# Patient Record
Sex: MALE | Race: WHITE | Employment: OTHER | URBAN - METROPOLITAN AREA
[De-identification: names, ages, dates, MRNs, and addresses within clinical notes are randomized per-mention and may not be internally consistent; named-entity substitution may affect disease eponyms.]

---

## 2017-01-23 ENCOUNTER — TELEPHONE (OUTPATIENT)
Dept: SURGERY | Facility: CLINIC | Age: 67
End: 2017-01-23

## 2017-01-23 NOTE — TELEPHONE ENCOUNTER
After appointment was scheduled family asked for PT order to be updated. Will have provider review to see if this is appropriate. Patient has appointment scheduled with ANTON Napoles on 1/27/17. Patient was last seen in the office on 11/22/16.

## 2017-01-23 NOTE — TELEPHONE ENCOUNTER
Spoke with patient's wife Lalitha Regalado who stated patient had cervical surgery back in 6/2016. Wife states patient is having difficulty walking. States he walks about 2 blocks and is totally exhausted.  Patient also has a hx of prostate CA and is having his PSA l

## 2017-01-27 ENCOUNTER — TELEPHONE (OUTPATIENT)
Dept: SURGERY | Facility: CLINIC | Age: 67
End: 2017-01-27

## 2017-01-27 NOTE — TELEPHONE ENCOUNTER
No physical therapy until patient is reevaluated. He was scheduled to be seen today January 27 but was unable to make it.

## 2017-01-30 NOTE — TELEPHONE ENCOUNTER
LMTCB. Need to relay message below. (Patient does not have any follow up appointments scheduled at this time.  Patient was a no show for appointment on 1/27/17.)

## 2017-01-31 NOTE — TELEPHONE ENCOUNTER
Attempted to reach patient on both cell and home numbers listed. LMTCB. See Shelly Hernandez's message below.

## 2017-02-06 NOTE — TELEPHONE ENCOUNTER
Attempted to contact patient several times without success  [employer] message sent to patient.

## 2017-02-09 ENCOUNTER — TELEPHONE (OUTPATIENT)
Dept: SURGERY | Facility: CLINIC | Age: 67
End: 2017-02-09

## 2017-02-09 ENCOUNTER — OFFICE VISIT (OUTPATIENT)
Dept: SURGERY | Facility: CLINIC | Age: 67
End: 2017-02-09

## 2017-02-09 VITALS
HEART RATE: 92 BPM | WEIGHT: 240 LBS | HEIGHT: 72 IN | RESPIRATION RATE: 18 BRPM | DIASTOLIC BLOOD PRESSURE: 84 MMHG | BODY MASS INDEX: 32.51 KG/M2 | SYSTOLIC BLOOD PRESSURE: 150 MMHG

## 2017-02-09 DIAGNOSIS — R90.89 ABNORMAL FINDING ON MRI OF BRAIN: Primary | ICD-10-CM

## 2017-02-09 DIAGNOSIS — G91.9 HYDROCEPHALUS (HCC): ICD-10-CM

## 2017-02-09 PROCEDURE — 99213 OFFICE O/P EST LOW 20 MIN: CPT | Performed by: NEUROLOGICAL SURGERY

## 2017-02-09 NOTE — PROGRESS NOTES
Here for routine follow up, neck pain from previous fall has resolved. Balance has improved but per wife he is still having walking issues.

## 2017-02-09 NOTE — PATIENT INSTRUCTIONS
Refill policies:    • Allow 2 business days for refills; controlled substances may take longer.   • Contact your pharmacy at least 5 days prior to running out of medication and have them send an electronic request or submit request through the “request re your physician has recommended that you have a procedure or additional testing performed. [facility]) will contact your insurance carrier to obtain pre-certification or prior authorization.     Unfortunately, CRISTA has seen an increas

## 2017-02-09 NOTE — TELEPHONE ENCOUNTER
Spoke with Claudia Alejandre who stated when they decide on the lumbar drain, PT would be after the lumbar drain. Patient's wife had a few other questions that were answered to wife's satisfaction. Wife was thankful.

## 2017-02-10 ENCOUNTER — TELEPHONE (OUTPATIENT)
Dept: SURGERY | Facility: CLINIC | Age: 67
End: 2017-02-10

## 2017-02-14 ENCOUNTER — OFFICE VISIT (OUTPATIENT)
Dept: NEUROLOGY | Facility: CLINIC | Age: 67
End: 2017-02-14

## 2017-02-14 VITALS
RESPIRATION RATE: 16 BRPM | SYSTOLIC BLOOD PRESSURE: 140 MMHG | HEART RATE: 90 BPM | BODY MASS INDEX: 32.91 KG/M2 | DIASTOLIC BLOOD PRESSURE: 88 MMHG | WEIGHT: 243 LBS | HEIGHT: 72 IN

## 2017-02-14 DIAGNOSIS — I63.9 BASAL GANGLIA INFARCTION (HCC): ICD-10-CM

## 2017-02-14 DIAGNOSIS — R26.9 GAIT ABNORMALITY: ICD-10-CM

## 2017-02-14 DIAGNOSIS — G91.9 HYDROCEPHALUS (HCC): ICD-10-CM

## 2017-02-14 DIAGNOSIS — R53.1 LEFT-SIDED WEAKNESS: Primary | ICD-10-CM

## 2017-02-14 PROCEDURE — 99204 OFFICE O/P NEW MOD 45 MIN: CPT | Performed by: OTHER

## 2017-02-14 NOTE — PATIENT INSTRUCTIONS
Refill policies:    • Allow 2 business days for refills; controlled substances may take longer.   • Contact your pharmacy at least 5 days prior to running out of medication and have them send an electronic request or submit request through the “request re your physician has recommended that you have a procedure or additional testing performed. [facility]) will contact your insurance carrier to obtain pre-certification or prior authorization.     Unfortunately, CRISTA has seen an increas

## 2017-02-16 ENCOUNTER — TELEPHONE (OUTPATIENT)
Dept: NEUROLOGY | Facility: CLINIC | Age: 67
End: 2017-02-16

## 2017-02-16 DIAGNOSIS — G91.9 HYDROCEPHALUS (HCC): ICD-10-CM

## 2017-02-16 DIAGNOSIS — I63.9 BASAL GANGLIA INFARCTION (HCC): ICD-10-CM

## 2017-02-16 DIAGNOSIS — Z91.041 CONTRAST MEDIA ALLERGY: Primary | ICD-10-CM

## 2017-02-16 DIAGNOSIS — R26.9 GAIT ABNORMALITY: ICD-10-CM

## 2017-02-16 DIAGNOSIS — R53.1 LEFT-SIDED WEAKNESS: ICD-10-CM

## 2017-02-16 NOTE — TELEPHONE ENCOUNTER
Spoke with Celina @ [facility]. [facility] MRA Brain and Neck has contrast and patient is allergic to contrast dye. Patient will need Premedication ordered. Premedication pending Dr Farrukh Kaplan approval.     Left message for patient to call back.  Noah Gutierres

## 2017-02-16 NOTE — PROGRESS NOTES
HPI:    Patient ID: Neno Benz is a 79year old male. Referring provider: Dr Yohan Hernandez  PCP: Dr Madhuri Villavicencio    HPI    Mr Dayo Gambino is a 79year old male who presented for evaluation of possible stroke and gait abnormality.  He has history of cervical spinal stenosis s HISTORY:  Past Medical History   Diagnosis Date   • Obstructive sleep apnea (adult) (pediatric) 6/29/2012   • COURT on CPAP 10/19/2012     6-20 [employer]   • Lung mass 10/19/2012   • Brain mass 10/19/2012   • Tobacco dependence 10/19/2012   • Obesity 10/19/2 beer occasionally       Review of Systems   Constitutional: Positive for fatigue. HENT: Negative. Eyes: Negative. Respiratory: Negative. Cardiovascular: Negative. Gastrointestinal: Negative. Endocrine: Negative. Genitourinary: Negative. intact comprehension, repetition and naming. Normal attention span. Delayed recall 3/5  Cranial nerves:   II, III, IV, VI :Pupils round, equal and reactive to light  and accommodation bilaterally. Extraocular muscle intact. Visual fields intact.    V: ganglia. ASSESSMENT/PLAN:   Left-sided weakness  (primary encounter diagnosis)  Basal ganglia infarction (hcc)  Gait abnormality  Hydrocephalus      1. Left face and arm weakness, new onset.  Obtain MRI brain and MRA head and neck to rule out new

## 2017-02-16 NOTE — IMAGING NOTE
I initially spoke with pt about contrast dye allergies. Pt w/know allergies to both CT and MRI contrast. States he had hives last time it was used. He stated he wanted to reschedule 2/17 apt until he gets pretx.   I reached out to [employer] office and Ma

## 2017-02-17 ENCOUNTER — PRIOR ORIGINAL RECORDS (OUTPATIENT)
Dept: OTHER | Age: 67
End: 2017-02-17

## 2017-02-17 ENCOUNTER — HOSPITAL ENCOUNTER (OUTPATIENT)
Dept: MRI IMAGING | Facility: HOSPITAL | Age: 67
Discharge: HOME | End: 2017-02-17
Attending: Other
Payer: MEDICARE

## 2017-02-20 RX ORDER — PREDNISONE 10 MG/1
TABLET ORAL
Qty: 15 TABLET | Refills: 0 | Status: SHIPPED | OUTPATIENT
Start: 2017-02-20 | End: 2017-02-23 | Stop reason: ALTCHOICE

## 2017-02-20 NOTE — TELEPHONE ENCOUNTER
Spoke with pts wife and reviewed dosing schedule, and confirmed that Rxs will be electronically sent when Dr. Corey Plaza signs them. Also instructed her to contact PCP for Vit D Rx. She verbalized understanding and had no further questions.

## 2017-02-21 ENCOUNTER — HOSPITAL ENCOUNTER (OUTPATIENT)
Dept: MRI IMAGING | Age: 67
Discharge: HOME | End: 2017-02-21
Attending: Other
Payer: MEDICARE

## 2017-02-21 DIAGNOSIS — G91.9 HYDROCEPHALUS (HCC): ICD-10-CM

## 2017-02-21 DIAGNOSIS — R26.9 GAIT ABNORMALITY: ICD-10-CM

## 2017-02-21 DIAGNOSIS — I63.9 BASAL GANGLIA INFARCTION (HCC): ICD-10-CM

## 2017-02-21 DIAGNOSIS — R53.1 LEFT-SIDED WEAKNESS: ICD-10-CM

## 2017-02-21 PROBLEM — Z91.041 CONTRAST MEDIA ALLERGY: Status: ACTIVE | Noted: 2017-02-21

## 2017-02-21 PROCEDURE — 70553 MRI BRAIN STEM W/O & W/DYE: CPT

## 2017-02-21 PROCEDURE — 70549 MR ANGIOGRAPH NECK W/O&W/DYE: CPT

## 2017-02-21 PROCEDURE — 70546 MR ANGIOGRAPH HEAD W/O&W/DYE: CPT

## 2017-02-21 PROCEDURE — A9575 INJ GADOTERATE MEGLUMI 0.1ML: HCPCS | Performed by: OTHER

## 2017-02-21 NOTE — TELEPHONE ENCOUNTER
Spoke with [facility]. Alan Mace states MRI/MRA orders are without contrast and patient has had premed prep. She requests to change MRI/MRA orders to with and without contrast.     Informed Alan Mace below conversation with Gayle Hand.      Noted patient reed

## 2017-02-22 ENCOUNTER — TELEPHONE (OUTPATIENT)
Dept: NEUROLOGY | Facility: CLINIC | Age: 67
End: 2017-02-22

## 2017-02-22 NOTE — TELEPHONE ENCOUNTER
Spoke to the patient and his wife regarding MRI brain and MRA head and neck results. States he saw Dr Juliocesar Rodriguez from Cardiology and he recommended US arterial of the legs given decrease leg pulses. He is also going to have US carotids.      MRA neck sh

## 2017-02-22 NOTE — TELEPHONE ENCOUNTER
Patient called front office 308 wanting to put a rush on getting MRI results. Informed front office staff that I was not too familiar with 's schedule, but that I would relay the message to 's nurses.

## 2017-02-22 NOTE — TELEPHONE ENCOUNTER
Patient calling to check on MRI/MRA results. MRI/MRA results in EPIC. Will have provider review results and advise.

## 2017-02-23 ENCOUNTER — HOSPITAL ENCOUNTER (OUTPATIENT)
Dept: CARDIOLOGY CLINIC | Facility: HOSPITAL | Age: 67
Discharge: HOME | End: 2017-02-23
Attending: INTERNAL MEDICINE

## 2017-02-23 ENCOUNTER — OFFICE VISIT (OUTPATIENT)
Dept: SURGERY | Facility: CLINIC | Age: 67
End: 2017-02-23

## 2017-02-23 VITALS
BODY MASS INDEX: 32.86 KG/M2 | RESPIRATION RATE: 18 BRPM | WEIGHT: 240 LBS | SYSTOLIC BLOOD PRESSURE: 122 MMHG | HEART RATE: 90 BPM | DIASTOLIC BLOOD PRESSURE: 80 MMHG | HEIGHT: 71.5 IN

## 2017-02-23 DIAGNOSIS — R26.9 GAIT DISTURBANCE: Primary | ICD-10-CM

## 2017-02-23 DIAGNOSIS — I65.23 BILATERAL CAROTID ARTERY STENOSIS: ICD-10-CM

## 2017-02-23 DIAGNOSIS — R42 DIZZINESS: ICD-10-CM

## 2017-02-23 PROCEDURE — 99213 OFFICE O/P EST LOW 20 MIN: CPT | Performed by: NEUROLOGICAL SURGERY

## 2017-02-23 NOTE — PATIENT INSTRUCTIONS
Refill policies:    • Allow 2 business days for refills; controlled substances may take longer.   • Contact your pharmacy at least 5 days prior to running out of medication and have them send an electronic request or submit request through the “request re your physician has recommended that you have a procedure or additional testing performed. [facility]) will contact your insurance carrier to obtain pre-certification or prior authorization.     Unfortunately, CRISTA has seen an increas

## 2017-02-23 NOTE — PROGRESS NOTES
[facility]    Keniasean Kali  2/23/2017    Diagnosis: Gait disturbance and early fatigue on walking. Follow-up on left caudate lacunar infarction. Stable intraventricular lesion.     Interval History: He is here

## 2017-02-28 ENCOUNTER — MYAURORA ACCOUNT LINK (OUTPATIENT)
Dept: OTHER | Age: 67
End: 2017-02-28

## 2017-02-28 ENCOUNTER — HOSPITAL ENCOUNTER (OUTPATIENT)
Dept: CV DIAGNOSTICS | Facility: HOSPITAL | Age: 67
Discharge: HOME | End: 2017-02-28
Attending: INTERNAL MEDICINE

## 2017-02-28 ENCOUNTER — HOSPITAL ENCOUNTER (OUTPATIENT)
Dept: CARDIOLOGY CLINIC | Facility: HOSPITAL | Age: 67
Discharge: HOME | End: 2017-02-28
Attending: INTERNAL MEDICINE

## 2017-02-28 DIAGNOSIS — I70.0 ATHEROSCLEROSIS OF AORTA (HCC): ICD-10-CM

## 2017-02-28 DIAGNOSIS — R42 DIZZINESS: ICD-10-CM

## 2017-02-28 DIAGNOSIS — M79.606 PAIN OF LOWER EXTREMITY, UNSPECIFIED LATERALITY: ICD-10-CM

## 2017-03-08 ENCOUNTER — PRIOR ORIGINAL RECORDS (OUTPATIENT)
Dept: OTHER | Age: 67
End: 2017-03-08

## 2017-03-08 ENCOUNTER — TELEPHONE (OUTPATIENT)
Dept: SURGERY | Facility: CLINIC | Age: 67
End: 2017-03-08

## 2017-03-08 ENCOUNTER — TELEPHONE (OUTPATIENT)
Dept: NEUROLOGY | Facility: CLINIC | Age: 67
End: 2017-03-08

## 2017-03-08 NOTE — TELEPHONE ENCOUNTER
Madhavi Mcmanus, the patient's wife, contacted the office regarding the patients \"left side of the body went limp\" last night. He fell asleep, went up and down the stairs, felt fatigued, sat down, and upon rising from the chair he had left sided weakness.  No slur

## 2017-03-08 NOTE — TELEPHONE ENCOUNTER
Spoke with patient's wife Alex Gonzalez and reviewed plan per last office visit. Alex Jairojuan manuel understood and stated the cardiac workup came back and they were told he has blockages in his legs and he will be seeing a vascular surgeon on Monday 3/13/17.  Alex Gonzalez was trans

## 2017-03-09 ENCOUNTER — HOSPITAL ENCOUNTER (OUTPATIENT)
Dept: CV DIAGNOSTICS | Facility: HOSPITAL | Age: 67
Discharge: HOME | End: 2017-03-09
Attending: INTERNAL MEDICINE
Payer: MEDICARE

## 2017-03-09 DIAGNOSIS — R94.31 ABNORMAL ELECTROCARDIOGRAM: ICD-10-CM

## 2017-03-09 DIAGNOSIS — R42 DIZZINESS AND GIDDINESS: ICD-10-CM

## 2017-03-09 PROCEDURE — 93225 XTRNL ECG REC<48 HRS REC: CPT

## 2017-03-09 PROCEDURE — 93226 XTRNL ECG REC<48 HR SCAN A/R: CPT

## 2017-03-09 PROCEDURE — 93227 XTRNL ECG REC<48 HR R&I: CPT | Performed by: INTERNAL MEDICINE

## 2017-03-13 ENCOUNTER — PRIOR ORIGINAL RECORDS (OUTPATIENT)
Dept: OTHER | Age: 67
End: 2017-03-13

## 2017-03-14 ENCOUNTER — OFFICE VISIT (OUTPATIENT)
Dept: NEUROLOGY | Facility: CLINIC | Age: 67
End: 2017-03-14

## 2017-03-14 ENCOUNTER — TELEPHONE (OUTPATIENT)
Dept: NEUROLOGY | Facility: CLINIC | Age: 67
End: 2017-03-14

## 2017-03-14 VITALS
BODY MASS INDEX: 33 KG/M2 | RESPIRATION RATE: 18 BRPM | DIASTOLIC BLOOD PRESSURE: 80 MMHG | WEIGHT: 242 LBS | SYSTOLIC BLOOD PRESSURE: 120 MMHG | HEART RATE: 88 BPM

## 2017-03-14 DIAGNOSIS — M79.605 PAIN IN BOTH LOWER EXTREMITIES: ICD-10-CM

## 2017-03-14 DIAGNOSIS — M79.604 PAIN IN BOTH LOWER EXTREMITIES: ICD-10-CM

## 2017-03-14 DIAGNOSIS — R26.9 GAIT DISTURBANCE: Primary | ICD-10-CM

## 2017-03-14 DIAGNOSIS — M48.061 LUMBAR SPINAL STENOSIS: ICD-10-CM

## 2017-03-14 PROCEDURE — 99213 OFFICE O/P EST LOW 20 MIN: CPT | Performed by: OTHER

## 2017-03-14 RX ORDER — MULTIVITAMIN
1 TABLET ORAL DAILY
COMMUNITY
End: 2017-05-08 | Stop reason: ALTCHOICE

## 2017-03-14 NOTE — TELEPHONE ENCOUNTER
No records received yet. Called office again. Nael Hitchcock said that note had not yet been dictated. She put a rush on dictation, and will fax report when it is complete. Dr. Luz Garcia informed.

## 2017-03-14 NOTE — TELEPHONE ENCOUNTER
Pt in office currently, and Dr. Federica Christensen is requesting a copy of the patient's last OV notes from his cardilogist, Dr. Arlin Combs. He is part of the [facility], and therefore we cannot see medical records online.     Shanon 190

## 2017-03-14 NOTE — PROGRESS NOTES
HPI:    Patient ID: Carlyle Levin is a 79year old male. Neurologic Problem  The patient's primary symptoms include weakness. Associated symptoms include back pain and fatigue. Pertinent negatives include no dizziness.          Follow up: Patient state leg weakness and gait balance. In November 2016 he had a fall and had repeat CT head and CT cervical spine which was negative for acute changes. Wife thinks his walking is getting worse again.  He is again getting tired more quickly with walking and has gen anterior cervical neck sx      N/A 6/6/2016    Comment Procedure: ANTERIOR CERVICAL FUSION BG & INST 2 LEVEL;  Surgeon: Kavin Aj MD;  Location: [address] [place] [address] OR     N/A 6/6/2016    Comment Procedure: INTRAOPERATIVE NEURO MONITORING;  Surgeon: Power Dixon Rash    Comment:Iodine Dye Injectable   PHYSICAL EXAM:   Physical Exam      Vitals reviewed  General: Minimal facial expression  Head: Normocephalic and atraumatic.    Neck: Limited due to fusion surgery  Cardiovascular: Normal rate, regular rhythm a and I believe gait issues is multifactorial ( residual from cervical myelopathy, also has lumbar spinal stenosis, PVD). MRI brain showed stable ventricles and intraventricular mass.  He has prominent leg claudication component and has significant peripheral

## 2017-03-17 ENCOUNTER — PRIOR ORIGINAL RECORDS (OUTPATIENT)
Dept: OTHER | Age: 67
End: 2017-03-17

## 2017-03-20 NOTE — IMAGING NOTE
Pt allergic to contrast. Dr. Garrison Inez office called and message left to order premeds. Pt is aware of premed protocol.

## 2017-03-21 ENCOUNTER — PRIOR ORIGINAL RECORDS (OUTPATIENT)
Dept: OTHER | Age: 67
End: 2017-03-21

## 2017-03-22 ENCOUNTER — HOSPITAL ENCOUNTER (OUTPATIENT)
Dept: CT IMAGING | Facility: HOSPITAL | Age: 67
Discharge: HOME | End: 2017-03-22
Attending: INTERNAL MEDICINE
Payer: MEDICARE

## 2017-03-22 DIAGNOSIS — I70.0 ATHEROSCLEROSIS OF AORTA (HCC): ICD-10-CM

## 2017-03-22 PROCEDURE — 75635 CT ANGIO ABDOMINAL ARTERIES: CPT

## 2017-03-23 ENCOUNTER — TELEPHONE (OUTPATIENT)
Dept: NEUROLOGY | Facility: CLINIC | Age: 67
End: 2017-03-23

## 2017-03-23 NOTE — TELEPHONE ENCOUNTER
Per 3/14/17 OV note:  Gait disturbance  (primary encounter diagnosis)  Pain in both lower extremities  Lumbar spinal stenosis      1. Episode of transient left face and arm weakness. Repeat MRI brain showed no new infarcts.  He does have a 50% stenosis in t

## 2017-03-24 NOTE — TELEPHONE ENCOUNTER
Wife Carson Comas calling (OK per HIPAA) to see if test results of CT scan are back. She is anxious to determine next steps for her , as he has been falling lately. Informed her that CT has been resulted, but has not been reviewed by Dr. Tc Okeefe.   Expl

## 2017-03-27 ENCOUNTER — PRIOR ORIGINAL RECORDS (OUTPATIENT)
Dept: OTHER | Age: 67
End: 2017-03-27

## 2017-03-27 NOTE — TELEPHONE ENCOUNTER
I could not speak to Dr Meli Wiley from Cardiology ( not sure if he is on vacation but will contact his office again). Cardiology will contact him with CTA results ( it was recommended by them).

## 2017-03-27 NOTE — TELEPHONE ENCOUNTER
Wife Francisco Payne calling stating she just missed a call from our office. Not noted in Epic. No one listed on HIPAA. Francisco Payne, wife calling back. Informed her I was unable to speak with her, placed call on speaker phone so that patient was there as well.  State

## 2017-03-28 ENCOUNTER — TELEPHONE (OUTPATIENT)
Dept: SURGERY | Facility: CLINIC | Age: 67
End: 2017-03-28

## 2017-03-28 RX ORDER — PREDNISONE 10 MG/1
TABLET ORAL
Qty: 15 TABLET | Refills: 0 | Status: SHIPPED | OUTPATIENT
Start: 2017-03-28 | End: 2017-03-30 | Stop reason: ALTCHOICE

## 2017-03-28 NOTE — TELEPHONE ENCOUNTER
Wife calling to inform that he had a reaction to imaging done recently, she is concerned due to him needing to have an MRI for his next appt.     His reaction consisted of swelling and redness to his face and chest.  Wife states he did not have a reaction i

## 2017-03-28 NOTE — TELEPHONE ENCOUNTER
Spoke with patient's wife. I could not got the chance to speak to Dr Karina Hudson as he is on vacation. Patient wife states he continue to have gait difficulty and fell last week again. No clear leg weakness but fatigue/tiredness with walking.  He has PVD an

## 2017-03-30 ENCOUNTER — OFFICE VISIT (OUTPATIENT)
Dept: SURGERY | Facility: CLINIC | Age: 67
End: 2017-03-30

## 2017-03-30 VITALS
DIASTOLIC BLOOD PRESSURE: 62 MMHG | HEIGHT: 72 IN | WEIGHT: 240 LBS | BODY MASS INDEX: 32.51 KG/M2 | SYSTOLIC BLOOD PRESSURE: 110 MMHG | RESPIRATION RATE: 16 BRPM | HEART RATE: 104 BPM

## 2017-03-30 DIAGNOSIS — R90.89 ABNORMAL FINDING ON MRI OF BRAIN: Primary | ICD-10-CM

## 2017-03-30 PROCEDURE — 99212 OFFICE O/P EST SF 10 MIN: CPT | Performed by: NEUROLOGICAL SURGERY

## 2017-03-30 NOTE — PATIENT INSTRUCTIONS
Refill policies:    • Allow 2 business days for refills; controlled substances may take longer.   • Contact your pharmacy at least 5 days prior to running out of medication and have them send an electronic request or submit request through the “request re insurance carrier to obtain pre-certification or prior authorization. Unfortunately, CRISTA has seen an increase in denial of payment even though the procedure/test has been pre-certified.   You are strongly encouraged to contact your insurance carrier to v

## 2017-03-31 ENCOUNTER — PRIOR ORIGINAL RECORDS (OUTPATIENT)
Dept: OTHER | Age: 67
End: 2017-03-31

## 2017-04-03 ENCOUNTER — PRIOR ORIGINAL RECORDS (OUTPATIENT)
Dept: OTHER | Age: 67
End: 2017-04-03

## 2017-04-05 ENCOUNTER — HOSPITAL ENCOUNTER (OUTPATIENT)
Dept: MRI IMAGING | Age: 67
Discharge: HOME | End: 2017-04-05
Attending: NEUROLOGICAL SURGERY
Payer: MEDICARE

## 2017-04-05 ENCOUNTER — TELEPHONE (OUTPATIENT)
Dept: SURGERY | Facility: CLINIC | Age: 67
End: 2017-04-05

## 2017-04-05 ENCOUNTER — PRIOR ORIGINAL RECORDS (OUTPATIENT)
Dept: OTHER | Age: 67
End: 2017-04-05

## 2017-04-05 DIAGNOSIS — R26.9 GAIT DISTURBANCE: ICD-10-CM

## 2017-04-05 PROCEDURE — 72148 MRI LUMBAR SPINE W/O DYE: CPT

## 2017-04-06 ENCOUNTER — HOSPITAL ENCOUNTER (OUTPATIENT)
Dept: CV DIAGNOSTICS | Facility: HOSPITAL | Age: 67
Discharge: HOME | End: 2017-04-06
Attending: INTERNAL MEDICINE
Payer: MEDICARE

## 2017-04-06 DIAGNOSIS — R06.00 DYSPNEA: ICD-10-CM

## 2017-04-06 DIAGNOSIS — R93.1 ABNORMAL SCREENING CT OF HEART: ICD-10-CM

## 2017-04-06 DIAGNOSIS — R06.03 ACUTE RESPIRATORY DISTRESS: ICD-10-CM

## 2017-04-06 DIAGNOSIS — R53.83 FATIGUE: ICD-10-CM

## 2017-04-06 PROCEDURE — 93017 CV STRESS TEST TRACING ONLY: CPT

## 2017-04-06 PROCEDURE — 93018 CV STRESS TEST I&R ONLY: CPT | Performed by: INTERNAL MEDICINE

## 2017-04-06 PROCEDURE — 78452 HT MUSCLE IMAGE SPECT MULT: CPT | Performed by: INTERNAL MEDICINE

## 2017-04-06 PROCEDURE — 78452 HT MUSCLE IMAGE SPECT MULT: CPT

## 2017-04-06 NOTE — TELEPHONE ENCOUNTER
Wife states that Dr. Vanessa Pimentel, vascular cardiologist,  states that gait issues are neurosurgical not vascular issues.   They are going to see a vascular surgeon for an aneurysm that needs treatment  Wife would like Dr. Simin Engle to call the cardiologist to [facility]

## 2017-04-07 ENCOUNTER — TELEPHONE (OUTPATIENT)
Dept: SURGERY | Facility: CLINIC | Age: 67
End: 2017-04-07

## 2017-04-07 ENCOUNTER — PRIOR ORIGINAL RECORDS (OUTPATIENT)
Dept: OTHER | Age: 67
End: 2017-04-07

## 2017-04-07 NOTE — TELEPHONE ENCOUNTER
Wife states her  is not able to walk, they are at Urologist appointment right now  Urologist feels walking is vascular related, Vascular thinks it is neurosurgical.   Wife does not know what she should do with her     She is requesting to spe

## 2017-04-10 PROBLEM — R90.89 ABNORMAL FINDING ON MRI OF BRAIN: Status: ACTIVE | Noted: 2017-04-10

## 2017-04-10 PROBLEM — I73.9 CLAUDICATION OF BOTH LOWER EXTREMITIES (HCC): Status: ACTIVE | Noted: 2017-04-10

## 2017-04-10 PROBLEM — R29.2 HYPOREFLEXIA: Status: ACTIVE | Noted: 2017-04-10

## 2017-04-10 PROBLEM — I73.9 CLAUDICATION OF BOTH LOWER EXTREMITIES: Status: ACTIVE | Noted: 2017-04-10

## 2017-04-10 PROBLEM — R29.898 WEAKNESS OF BOTH LOWER EXTREMITIES: Status: ACTIVE | Noted: 2017-04-10

## 2017-04-11 ENCOUNTER — TELEPHONE (OUTPATIENT)
Dept: NEUROLOGY | Facility: CLINIC | Age: 67
End: 2017-04-11

## 2017-04-11 NOTE — TELEPHONE ENCOUNTER
----- Message from Mail Longoria MD sent at 4/10/2017  1:02 PM CDT -----  Multilevel degenerative changes unchanged from prior exam and no significant spinal stenosis.

## 2017-04-17 ENCOUNTER — PRIOR ORIGINAL RECORDS (OUTPATIENT)
Dept: OTHER | Age: 67
End: 2017-04-17

## 2017-04-17 ENCOUNTER — TELEPHONE (OUTPATIENT)
Dept: SURGERY | Facility: CLINIC | Age: 67
End: 2017-04-17

## 2017-04-17 NOTE — TELEPHONE ENCOUNTER
Wife calling to inform they went in for a second opinion and saw a neurologist.  Neurologist states he believes pt's problem is not vascular, he feels problem is neurological.  He recommends to draining spinal fluid to check NPH.  Met with Davin Crisostomo

## 2017-04-19 NOTE — TELEPHONE ENCOUNTER
Left a voicemail for the patient's wife Blake Hang call us back.     At Dr. Jaqueline Garcia last visit to 2317 he recommended MRI lumbar spine which patient did have and consider a spinal fluid drainage    He was seen in follow-up on 3/30 he was concerned for vascular

## 2017-04-26 ENCOUNTER — TELEPHONE (OUTPATIENT)
Dept: SURGERY | Facility: CLINIC | Age: 67
End: 2017-04-26

## 2017-04-26 NOTE — TELEPHONE ENCOUNTER
Discussed issue with Araceli Hinton, he is not sure what is the plan of treatment   He is recommending that patient come in to see Dr. Corine Fong for full discussion  Please contact patient and make an appt with Dr. Corine Fong, offer 5/11/17 at 9:00a, 11:30a, or 1:00p

## 2017-05-04 ENCOUNTER — TELEPHONE (OUTPATIENT)
Dept: SURGERY | Facility: CLINIC | Age: 67
End: 2017-05-04

## 2017-05-04 ENCOUNTER — OFFICE VISIT (OUTPATIENT)
Dept: SURGERY | Facility: CLINIC | Age: 67
End: 2017-05-04

## 2017-05-04 VITALS
HEART RATE: 96 BPM | HEIGHT: 72 IN | SYSTOLIC BLOOD PRESSURE: 140 MMHG | RESPIRATION RATE: 16 BRPM | BODY MASS INDEX: 32.51 KG/M2 | WEIGHT: 240 LBS | DIASTOLIC BLOOD PRESSURE: 100 MMHG

## 2017-05-04 DIAGNOSIS — M48.061 LUMBAR STENOSIS: ICD-10-CM

## 2017-05-04 DIAGNOSIS — G91.9 HYDROCEPHALUS (HCC): ICD-10-CM

## 2017-05-04 DIAGNOSIS — I73.9 PVD (PERIPHERAL VASCULAR DISEASE) WITH CLAUDICATION (HCC): ICD-10-CM

## 2017-05-04 DIAGNOSIS — Z98.1 S/P SPINAL FUSION: ICD-10-CM

## 2017-05-04 DIAGNOSIS — I63.9 BASAL GANGLIA INFARCTION (HCC): ICD-10-CM

## 2017-05-04 DIAGNOSIS — R26.9 GAIT DISTURBANCE: Primary | ICD-10-CM

## 2017-05-04 PROCEDURE — 99214 OFFICE O/P EST MOD 30 MIN: CPT | Performed by: PHYSICIAN ASSISTANT

## 2017-05-04 NOTE — PROGRESS NOTES
NEUROSURGERY  POSTOP FOLLOW UP    REASON FOR VISIT:    1. NPH  2. C5-6-7 ACDF 6/6/16  3. Gait disturbance  4. Peripheral vascular disease in the legs    HISTORY OF PRESENT ILLNESS:Silvio Campbell is a 79year old male here in follow up.   He has seen a vas after falling at home off a step stool approximately 2-2 and half weeks ago.  She was on a step stool fell backwards and struck the back of his head on a wooden window seal are wall.  He denies loss of consciousness.  He denies headache.  He has had poster Patient is able to rise from a chair without assistance of his arms. No tremors are noted. He does have some pooling of the right hand.   He has normal tone in his left upper extremity mild increased tone in his right arm mild to moderate increased tone i

## 2017-05-08 NOTE — TELEPHONE ENCOUNTER
Patient called back and was informed of the below instructions. Patient stated he has been holding his baby ASA for the past 4 days. Patient understood and was thankful for the call back. Call was transferred to  to schedule post-op appointment.

## 2017-05-08 NOTE — TELEPHONE ENCOUNTER
LMTCB. Need to review the below surgery instructions with patient. Lumbar Drain trial on 5/9/17 with Carlito Kruger. Discussed pre-op instructions with patient    · Contact Pre-Admissions department at [phone].  Bloodwork will be needed for the procedu

## 2017-05-09 ENCOUNTER — ANESTHESIA EVENT (OUTPATIENT)
Dept: SURGERY | Facility: HOSPITAL | Age: 67
DRG: 030 | End: 2017-05-09
Payer: MEDICARE

## 2017-05-09 ENCOUNTER — HOSPITAL ENCOUNTER (INPATIENT)
Facility: HOSPITAL | Age: 67
LOS: 2 days | Discharge: HOME | DRG: 030 | End: 2017-05-11
Attending: NEUROLOGICAL SURGERY | Admitting: NEUROLOGICAL SURGERY
Payer: MEDICARE

## 2017-05-09 ENCOUNTER — SURGERY (OUTPATIENT)
Age: 67
End: 2017-05-09

## 2017-05-09 ENCOUNTER — ANESTHESIA (OUTPATIENT)
Dept: SURGERY | Facility: HOSPITAL | Age: 67
DRG: 030 | End: 2017-05-09
Payer: MEDICARE

## 2017-05-09 DIAGNOSIS — I73.9 PVD (PERIPHERAL VASCULAR DISEASE) WITH CLAUDICATION (HCC): ICD-10-CM

## 2017-05-09 DIAGNOSIS — I63.9 BASAL GANGLIA INFARCTION (HCC): ICD-10-CM

## 2017-05-09 DIAGNOSIS — Z98.1 S/P SPINAL FUSION: ICD-10-CM

## 2017-05-09 DIAGNOSIS — R26.9 GAIT DISTURBANCE: ICD-10-CM

## 2017-05-09 DIAGNOSIS — M48.061 LUMBAR STENOSIS: ICD-10-CM

## 2017-05-09 DIAGNOSIS — G91.9 HYDROCEPHALUS (HCC): ICD-10-CM

## 2017-05-09 PROCEDURE — 009Y30Z DRAINAGE OF LUMBAR SPINAL CORD WITH DRAINAGE DEVICE, PERCUTANEOUS APPROACH: ICD-10-PCS | Performed by: NEUROLOGICAL SURGERY

## 2017-05-09 PROCEDURE — 99222 1ST HOSP IP/OBS MODERATE 55: CPT | Performed by: OTHER

## 2017-05-09 RX ORDER — DOCUSATE SODIUM 100 MG/1
100 CAPSULE, LIQUID FILLED ORAL 2 TIMES DAILY
Status: DISCONTINUED | OUTPATIENT
Start: 2017-05-09 | End: 2017-05-11

## 2017-05-09 RX ORDER — HYDROCODONE BITARTRATE AND ACETAMINOPHEN 5; 325 MG/1; MG/1
2 TABLET ORAL EVERY 4 HOURS PRN
Status: DISCONTINUED | OUTPATIENT
Start: 2017-05-09 | End: 2017-05-11

## 2017-05-09 RX ORDER — HYDROMORPHONE HYDROCHLORIDE 1 MG/ML
1 INJECTION, SOLUTION INTRAMUSCULAR; INTRAVENOUS; SUBCUTANEOUS EVERY 2 HOUR PRN
Status: DISCONTINUED | OUTPATIENT
Start: 2017-05-09 | End: 2017-05-11

## 2017-05-09 RX ORDER — ACETAMINOPHEN 325 MG/1
650 TABLET ORAL EVERY 6 HOURS PRN
Status: DISCONTINUED | OUTPATIENT
Start: 2017-05-09 | End: 2017-05-09

## 2017-05-09 RX ORDER — HYDROCODONE BITARTRATE AND ACETAMINOPHEN 5; 325 MG/1; MG/1
1 TABLET ORAL AS NEEDED
Status: DISCONTINUED | OUTPATIENT
Start: 2017-05-09 | End: 2017-05-09 | Stop reason: HOSPADM

## 2017-05-09 RX ORDER — ONDANSETRON 2 MG/ML
4 INJECTION INTRAMUSCULAR; INTRAVENOUS AS NEEDED
Status: DISCONTINUED | OUTPATIENT
Start: 2017-05-09 | End: 2017-05-09 | Stop reason: HOSPADM

## 2017-05-09 RX ORDER — ALFUZOSIN HYDROCHLORIDE 10 MG/1
10 TABLET, EXTENDED RELEASE ORAL DAILY
Status: DISCONTINUED | OUTPATIENT
Start: 2017-05-09 | End: 2017-05-11

## 2017-05-09 RX ORDER — HYDROCODONE BITARTRATE AND ACETAMINOPHEN 5; 325 MG/1; MG/1
1 TABLET ORAL EVERY 4 HOURS PRN
Status: DISCONTINUED | OUTPATIENT
Start: 2017-05-09 | End: 2017-05-11

## 2017-05-09 RX ORDER — LABETALOL HYDROCHLORIDE 5 MG/ML
5 INJECTION, SOLUTION INTRAVENOUS EVERY 5 MIN PRN
Status: DISCONTINUED | OUTPATIENT
Start: 2017-05-09 | End: 2017-05-09 | Stop reason: HOSPADM

## 2017-05-09 RX ORDER — BUPIVACAINE HYDROCHLORIDE AND EPINEPHRINE 5; 5 MG/ML; UG/ML
INJECTION, SOLUTION EPIDURAL; INTRACAUDAL; PERINEURAL AS NEEDED
Status: DISCONTINUED | OUTPATIENT
Start: 2017-05-09 | End: 2017-05-09 | Stop reason: HOSPADM

## 2017-05-09 RX ORDER — MEPERIDINE HYDROCHLORIDE 25 MG/ML
12.5 INJECTION INTRAMUSCULAR; INTRAVENOUS; SUBCUTANEOUS AS NEEDED
Status: DISCONTINUED | OUTPATIENT
Start: 2017-05-09 | End: 2017-05-09 | Stop reason: HOSPADM

## 2017-05-09 RX ORDER — ACETAMINOPHEN 500 MG
1000 TABLET ORAL ONCE AS NEEDED
Status: DISCONTINUED | OUTPATIENT
Start: 2017-05-09 | End: 2017-05-09 | Stop reason: HOSPADM

## 2017-05-09 RX ORDER — NALOXONE HYDROCHLORIDE 0.4 MG/ML
80 INJECTION, SOLUTION INTRAMUSCULAR; INTRAVENOUS; SUBCUTANEOUS AS NEEDED
Status: DISCONTINUED | OUTPATIENT
Start: 2017-05-09 | End: 2017-05-09 | Stop reason: HOSPADM

## 2017-05-09 RX ORDER — ACETAMINOPHEN 325 MG/1
650 TABLET ORAL EVERY 6 HOURS PRN
Status: DISCONTINUED | OUTPATIENT
Start: 2017-05-09 | End: 2017-05-11

## 2017-05-09 RX ORDER — SODIUM CHLORIDE, SODIUM LACTATE, POTASSIUM CHLORIDE, CALCIUM CHLORIDE 600; 310; 30; 20 MG/100ML; MG/100ML; MG/100ML; MG/100ML
INJECTION, SOLUTION INTRAVENOUS CONTINUOUS
Status: CANCELLED | OUTPATIENT
Start: 2017-05-09

## 2017-05-09 RX ORDER — ATORVASTATIN CALCIUM 10 MG/1
10 TABLET, FILM COATED ORAL NIGHTLY
Status: DISCONTINUED | OUTPATIENT
Start: 2017-05-09 | End: 2017-05-11

## 2017-05-09 RX ORDER — SODIUM CHLORIDE, SODIUM LACTATE, POTASSIUM CHLORIDE, CALCIUM CHLORIDE 600; 310; 30; 20 MG/100ML; MG/100ML; MG/100ML; MG/100ML
INJECTION, SOLUTION INTRAVENOUS CONTINUOUS
Status: DISCONTINUED | OUTPATIENT
Start: 2017-05-09 | End: 2017-05-11

## 2017-05-09 RX ORDER — HYDROMORPHONE HYDROCHLORIDE 1 MG/ML
0.5 INJECTION, SOLUTION INTRAMUSCULAR; INTRAVENOUS; SUBCUTANEOUS EVERY 2 HOUR PRN
Status: DISCONTINUED | OUTPATIENT
Start: 2017-05-09 | End: 2017-05-11

## 2017-05-09 RX ORDER — FAMOTIDINE 20 MG/1
20 TABLET ORAL 2 TIMES DAILY
Status: DISCONTINUED | OUTPATIENT
Start: 2017-05-09 | End: 2017-05-11

## 2017-05-09 RX ORDER — HYDROCODONE BITARTRATE AND ACETAMINOPHEN 5; 325 MG/1; MG/1
2 TABLET ORAL AS NEEDED
Status: DISCONTINUED | OUTPATIENT
Start: 2017-05-09 | End: 2017-05-09 | Stop reason: HOSPADM

## 2017-05-09 RX ORDER — FAMOTIDINE 10 MG/ML
20 INJECTION, SOLUTION INTRAVENOUS 2 TIMES DAILY
Status: DISCONTINUED | OUTPATIENT
Start: 2017-05-09 | End: 2017-05-11

## 2017-05-09 RX ORDER — HYDROMORPHONE HYDROCHLORIDE 1 MG/ML
0.4 INJECTION, SOLUTION INTRAMUSCULAR; INTRAVENOUS; SUBCUTANEOUS EVERY 5 MIN PRN
Status: DISCONTINUED | OUTPATIENT
Start: 2017-05-09 | End: 2017-05-09 | Stop reason: HOSPADM

## 2017-05-09 RX ORDER — ONDANSETRON 2 MG/ML
4 INJECTION INTRAMUSCULAR; INTRAVENOUS EVERY 6 HOURS PRN
Status: DISCONTINUED | OUTPATIENT
Start: 2017-05-09 | End: 2017-05-11

## 2017-05-09 RX ORDER — DIPHENHYDRAMINE HYDROCHLORIDE 50 MG/ML
12.5 INJECTION INTRAMUSCULAR; INTRAVENOUS AS NEEDED
Status: DISCONTINUED | OUTPATIENT
Start: 2017-05-09 | End: 2017-05-09 | Stop reason: HOSPADM

## 2017-05-09 NOTE — H&P
[id number] [facility]   History and Physical    Mario Hernandez Patient Status:  Surgery Admit    1950 MRN [id number]   Location [address] [place] [address] Attending Ericka Dobbins MD   Hosp Day # 0 PCP Petra Ballard is old.  He has had cervical x-rays but no other imaging.  He denies drowsiness or somnolence.  Swallowing is improved since his surgery.        PAST MEDICAL HISTORY:  Past Medical History   Diagnosis Date   • Obstructive sleep apnea (adult) (pediatric) 6/2 Rash    Comment:Iodine Dye Injectable    MEDICATIONS:    Prescriptions prior to admission:  Cholecalciferol (VITAMIN D) 1000 units Oral Tab Take by mouth daily. Disp:  Rfl:  5/5/2017   Pravastatin Sodium 40 MG Oral Tab Take 40 mg by mouth nightly.  Disp:  R NPH  2. Gait disturbance  3.  PVD      Plan:  Admit for lumbar drain trial  Further orders to follow post-op        CHANDLER Esteban [address]  Pager [phone]  5/9/2017, 7:53 AM

## 2017-05-09 NOTE — ANESTHESIA PREPROCEDURE EVALUATION
PRE-OP EVALUATION    Patient Name: Kumar Canas    Pre-op Diagnosis: Gait disturbance [R26.9]  Hydrocephalus [G91.9]  Basal ganglia infarction ([facility] [I63.9]  S/P spinal fusion [Z98.1]  PVD (peripheral vascular disease) with claudication ([facility] [I73.9]  Demetrio Edwards cardiac symptoms.    4.  No previous study for comparison. d Roddy Dow MD  t   [id number] 4/8/2017 10:28 AM              (+) obesity     (+) hyperlipidemia                                  Endo/Other    Negative endo/other ROS. 05/09/2017         Airway      Mallampati: II  Mouth opening: >3 FB  TM distance: > 6 cm  Neck ROM: full Cardiovascular    Cardiovascular exam normal.  Rhythm: regular  Rate: normal     Dental      Dental appliance(s): partials       Pulmonary    Pulmonary

## 2017-05-09 NOTE — CONSULTS
[facility]  Neuro Critical Care Consult Note    Julia Ramos Patient Status:  Inpatient    1950 MRN [id number]   [facility] 6NE-A Attending Yamilet Alvarado MD   Hosp Day # 0 PCP Win Cordero MD     Date of Admission: 2017 INTRAOCULAR LENS IMPLANT [id number];  Surgeon:  Gonzalo Bey MD;  Location: [address] [place] [address]    OTHER SURGICAL HISTORY  2/20/13    Comment PNBx - Dr. Saima Osuna  5/2/13    0615 y 628 PRN  •  cholecalciferol (VITAMIN D3) cap/tab 1,000 Units, 1,000 Units, Oral, Daily  •  atorvastatin (LIPITOR) tab 10 mg, 10 mg, Oral, Nightly  •  Alfuzosin HCl ER (UROXATRAL) 24 hr tab 10 mg, 10 mg, Oral, Daily  •  acetaminophen (TYLENOL) tab 650 mg, 650 m PTP 13.4 05/09/2017       Imaging:  MRI brain images reviewed personally. Assessment/Plan:  1. Gait disturbance  2. Ventriculomegaly. 3. PPD.    -CSF drainage rate per neurosurgery.  -Check labs in a.m.  -Restart home meds.   -PT OT.  -Tylenol for the

## 2017-05-09 NOTE — PHYSICAL THERAPY NOTE
PHYSICAL THERAPY EVALUATION - INPATIENT     Room Number: 6846/7426-W  Evaluation Date: 5/9/2017  Type of Evaluation: Initial  Physician Order: PT Eval and Treat    Presenting Problem: LD trial for NPH  Reason for Therapy: Mobility Dysfunction and Disch Equipment: Rolling walker;Cane  Patient Regularly Uses: Glasses    Prior Level of [place]: Pt typically independent with ADLs and mobility. Pt does not use AD. Pt reports more fatigue lately and difficulty ambulating long distances.  Pt works occasiona bedside commode, etc.): A Little   -   Moving from lying on back to sitting on the side of the bed?: None   How much help from another person does the patient currently need. ..   -   Moving to and from a bed to a chair (including a wheelchair)?: A Little patient presents with the following impairments: LE strength deficits and balance impairments. These impairments manifest themselves as functional limitations in ambulation, stair negotiation, and gait stability.  Pt scored 13 seconds on the TUG, which puts

## 2017-05-09 NOTE — OPERATIVE REPORT
[facility]    OPERATIVE REPORT    Patient:  Mario Hernandez;  YOB: 1950     CSN:  [id number]; Medical Record Number:  [id number]    Admission Date:  5/9/2017 Operation Date:  5/9/2017    . ..........................     Operating Physician: sent for appropriate laboratory studies.   Pressure readings were taken: Opening Pressure 55 cm H2O        5 cc drained  38      10   25      15   20      20   16      25   13      A lumbar drainage catheter was then threaded up the needle to get the tip to

## 2017-05-09 NOTE — ANESTHESIA POSTPROCEDURE EVALUATION
[address] Patient Status:  Surgery Admit   Age/Gender 79year old male MRN [id number]   [facility] SURGERY Attending Ericka Dobbins MD   Hosp Day # 0 PCP Lolis Mtz MD       Anesthesia Post-op Note    Procedure(s

## 2017-05-09 NOTE — PLAN OF CARE
Received pt from PACU at 1145. Neurologically intact. Pt with lumbar drain, clamped until PT evaluation. After PT evaluated, lumbar drain opened, to drain 8-10cc/hr. Pt with mild headache, treated with pain medication.  At 1800, pt with worsening headache,

## 2017-05-09 NOTE — OCCUPATIONAL THERAPY NOTE
OCCUPATIONAL THERAPY EVALUATION - INPATIENT     Room Number: 8495/6880-L  Evaluation Date: 5/9/2017  Type of Evaluation: Initial  Presenting Problem: LD for NPH    Physician Order: IP Consult to Occupational Therapy  Reason for Therapy: ADL/IADL Dysfunctio retired, works at [employer]: Right  Drives: Yes  Patient Regularly Uses: Glasses    Prior Level of [place]: Pt was independent PTa living at home with wife and noticing some increased fatigue and decreased endurance    SUBJECTIVE (AM-PAC Scale): 42.03  CMS Modifier (G-Code): CJ    FUNCTIONAL TRANSFER ASSESSMENT  Supine to Sit : Minimum assistance  Sit to Stand: Minimum assistance    Skilled Therapy Provided: Pt was received supine in bed for session, t/f to EOB with min assist, the grooming routine

## 2017-05-10 ENCOUNTER — APPOINTMENT (OUTPATIENT)
Dept: CT IMAGING | Facility: HOSPITAL | Age: 67
DRG: 030 | End: 2017-05-10
Attending: NEUROLOGICAL SURGERY
Payer: MEDICARE

## 2017-05-10 PROCEDURE — 70450 CT HEAD/BRAIN W/O DYE: CPT | Performed by: NEUROLOGICAL SURGERY

## 2017-05-10 PROCEDURE — 99231 SBSQ HOSP IP/OBS SF/LOW 25: CPT | Performed by: OTHER

## 2017-05-10 NOTE — PROGRESS NOTES
[address] Neurosurgery Progress Note    Magno Gist Patient Status:  Inpatient    1950 MRN [id number]   [facility] 6NE-A Attending Andrei Seay MD   Hosp Day # 1 PCP Natalia Cain MD     Subjective:  Magno Gist is high CT density blood along the superior and posterior aspect. Assessment:  1. S/P Lumbar Drain placement for NPH Trial POD #1  1. CT brain this am with blood in left lateral ventricle  2. Possible NPH vs Hydrocephalus with gait instability  1.  Open pre

## 2017-05-10 NOTE — PHYSICAL THERAPY NOTE
PHYSICAL THERAPY TREATMENT NOTE - INPATIENT    Room Number: 1166/5701-B     Session: 1   Number of Visits to Meet Established Goals: 4    Presenting Problem: LD trial for NPH    Problem List  Active Problems:    Gait disturbance      Past Medical History Static Sitting: Good  Dynamic Sitting: Good           Static Standing: Fair  Dynamic Standing: Fair    ACTIVITY TOLERANCE  O2 Saturation: 98%  Room air    AM-PAC '6-Clicks' INPATIENT SHORT FORM - BASIC MOBI chair. Pt educated on POC and recommendations. Pt left with call light in reach. RN aware. *Pt and Pts wife note that patient would have been more fatigued doing the same activity prior to LD placement.      THERAPEUTIC EXERCISES  Lower Extremity      U

## 2017-05-10 NOTE — CONSULTS
.  Reason for consult: med mgmt    Consulted by: Dr. Jakob Bunn    PCP: Win Cordero MD      History of Present Illness: Patient is a 79year old male with PMH sig for COURT on CPAP, tobacco dependence, PVD and gait instability over the past year.  He has had k tablet (81 mg total) by mouth daily. Disp: 30 tablet Rfl: 0   Nicotine Polacrilex 4 MG Mouth/Throat Lozenge Place 4 mg inside cheek daily as needed for Smoking cessation.  Disp:  Rfl:    tamsulosin HCl (FLOMAX) 0.4 MG Oral Cap Take 1 capsule (0.4 mg total) mucosa, and tongue normal. Teeth and gums normal.   Neck: Supple, symmetrical, trachea midline, no cervical or supraclavicular lymph adenopathy, thyroid: no enlargment/tenderness/nodules appreciated   Lungs:   Clear to auscultation bilaterally.  Normal effo PATIENT STATED HISTORY: (As transcribed by Technologist)  CCU patient  5/9  post-op CSF lumbar drain   history of brain mass that has been stable for several years    FINDINGS:   VENTRICLES/SULCI:  There is an air bubble in the anterior aspect of the sylvi these critical value results at 0740 hours on 5/10/17 and read back was performed.     Dictated by: Shona Fry MD on 5/10/2017 at 7:30     Approved by: Shona Fry MD                 ASSESSMENT / PLAN:    # Gait instability, ventriculomegaly  ·

## 2017-05-10 NOTE — OCCUPATIONAL THERAPY NOTE
OCCUPATIONAL THERAPY TREATMENT NOTE - INPATIENT     Room Number: 2501/7114-R  Session: 1   Number of Visits to Meet Established Goals: 5    Presenting Problem: LD for NPH    History related to current admission: Pt is admit for NPH with LD trial      Probl air  No shortness of breath    ACTIVITIES OF DAILY LIVING ASSESSMENT  AM-PAC ‘6-Clicks’ Inpatient Daily Activity Short Form  How much help from another person does the patient currently need…  -   Putting on and taking off regular lower body clothing?: Non reeducation;Equipment eval/education  Rehab Potential : Good  Frequency (Obs): 5x/week      OT Goals:  ALL MET AS OF 5/10

## 2017-05-10 NOTE — CONSULTS
[facility]  Neuro Critical Care Progress Note    Williams Patterson Patient Status:  Inpatient    1950 MRN [id number]   [facility] 6NE-A Attending Breonna Wise MD   Hosp Day # 1 PCP Betty Baptiste MD     Subjective:    Objective: Oral, Q6H PRN  •  HYDROcodone-acetaminophen (NORCO) 5-325 MG per tab 1 tablet, 1 tablet, Oral, Q4H PRN **OR** HYDROcodone-acetaminophen (NORCO) 5-325 MG per tab 2 tablet, 2 tablet, Oral, Q4H PRN  •  HYDROmorphone HCl PF (DILAUDID) 1 MG/ML injection 0.5 mg,

## 2017-05-10 NOTE — PROGRESS NOTES
2000- received pt alert and orientated. Vss. Neuro's intact. Lumbar drain intact and draining. Pt has no c/o headache at this time. No apparent distress noted. 2200- pt up to bathroom. Steady and strong on feet.  Pt back to bed and right away felt nauseous

## 2017-05-11 VITALS
HEIGHT: 72 IN | OXYGEN SATURATION: 95 % | HEART RATE: 81 BPM | RESPIRATION RATE: 12 BRPM | DIASTOLIC BLOOD PRESSURE: 94 MMHG | WEIGHT: 228.19 LBS | SYSTOLIC BLOOD PRESSURE: 164 MMHG | TEMPERATURE: 98 F | BODY MASS INDEX: 30.91 KG/M2

## 2017-05-11 PROCEDURE — 99231 SBSQ HOSP IP/OBS SF/LOW 25: CPT | Performed by: OTHER

## 2017-05-11 NOTE — PROGRESS NOTES
[facility]  Neuro Critical Care Progress Note    Carlyle Poonam Patient Status:  Inpatient    1950 MRN [id number]   [facility] 6NE-A Attending Sha Hudson MD   Hosp Day # 2 PCP Ronak Dash MD     Subjective:  Complaining o acetaminophen (TYLENOL) tab 650 mg, 650 mg, Oral, Q6H PRN  •  HYDROcodone-acetaminophen (NORCO) 5-325 MG per tab 1 tablet, 1 tablet, Oral, Q4H PRN **OR** HYDROcodone-acetaminophen (NORCO) 5-325 MG per tab 2 tablet, 2 tablet, Oral, Q4H PRN    Lab Data Revie

## 2017-05-11 NOTE — PLAN OF CARE
Assumed care of pt approx 1915. Lumbar drain, draining goal of 5-8ml/hr of clear fluid, site asymptomatic and c/d/i. Vitals stable. Medicated with morphine for headache, with relief. Plan of care reviewed and all questions answered and encouraged.   Quique

## 2017-05-11 NOTE — PHYSICAL THERAPY NOTE
PHYSICAL THERAPY TREATMENT NOTE - INPATIENT    Room Number: 0213/5720-H     Session: 2  Number of Visits to Meet Established Goals: 4    Presenting Problem: LD trial for NPH    Problem List  Active Problems:    Gait disturbance      Past Medical History Static Sitting: Good  Dynamic Sitting: Good           Static Standing: Fair  Dynamic Standing: Fair    ACTIVITY TOLERANCE  O2 Saturation: 98%  Room air    AM-PAC '6-Clicks' INPATIENT SHORT FORM - BASIC MOBILITY  H fatigued doing the same activity prior to LD placement.      THERAPEUTIC EXERCISES  Lower Extremity      Upper Extremity      Position      Repetitions      Sets        Patient End of Session: In bed;Needs met;Call light within reach;RN aware of session/fin

## 2017-05-11 NOTE — PROGRESS NOTES
[address] [facility] Neurosurgery Progress Note    Krista Mancilla Patient Status:  Inpatient    1950 MRN [id number]   [facility] 6NE-A Attending Austin Franz MD   Hosp Day # 2 PCP Tyrone Whittaker MD     Subjective:  Krista Mancilla is now has some high CT density blood along the superior and posterior aspect. Assessment:  1. S/P Lumbar Drain placement for NPH Trial POD #2  1. CT brain 5/10 with blood in left lateral ventricle  2.  Possible NPH vs Hydrocephalus with gait instability  1

## 2017-05-11 NOTE — PROGRESS NOTES
[facility] Hospitalist note    PCP: Vincent Schulz MD    Chief Complaint:  F/u lumbar drain    SUBJECTIVE:  Pt still feels a bit nauseous, no vomiting. Feels gait is better. No new complaints.     OBJECTIVE:  Temp:  [97 °F (36.1 °C)-98.6 °F (37 °C)] 98.4 °F (3 Units Oral Daily   • atorvastatin  10 mg Oral Nightly   • Alfuzosin HCl ER  10 mg Oral Daily     • lactated ringers 20 mL/hr at 05/09/17 0646     ondansetron HCl, acetaminophen, HYDROcodone-acetaminophen **OR** HYDROcodone-acetaminophen       Assessment/Pl

## 2017-05-11 NOTE — PLAN OF CARE
Assumed care of pt this am. Pt alert and oriented with some nausea and headache. Pt ambulated with PT/OT and drain clamped. Drain removed at [facility]. Pt able to ambulate in halls at 1345, states slight HA but \"Feels much better\". Pt able to discharge home.

## 2017-05-11 NOTE — RESPIRATORY THERAPY NOTE
COURT - Equipment Use Daily Summary:  · Set Mode   · Usage in hours:   · 90% Pressure (EPAP) level:   · 90% Insp Pressure (IPAP):   · AHI:   · Supplemental Oxygen:  · Comments: NO DATA REPLACED CARD

## 2017-05-11 NOTE — PROGRESS NOTES
[facility] Hospitalist note    PCP: Adelita Venegas MD    Chief Complaint:  F/u lumbar drain    SUBJECTIVE:  Thinks that walking is better  Has had HA needing norco or tylenol  Some nausea, not passing much flatus. OBJECTIVE:  Temp:  [97.9 °F (36.6 °C)-99. 5 lactated ringers 20 mL/hr at 05/09/17 0646     ondansetron HCl, acetaminophen, HYDROcodone-acetaminophen **OR** HYDROcodone-acetaminophen, HYDROmorphone HCl PF **OR** HYDROmorphone HCl PF       Assessment/Plan:     # Gait instability, ventriculomegaly  · C

## 2017-05-14 ENCOUNTER — TELEPHONE (OUTPATIENT)
Dept: MEDSURG UNIT | Facility: HOSPITAL | Age: 67
End: 2017-05-14

## 2017-05-14 RX ORDER — BUTALBITAL, ACETAMINOPHEN AND CAFFEINE 50; 325; 40 MG/1; MG/1; MG/1
1-2 TABLET ORAL EVERY 6 HOURS PRN
Qty: 30 TABLET | Refills: 0 | Status: SHIPPED | OUTPATIENT
Start: 2017-05-14 | End: 2017-05-22

## 2017-05-14 NOTE — PROGRESS NOTES
Got an telephone from patient's wife (wanted to talk to NS Dr Daya Olsen but call got transfer to me). States he has persistent headache since lumbar puncture/ drain. No other symptoms.  Advised drinking enough water, bedrest as much as possible, caffeine intake

## 2017-05-15 ENCOUNTER — TELEPHONE (OUTPATIENT)
Dept: SURGERY | Facility: CLINIC | Age: 67
End: 2017-05-15

## 2017-05-15 NOTE — TELEPHONE ENCOUNTER
Wife states that patient had LP drain trial last week 5/9/17. Now having post LP headache  Reports increased frontal headache with position changes, especially sitting to standing.   Instructed him to go flat bedrest for 24-48 hours, push fluids-especially

## 2017-05-22 ENCOUNTER — OFFICE VISIT (OUTPATIENT)
Dept: SURGERY | Facility: CLINIC | Age: 67
End: 2017-05-22

## 2017-05-22 VITALS — RESPIRATION RATE: 16 BRPM | DIASTOLIC BLOOD PRESSURE: 80 MMHG | SYSTOLIC BLOOD PRESSURE: 120 MMHG | HEART RATE: 80 BPM

## 2017-05-22 DIAGNOSIS — G91.9 HYDROCEPHALUS (HCC): ICD-10-CM

## 2017-05-22 DIAGNOSIS — R26.9 GAIT DISTURBANCE: Primary | ICD-10-CM

## 2017-05-22 DIAGNOSIS — I63.9 BASAL GANGLIA INFARCTION (HCC): ICD-10-CM

## 2017-05-22 PROCEDURE — 99213 OFFICE O/P EST LOW 20 MIN: CPT | Performed by: PHYSICIAN ASSISTANT

## 2017-05-22 RX ORDER — BUTALBITAL, ACETAMINOPHEN AND CAFFEINE 50; 325; 40 MG/1; MG/1; MG/1
1 TABLET ORAL EVERY 6 HOURS PRN
Qty: 90 TABLET | Refills: 0 | Status: SHIPPED | OUTPATIENT
Start: 2017-05-22 | End: 2018-06-08 | Stop reason: ALTCHOICE

## 2017-05-22 NOTE — PROGRESS NOTES
Pt states he feels he had regressed to where he was before the spinal tap  Pain 3-4/10 currently headache  Pt states he is having balance issues

## 2017-05-22 NOTE — DISCHARGE SUMMARY
[facility]  Discharge Summary    Mono Huff Patient Status:  Inpatient    1950 MRN [id number]   [facility] 6NE-A Attending No att. providers found   Hosp Day # 2 PCP Riky Schumacher MD     Date of Admission: 2017    Date of tremors.  He denies any freezing or stiffness in the legs.     Last history:  after falling at home off a step stool approximately 2-2 and half weeks ago.  She was on a step stool fell backwards and struck the back of his head on a wooden window seal are wa Historical    tamsulosin HCl (FLOMAX) 0.4 MG Oral Cap  Take 1 capsule (0.4 mg total) by mouth daily. , Script not printed, Disp-30 capsule, R-0          Follow up Visits: Future Appointments  Date Time Provider Graciela Crocker   5/22/2017 1:00 PM Alison

## 2017-05-22 NOTE — PROGRESS NOTES
NEUROSURGERY  POSTOP FOLLOW UP    REASON FOR VISIT:    1. NPH  2. C5-6-7 ACDF 6/6/16  3. Gait disturbance  4. Peripheral vascular disease in the legs  5.  S/P lumbar drain trial 5/9/17    HISTORY OF PRESENT ILLNESS:Silvio Lopez is a 79year old male her after falling at home off a step stool approximately 2-2 and half weeks ago.  She was on a step stool fell backwards and struck the back of his head on a wooden window seal are wall.  He denies loss of consciousness.  He denies headache.  He has had poster GENERAL:  Patient is in no acute distress. HEENT:  Normocephalic, atraumatic  Patient is able to rise from a chair without assistance of his arms. No tremors are noted. He does have some pooling of the right hand.   He has normal tone in his left upper e

## 2017-05-23 ENCOUNTER — TELEPHONE (OUTPATIENT)
Dept: SURGERY | Facility: CLINIC | Age: 67
End: 2017-05-23

## 2017-05-23 NOTE — TELEPHONE ENCOUNTER
Pt's wife calling to state they were in to see PA yesterday, at which time they were told pt may be having some symptoms associated with Parkinson's, wife Rajan Escoto states she does not know if she agrees with this and is asking for a call back from Dr. Carlos Arrieta

## 2017-05-25 NOTE — TELEPHONE ENCOUNTER
Spoke with ANTON Montanez who stated he discussed patient's case with  this morning who was in agreement with the plan.     Araceli Montanez also stated instead of waiting until august he can speak with  about doing a Sinemet tria

## 2017-05-25 NOTE — TELEPHONE ENCOUNTER
Spoke with patient's wife Madhavi Mcmanus and reviewed below message. Madhavi Mcmanus also stated patient is still having headaches and walking is back to where it was at the beginning. Wife stated the appointment for the 3rd neurologist isn't until 8/2017.      Wants Dr.Car

## 2017-05-26 ENCOUNTER — MYAURORA ACCOUNT LINK (OUTPATIENT)
Dept: OTHER | Age: 67
End: 2017-05-26

## 2017-05-26 ENCOUNTER — PRIOR ORIGINAL RECORDS (OUTPATIENT)
Dept: OTHER | Age: 67
End: 2017-05-26

## 2017-05-26 NOTE — TELEPHONE ENCOUNTER
Spoke with patient's wife Sharda Aragon who was informed of the message below. Sharda Aragon agreed to the sinemet trial. Will relay message to Mary Bell, [address] who stated he will get in contact with .

## 2017-06-01 PROBLEM — G91.9 HYDROCEPHALUS (HCC): Status: ACTIVE | Noted: 2017-06-01

## 2017-06-01 PROBLEM — R83.8 ELEVATED CSF PROTEIN: Status: ACTIVE | Noted: 2017-06-01

## 2017-06-01 PROBLEM — R26.9 GAIT DISORDER: Status: ACTIVE | Noted: 2017-06-01

## 2017-06-16 ENCOUNTER — TELEPHONE (OUTPATIENT)
Dept: SURGERY | Facility: CLINIC | Age: 67
End: 2017-06-16

## 2017-06-16 NOTE — TELEPHONE ENCOUNTER
LMTCB. Discussed with ANTON Gongora, patient should try medications for PD and see if there is improvement  At this time, will hold off on any  shunt surgery until after trial of meds.  There is no emergent need for  shunt

## 2017-06-16 NOTE — TELEPHONE ENCOUNTER
Neurologist, Dr. Barrera Tila [facility]) has confirmed diagnosis of PD per last MRI of brain  Wife is asking what should be done now, does he still need to have shunt.    I informed wife that I would discuss with Dr. Dave Madden and get back to her

## 2017-06-19 ENCOUNTER — PRIOR ORIGINAL RECORDS (OUTPATIENT)
Dept: OTHER | Age: 67
End: 2017-06-19

## 2017-06-19 ENCOUNTER — MYAURORA ACCOUNT LINK (OUTPATIENT)
Dept: OTHER | Age: 67
End: 2017-06-19

## 2017-06-19 NOTE — TELEPHONE ENCOUNTER
Called to speak with patient's wife Lalitha Regalado she was absent. Spoke with Mr. where he has an appointment with neurologist today to discuss his Parkinson's.     I asked for him to have his wife call back after their appointment to discuss her treatment

## 2017-09-27 PROBLEM — R26.9 GAIT DISORDER: Status: RESOLVED | Noted: 2017-06-01 | Resolved: 2017-09-27

## 2017-09-27 PROBLEM — R29.898 WEAKNESS OF BOTH LOWER EXTREMITIES: Status: RESOLVED | Noted: 2017-04-10 | Resolved: 2017-09-27

## 2017-09-27 PROBLEM — R26.9 GAIT DISTURBANCE: Status: RESOLVED | Noted: 2017-05-09 | Resolved: 2017-09-27

## 2017-09-27 PROBLEM — I73.9 CLAUDICATION OF BOTH LOWER EXTREMITIES (HCC): Status: RESOLVED | Noted: 2017-04-10 | Resolved: 2017-09-27

## 2017-09-27 PROBLEM — I73.9 CLAUDICATION OF BOTH LOWER EXTREMITIES: Status: RESOLVED | Noted: 2017-04-10 | Resolved: 2017-09-27

## 2017-09-27 PROBLEM — R29.2 HYPOREFLEXIA: Status: RESOLVED | Noted: 2017-04-10 | Resolved: 2017-09-27

## 2018-01-26 ENCOUNTER — TELEPHONE (OUTPATIENT)
Dept: SURGERY | Facility: CLINIC | Age: 68
End: 2018-01-26

## 2018-01-26 NOTE — TELEPHONE ENCOUNTER
Patient was last seen 5/22/17 at that time patient was going to get a second opinion from neurology. Patient will need to come back to the office to have walking re-evaluated.

## 2018-01-26 NOTE — TELEPHONE ENCOUNTER
Contacted pt to schedule f/u appt Pt informed me he is currently out of town and will call to schedule once he return back.  Not sure when that will be

## 2018-02-19 NOTE — PROGRESS NOTES
Neurological Surgery Outpatient Clinic    Jeremiah Gtz  2/9/2017    Diagnosis: Postop anterior cervical fusion June 6, 2016. Gait disturbance. Right lateral ventricular brain lesion. Ongoing gait difficulty.     Interval History: He had a fall several m

## 2018-02-20 NOTE — H&P
ED [facility], [address] [place] [address]    History and Physical    Angela Patel Patient Status:  No patient class for patient encounter    1950 MRN [id number]   Location ED [facility], [address] [place] [address] Attending No att.  pr cancer   • Cancer Sister    • Cancer Sister    • Cancer Sister      Social History:  Smoking status: Former Smoker                                                              Packs/day: 0.50      Years: 30.00        Types: Cigarettes     Quit date: 5/8/20

## 2018-02-20 NOTE — PROGRESS NOTES
Neurological Surgery Outpatient Clinic    Roxanne Ba  3/30/2017    Diagnosis: Walking difficulty. Vascular claudication. Possible normal pressure hydrocephalus. Stable benign intraventricular brain lesion.     Interval History: He continues to have [employer]

## 2018-04-17 ENCOUNTER — TELEPHONE (OUTPATIENT)
Dept: SURGERY | Facility: CLINIC | Age: 68
End: 2018-04-17

## 2019-02-28 VITALS
BODY MASS INDEX: 32.78 KG/M2 | HEIGHT: 72 IN | SYSTOLIC BLOOD PRESSURE: 102 MMHG | HEART RATE: 68 BPM | WEIGHT: 242 LBS | DIASTOLIC BLOOD PRESSURE: 65 MMHG

## 2019-03-01 VITALS
WEIGHT: 241 LBS | HEIGHT: 72 IN | HEART RATE: 93 BPM | BODY MASS INDEX: 32.64 KG/M2 | SYSTOLIC BLOOD PRESSURE: 102 MMHG | DIASTOLIC BLOOD PRESSURE: 68 MMHG

## 2019-03-01 VITALS
SYSTOLIC BLOOD PRESSURE: 118 MMHG | BODY MASS INDEX: 32.78 KG/M2 | DIASTOLIC BLOOD PRESSURE: 72 MMHG | HEART RATE: 78 BPM | HEIGHT: 72 IN | WEIGHT: 242 LBS

## 2019-03-01 VITALS — SYSTOLIC BLOOD PRESSURE: 118 MMHG | HEART RATE: 92 BPM | DIASTOLIC BLOOD PRESSURE: 76 MMHG | WEIGHT: 240 LBS

## 2019-03-01 VITALS
HEIGHT: 72 IN | HEART RATE: 100 BPM | SYSTOLIC BLOOD PRESSURE: 130 MMHG | DIASTOLIC BLOOD PRESSURE: 86 MMHG | WEIGHT: 246 LBS | BODY MASS INDEX: 33.32 KG/M2

## 2019-03-01 VITALS
HEART RATE: 84 BPM | WEIGHT: 240 LBS | HEIGHT: 72 IN | BODY MASS INDEX: 32.51 KG/M2 | DIASTOLIC BLOOD PRESSURE: 80 MMHG | SYSTOLIC BLOOD PRESSURE: 128 MMHG

## 2019-08-29 NOTE — PATIENT INSTRUCTIONS
Refill policies:    • Allow 2 business days for refills; controlled substances may take longer.   • Contact your pharmacy at least 5 days prior to running out of medication and have them send an electronic request or submit request through the “request re your physician has recommended that you have a procedure or additional testing performed. [facility] will contact your insurance carrier to obtain pre-certification or prior authorization.     Unfortunately, CRISTA has seen an increas None

## 2021-05-11 PROBLEM — I72.3 ILIAC ANEURYSM (HCC): Status: ACTIVE | Noted: 2019-04-12

## 2021-05-11 PROBLEM — C61 ADENOCARCINOMA OF PROSTATE (HCC): Status: ACTIVE | Noted: 2018-08-17

## 2021-05-11 PROBLEM — C25.4 MALIGNANT NEOPLASM OF ENDOCRINE PANCREAS (HCC): Status: ACTIVE | Noted: 2018-08-16

## 2021-05-11 PROBLEM — I71.40 ABDOMINAL AORTIC ANEURYSM (AAA) WITHOUT RUPTURE (HCC): Status: ACTIVE | Noted: 2019-02-01

## 2021-05-11 PROBLEM — G62.9 NEUROPATHY: Status: ACTIVE | Noted: 2017-07-13

## 2021-05-11 PROBLEM — I61.5 INTRAVENTRICULAR HEMORRHAGE (HCC): Status: ACTIVE | Noted: 2017-07-13

## 2021-05-11 PROBLEM — R80.9 MICROALBUMINURIA: Status: ACTIVE | Noted: 2019-02-07

## 2021-05-11 PROBLEM — I25.10 CORONARY ARTERY CALCIFICATION SEEN ON CT SCAN: Status: ACTIVE | Noted: 2020-11-11

## 2021-05-11 PROBLEM — G21.4 VASCULAR PARKINSONISM (HCC): Status: ACTIVE | Noted: 2020-11-11

## 2021-05-11 PROBLEM — J34.3 HYPERTROPHY, NASAL, TURBINATE: Status: ACTIVE | Noted: 2019-04-23

## 2021-05-11 PROBLEM — Z87.891 FORMER SMOKER: Status: ACTIVE | Noted: 2020-11-11

## 2021-05-11 PROBLEM — H90.3 SENSORINEURAL HEARING LOSS (SNHL) OF BOTH EARS: Status: ACTIVE | Noted: 2019-04-04

## 2021-05-11 PROBLEM — G47.00 PERSISTENT DISORDER OF INITIATING OR MAINTAINING SLEEP: Status: ACTIVE | Noted: 2019-04-10

## 2021-05-11 PROBLEM — G91.2 NPH (NORMAL PRESSURE HYDROCEPHALUS) (HCC): Status: ACTIVE | Noted: 2018-05-29

## 2021-05-11 PROBLEM — R29.898 WEAKNESS OF LEFT LEG: Status: ACTIVE | Noted: 2017-07-13

## 2021-05-11 PROBLEM — I71.40 ABDOMINAL AORTIC ANEURYSM (AAA) WITHOUT RUPTURE: Status: ACTIVE | Noted: 2019-02-01

## 2021-05-11 PROBLEM — F33.9 RECURRENT MAJOR DEPRESSIVE DISORDER (HCC): Status: ACTIVE | Noted: 2019-01-31

## 2021-05-11 PROBLEM — G93.89 CEREBRAL VENTRICULOMEGALY: Status: ACTIVE | Noted: 2017-06-01

## 2021-05-11 PROBLEM — I10 ESSENTIAL HYPERTENSION: Status: ACTIVE | Noted: 2017-08-21

## 2021-05-11 PROBLEM — E78.00 PURE HYPERCHOLESTEROLEMIA: Status: ACTIVE | Noted: 2017-08-21

## 2021-05-11 PROBLEM — I63.9 ISCHEMIC STROKE (HCC): Status: ACTIVE | Noted: 2017-07-17

## 2021-05-11 PROBLEM — F33.9 RECURRENT MAJOR DEPRESSIVE DISORDER: Status: ACTIVE | Noted: 2019-01-31

## 2021-05-11 PROBLEM — I72.3 ILIAC ANEURYSM: Status: ACTIVE | Noted: 2019-04-12

## 2021-05-11 PROBLEM — I73.9 VASCULAR CLAUDICATION (HCC): Status: ACTIVE | Noted: 2017-09-01

## 2021-05-11 PROBLEM — J34.2 NASAL SEPTAL DEVIATION: Status: ACTIVE | Noted: 2019-04-04

## 2021-05-11 PROBLEM — G47.33 OBSTRUCTIVE SLEEP APNEA SYNDROME: Status: ACTIVE | Noted: 2019-01-31

## 2021-05-11 PROBLEM — I71.4 ABDOMINAL AORTIC ANEURYSM (AAA) WITHOUT RUPTURE (HCC): Status: ACTIVE | Noted: 2019-02-01

## 2021-05-11 PROBLEM — I73.9 VASCULAR CLAUDICATION: Status: ACTIVE | Noted: 2017-09-01

## 2021-05-11 PROBLEM — R41.89 COGNITIVE IMPAIRMENT: Status: ACTIVE | Noted: 2017-11-29

## 2021-05-24 ENCOUNTER — HOSPITAL ENCOUNTER (EMERGENCY)
Facility: HOSPITAL | Age: 71
Discharge: HOME | End: 2021-05-24
Attending: EMERGENCY MEDICINE
Payer: MEDICARE

## 2021-05-24 ENCOUNTER — APPOINTMENT (OUTPATIENT)
Dept: CT IMAGING | Facility: HOSPITAL | Age: 71
End: 2021-05-24
Attending: EMERGENCY MEDICINE
Payer: MEDICARE

## 2021-05-24 VITALS
RESPIRATION RATE: 18 BRPM | TEMPERATURE: 99 F | HEART RATE: 99 BPM | DIASTOLIC BLOOD PRESSURE: 69 MMHG | SYSTOLIC BLOOD PRESSURE: 113 MMHG | OXYGEN SATURATION: 95 %

## 2021-05-24 DIAGNOSIS — S01.01XA LACERATION OF SCALP, INITIAL ENCOUNTER: Primary | ICD-10-CM

## 2021-05-24 PROCEDURE — 12001 RPR S/N/AX/GEN/TRNK 2.5CM/<: CPT | Performed by: EMERGENCY MEDICINE

## 2021-05-24 PROCEDURE — 70450 CT HEAD/BRAIN W/O DYE: CPT | Performed by: EMERGENCY MEDICINE

## 2021-05-24 PROCEDURE — 99284 EMERGENCY DEPT VISIT MOD MDM: CPT | Performed by: EMERGENCY MEDICINE

## 2021-05-24 NOTE — ED PROVIDER NOTES
Patient Seen in: [facility] Emergency Department      History   Patient presents with:  Fall    Stated Complaint: FALL     HPI/Subjective:   HPI    79-year-old male presents for evaluation after a trip and fall.   Patient tripped and fell over his do INTRAOCULAR LENS IMPLANT [id number];  Surgeon:  Da Boyer MD;  Location: [address] [place] [address]   • TONSILLECTOMY                  Social History    Tobacco Use      Smoking status: Former Smoker        Packs/day: 0.50        Years: 30.00        Pack yea FALL  TECHNIQUE:  Noncontrast CT scanning is performed through the brain. Dose reduction techniques were used.  Dose information is transmitted to the [place] [facility] [employer] [facility]) NRDR ([address]) which includes the Dose Index 5/10/2017. Dictated by (CST): Laura Sesay MD on 5/24/2021 at 3:50 PM     Finalized by (CST): Laura Sesay MD on 5/24/2021 at 3:58 PM     SPOKE WITH DR Pepito Garcia. FINDINGS ARE CHRONIC/OLD.   NO ACUTE BLEEDING           MDM          No trauma s

## 2021-05-24 NOTE — ED INITIAL ASSESSMENT (HPI)
PT TO ED FROM HOME AFTER TRIPPING OVER DOG IN GARAGE AND FALLING FROM STANDING HEIGHT, DENIES LOC ON 81 MG ASA.  APPROXIMATE 2\" LACERATION TO BACK OF HEAD

## 2022-05-07 ENCOUNTER — APPOINTMENT (OUTPATIENT)
Dept: CT IMAGING | Facility: HOSPITAL | Age: 72
End: 2022-05-07
Attending: EMERGENCY MEDICINE
Payer: MEDICARE

## 2022-05-07 ENCOUNTER — APPOINTMENT (OUTPATIENT)
Dept: GENERAL RADIOLOGY | Facility: HOSPITAL | Age: 72
End: 2022-05-07
Payer: MEDICARE

## 2022-05-07 ENCOUNTER — HOSPITAL ENCOUNTER (EMERGENCY)
Facility: HOSPITAL | Age: 72
Discharge: HOME | End: 2022-05-07
Attending: EMERGENCY MEDICINE
Payer: MEDICARE

## 2022-05-07 VITALS
DIASTOLIC BLOOD PRESSURE: 98 MMHG | BODY MASS INDEX: 32.51 KG/M2 | HEART RATE: 93 BPM | OXYGEN SATURATION: 98 % | SYSTOLIC BLOOD PRESSURE: 154 MMHG | RESPIRATION RATE: 14 BRPM | WEIGHT: 240 LBS | HEIGHT: 72 IN

## 2022-05-07 DIAGNOSIS — F10.920 ALCOHOLIC INTOXICATION WITHOUT COMPLICATION (HCC): ICD-10-CM

## 2022-05-07 DIAGNOSIS — R53.1 WEAKNESS GENERALIZED: ICD-10-CM

## 2022-05-07 DIAGNOSIS — R41.82 ALTERED MENTAL STATUS, UNSPECIFIED ALTERED MENTAL STATUS TYPE: Primary | ICD-10-CM

## 2022-05-07 LAB
ALBUMIN SERPL-MCNC: 4 G/DL (ref 3.4–5)
ALBUMIN/GLOB SERPL: 1.1 {RATIO} (ref 1–2)
ALP LIVER SERPL-CCNC: 70 U/L
ALT SERPL-CCNC: 42 U/L
ANION GAP SERPL CALC-SCNC: 12 MMOL/L (ref 0–18)
AST SERPL-CCNC: 25 U/L (ref 15–37)
BASOPHILS # BLD AUTO: 0.07 X10(3) UL (ref 0–0.2)
BASOPHILS NFR BLD AUTO: 0.9 %
BILIRUB SERPL-MCNC: 0.4 MG/DL (ref 0.1–2)
BILIRUB UR QL STRIP.AUTO: NEGATIVE
BUN BLD-MCNC: 15 MG/DL (ref 7–18)
CALCIUM BLD-MCNC: 9.6 MG/DL (ref 8.5–10.1)
CHLORIDE SERPL-SCNC: 103 MMOL/L (ref 98–112)
CK SERPL-CCNC: 61 U/L
CLARITY UR REFRACT.AUTO: CLEAR
CO2 SERPL-SCNC: 21 MMOL/L (ref 21–32)
CREAT BLD-MCNC: 1.11 MG/DL
EOSINOPHIL # BLD AUTO: 0.22 X10(3) UL (ref 0–0.7)
EOSINOPHIL NFR BLD AUTO: 2.8 %
ERYTHROCYTE [DISTWIDTH] IN BLOOD BY AUTOMATED COUNT: 12.8 %
ETHANOL SERPL-MCNC: 140 MG/DL (ref ?–3)
GLOBULIN PLAS-MCNC: 3.8 G/DL (ref 2.8–4.4)
GLUCOSE BLD-MCNC: 242 MG/DL (ref 70–99)
GLUCOSE UR STRIP.AUTO-MCNC: >=500 MG/DL
HCT VFR BLD AUTO: 48.5 %
HGB BLD-MCNC: 16.1 G/DL
IMM GRANULOCYTES # BLD AUTO: 0.03 X10(3) UL (ref 0–1)
IMM GRANULOCYTES NFR BLD: 0.4 %
LEUKOCYTE ESTERASE UR QL STRIP.AUTO: NEGATIVE
LYMPHOCYTES # BLD AUTO: 1.88 X10(3) UL (ref 1–4)
LYMPHOCYTES NFR BLD AUTO: 23.8 %
MCH RBC QN AUTO: 31.6 PG (ref 26–34)
MCHC RBC AUTO-ENTMCNC: 33.2 G/DL (ref 31–37)
MCV RBC AUTO: 95.3 FL
MONOCYTES # BLD AUTO: 0.78 X10(3) UL (ref 0.1–1)
MONOCYTES NFR BLD AUTO: 9.9 %
NEUTROPHILS # BLD AUTO: 4.91 X10 (3) UL (ref 1.5–7.7)
NEUTROPHILS # BLD AUTO: 4.91 X10(3) UL (ref 1.5–7.7)
NEUTROPHILS NFR BLD AUTO: 62.2 %
NITRITE UR QL STRIP.AUTO: NEGATIVE
OSMOLALITY SERPL CALC.SUM OF ELEC: 291 MOSM/KG (ref 275–295)
PH UR STRIP.AUTO: 6 [PH] (ref 5–8)
PLATELET # BLD AUTO: 195 10(3)UL (ref 150–450)
POTASSIUM SERPL-SCNC: 4.2 MMOL/L (ref 3.5–5.1)
PROT SERPL-MCNC: 7.8 G/DL (ref 6.4–8.2)
PROT UR STRIP.AUTO-MCNC: NEGATIVE MG/DL
PSA SERPL-MCNC: 8.36 NG/ML (ref ?–4)
RBC # BLD AUTO: 5.09 X10(6)UL
RBC UR QL AUTO: NEGATIVE
SARS-COV-2 RNA RESP QL NAA+PROBE: NOT DETECTED
SODIUM SERPL-SCNC: 136 MMOL/L (ref 136–145)
SP GR UR STRIP.AUTO: 1 (ref 1–1.03)
UROBILINOGEN UR STRIP.AUTO-MCNC: <2 MG/DL
WBC # BLD AUTO: 7.9 X10(3) UL (ref 4–11)

## 2022-05-07 PROCEDURE — 71045 X-RAY EXAM CHEST 1 VIEW: CPT | Performed by: EMERGENCY MEDICINE

## 2022-05-07 PROCEDURE — 85025 COMPLETE CBC W/AUTO DIFF WBC: CPT

## 2022-05-07 PROCEDURE — 93005 ELECTROCARDIOGRAM TRACING: CPT

## 2022-05-07 PROCEDURE — 80053 COMPREHEN METABOLIC PANEL: CPT | Performed by: EMERGENCY MEDICINE

## 2022-05-07 PROCEDURE — 81003 URINALYSIS AUTO W/O SCOPE: CPT | Performed by: EMERGENCY MEDICINE

## 2022-05-07 PROCEDURE — 70450 CT HEAD/BRAIN W/O DYE: CPT | Performed by: EMERGENCY MEDICINE

## 2022-05-07 PROCEDURE — 36415 COLL VENOUS BLD VENIPUNCTURE: CPT

## 2022-05-07 PROCEDURE — 82550 ASSAY OF CK (CPK): CPT | Performed by: EMERGENCY MEDICINE

## 2022-05-07 PROCEDURE — 99284 EMERGENCY DEPT VISIT MOD MDM: CPT

## 2022-05-07 PROCEDURE — 85025 COMPLETE CBC W/AUTO DIFF WBC: CPT | Performed by: EMERGENCY MEDICINE

## 2022-05-07 PROCEDURE — 82077 ASSAY SPEC XCP UR&BREATH IA: CPT | Performed by: EMERGENCY MEDICINE

## 2022-05-07 PROCEDURE — 93010 ELECTROCARDIOGRAM REPORT: CPT

## 2022-05-07 PROCEDURE — 99285 EMERGENCY DEPT VISIT HI MDM: CPT

## 2022-05-07 PROCEDURE — 84153 ASSAY OF PSA TOTAL: CPT | Performed by: EMERGENCY MEDICINE

## 2022-05-07 NOTE — ED INITIAL ASSESSMENT (HPI)
Patient states \"I was stumbling earlier and I could feel myself stumbling. \" Patient was lowered to ground by wife. When medics arrived patient was in a supine position. + incontinence and confusion. Patient now states \" I feel ok now but a little weak. \"

## 2022-05-08 LAB
ATRIAL RATE: 98 BPM
P AXIS: 35 DEGREES
P-R INTERVAL: 156 MS
Q-T INTERVAL: 380 MS
QRS DURATION: 106 MS
QTC CALCULATION (BEZET): 485 MS
R AXIS: 12 DEGREES
T AXIS: -10 DEGREES
VENTRICULAR RATE: 98 BPM

## 2022-10-18 PROBLEM — R83.8 ELEVATED CSF PROTEIN: Status: RESOLVED | Noted: 2017-06-01 | Resolved: 2022-10-18

## 2022-10-18 PROBLEM — G91.2 NPH (NORMAL PRESSURE HYDROCEPHALUS) (HCC): Status: RESOLVED | Noted: 2018-05-29 | Resolved: 2022-10-18

## 2022-10-18 PROBLEM — C61 ADENOCARCINOMA OF PROSTATE (HCC): Status: RESOLVED | Noted: 2018-08-17 | Resolved: 2022-10-18

## 2022-10-18 PROBLEM — Q28.2 AVM (ARTERIOVENOUS MALFORMATION) BRAIN: Status: ACTIVE | Noted: 2022-10-18

## 2022-10-18 PROBLEM — G93.89 CEREBRAL VENTRICULOMEGALY: Status: RESOLVED | Noted: 2017-06-01 | Resolved: 2022-10-18

## 2022-10-18 PROBLEM — I63.9 ISCHEMIC STROKE (HCC): Status: RESOLVED | Noted: 2017-07-17 | Resolved: 2022-10-18

## 2022-10-18 PROBLEM — J34.2 NASAL SEPTAL DEVIATION: Status: RESOLVED | Noted: 2019-04-04 | Resolved: 2022-10-18

## 2022-10-18 PROBLEM — Z85.9 PERSONAL HISTORY OF MALIGNANT NEUROENDOCRINE TUMOR: Status: ACTIVE | Noted: 2022-10-18

## 2022-10-18 PROBLEM — G62.9 NEUROPATHY: Status: RESOLVED | Noted: 2017-07-13 | Resolved: 2022-10-18

## 2022-10-18 PROBLEM — E11.59 HYPERTENSION ASSOCIATED WITH DIABETES (HCC): Status: ACTIVE | Noted: 2017-08-21

## 2022-10-18 PROBLEM — E11.59 HYPERTENSION ASSOCIATED WITH DIABETES: Status: ACTIVE | Noted: 2017-08-21

## 2022-10-18 PROBLEM — I61.5 INTRAVENTRICULAR HEMORRHAGE (HCC): Status: RESOLVED | Noted: 2017-07-13 | Resolved: 2022-10-18

## 2022-10-18 PROBLEM — Z86.73 HISTORY OF STROKE: Status: ACTIVE | Noted: 2022-10-18

## 2022-10-18 PROBLEM — J34.3 HYPERTROPHY, NASAL, TURBINATE: Status: RESOLVED | Noted: 2019-04-23 | Resolved: 2022-10-18

## 2022-10-18 PROBLEM — I15.2 HYPERTENSION ASSOCIATED WITH DIABETES: Status: ACTIVE | Noted: 2017-08-21

## 2022-10-18 PROBLEM — E11.9 TYPE 2 DIABETES MELLITUS WITHOUT COMPLICATION, WITHOUT LONG-TERM CURRENT USE OF INSULIN (HCC): Status: ACTIVE | Noted: 2022-10-18

## 2022-10-18 PROBLEM — R90.89 ABNORMAL FINDING ON MRI OF BRAIN: Status: RESOLVED | Noted: 2017-04-10 | Resolved: 2022-10-18

## 2022-10-18 PROBLEM — Z85.46 HISTORY OF PROSTATE CANCER: Status: ACTIVE | Noted: 2022-10-18

## 2022-10-18 PROBLEM — I73.9 VASCULAR CLAUDICATION (HCC): Status: RESOLVED | Noted: 2017-09-01 | Resolved: 2022-10-18

## 2022-10-18 PROBLEM — I15.2 HYPERTENSION ASSOCIATED WITH DIABETES  (HCC): Status: ACTIVE | Noted: 2017-08-21

## 2022-10-18 PROBLEM — I15.2 HYPERTENSION ASSOCIATED WITH DIABETES (HCC): Status: ACTIVE | Noted: 2017-08-21

## 2022-10-18 PROBLEM — I73.9 VASCULAR CLAUDICATION: Status: RESOLVED | Noted: 2017-09-01 | Resolved: 2022-10-18

## 2022-10-18 PROBLEM — R29.898 WEAKNESS OF LEFT LEG: Status: RESOLVED | Noted: 2017-07-13 | Resolved: 2022-10-18

## 2022-10-18 PROBLEM — I10 ESSENTIAL HYPERTENSION: Status: RESOLVED | Noted: 2017-08-21 | Resolved: 2022-10-18

## 2022-10-18 PROBLEM — E78.00 PURE HYPERCHOLESTEROLEMIA: Status: RESOLVED | Noted: 2017-08-21 | Resolved: 2022-10-18

## 2022-10-18 PROBLEM — Q28.2 AVM (ARTERIOVENOUS MALFORMATION) BRAIN (HCC): Status: ACTIVE | Noted: 2022-10-18

## 2022-10-18 PROBLEM — C25.4 MALIGNANT NEOPLASM OF ENDOCRINE PANCREAS (HCC): Status: RESOLVED | Noted: 2018-08-16 | Resolved: 2022-10-18

## 2022-10-18 PROBLEM — E11.59 HYPERTENSION ASSOCIATED WITH DIABETES  (HCC): Status: ACTIVE | Noted: 2017-08-21

## 2022-10-20 ENCOUNTER — LAB ENCOUNTER (OUTPATIENT)
Dept: LAB | Age: 72
End: 2022-10-20
Attending: INTERNAL MEDICINE
Payer: MEDICARE

## 2022-10-20 ENCOUNTER — OFFICE VISIT (OUTPATIENT)
Dept: INTERNAL MEDICINE CLINIC | Facility: CLINIC | Age: 72
End: 2022-10-20
Payer: MEDICARE

## 2022-10-20 VITALS
SYSTOLIC BLOOD PRESSURE: 136 MMHG | DIASTOLIC BLOOD PRESSURE: 82 MMHG | TEMPERATURE: 98 F | RESPIRATION RATE: 16 BRPM | OXYGEN SATURATION: 97 % | HEART RATE: 88 BPM | HEIGHT: 72 IN | WEIGHT: 234 LBS | BODY MASS INDEX: 31.69 KG/M2

## 2022-10-20 DIAGNOSIS — G91.2 NPH (NORMAL PRESSURE HYDROCEPHALUS) (HCC): ICD-10-CM

## 2022-10-20 DIAGNOSIS — G21.4 VASCULAR PARKINSONISM (HCC): ICD-10-CM

## 2022-10-20 DIAGNOSIS — R41.89 COGNITIVE IMPAIRMENT: ICD-10-CM

## 2022-10-20 DIAGNOSIS — I71.43 INFRARENAL ABDOMINAL AORTIC ANEURYSM (AAA) WITHOUT RUPTURE: ICD-10-CM

## 2022-10-20 DIAGNOSIS — I25.10 CORONARY ARTERY CALCIFICATION SEEN ON CT SCAN: ICD-10-CM

## 2022-10-20 DIAGNOSIS — E78.5 HYPERLIPIDEMIA ASSOCIATED WITH TYPE 2 DIABETES MELLITUS (HCC): ICD-10-CM

## 2022-10-20 DIAGNOSIS — I73.9 PERIPHERAL ARTERIAL DISEASE (HCC): ICD-10-CM

## 2022-10-20 DIAGNOSIS — G47.00 PERSISTENT DISORDER OF INITIATING OR MAINTAINING SLEEP: ICD-10-CM

## 2022-10-20 DIAGNOSIS — H90.3 SENSORINEURAL HEARING LOSS (SNHL) OF BOTH EARS: ICD-10-CM

## 2022-10-20 DIAGNOSIS — F33.1 MODERATE EPISODE OF RECURRENT MAJOR DEPRESSIVE DISORDER (HCC): ICD-10-CM

## 2022-10-20 DIAGNOSIS — E11.69 HYPERLIPIDEMIA ASSOCIATED WITH TYPE 2 DIABETES MELLITUS (HCC): ICD-10-CM

## 2022-10-20 DIAGNOSIS — Z00.00 ANNUAL PHYSICAL EXAM: Primary | ICD-10-CM

## 2022-10-20 DIAGNOSIS — E11.59 HYPERTENSION ASSOCIATED WITH DIABETES (HCC): ICD-10-CM

## 2022-10-20 DIAGNOSIS — I15.2 HYPERTENSION ASSOCIATED WITH DIABETES (HCC): ICD-10-CM

## 2022-10-20 DIAGNOSIS — Q28.2 AVM (ARTERIOVENOUS MALFORMATION) BRAIN: ICD-10-CM

## 2022-10-20 DIAGNOSIS — E11.9 TYPE 2 DIABETES MELLITUS WITHOUT COMPLICATION, WITHOUT LONG-TERM CURRENT USE OF INSULIN (HCC): ICD-10-CM

## 2022-10-20 DIAGNOSIS — Z23 NEED FOR INFLUENZA VACCINATION: ICD-10-CM

## 2022-10-20 DIAGNOSIS — G47.33 OBSTRUCTIVE SLEEP APNEA SYNDROME: ICD-10-CM

## 2022-10-20 DIAGNOSIS — Z85.46 HISTORY OF PROSTATE CANCER: ICD-10-CM

## 2022-10-20 DIAGNOSIS — Z85.9 PERSONAL HISTORY OF MALIGNANT NEUROENDOCRINE TUMOR: ICD-10-CM

## 2022-10-20 DIAGNOSIS — I72.3 ILIAC ANEURYSM (HCC): ICD-10-CM

## 2022-10-20 DIAGNOSIS — Z86.73 HISTORY OF STROKE: ICD-10-CM

## 2022-10-20 DIAGNOSIS — Z00.00 ENCOUNTER FOR ANNUAL HEALTH EXAMINATION: ICD-10-CM

## 2022-10-20 PROBLEM — R80.9 MICROALBUMINURIA: Status: RESOLVED | Noted: 2019-02-07 | Resolved: 2022-10-20

## 2022-10-20 LAB
ALBUMIN SERPL-MCNC: 3.7 G/DL (ref 3.4–5)
ALBUMIN/GLOB SERPL: 1.1 {RATIO} (ref 1–2)
ALP LIVER SERPL-CCNC: 68 U/L
ALT SERPL-CCNC: 37 U/L
ANION GAP SERPL CALC-SCNC: 8 MMOL/L (ref 0–18)
AST SERPL-CCNC: 15 U/L (ref 15–37)
BASOPHILS # BLD AUTO: 0.06 X10(3) UL (ref 0–0.2)
BASOPHILS NFR BLD AUTO: 0.9 %
BILIRUB SERPL-MCNC: 0.5 MG/DL (ref 0.1–2)
BILIRUB UR QL STRIP.AUTO: NEGATIVE
BUN BLD-MCNC: 19 MG/DL (ref 7–18)
CALCIUM BLD-MCNC: 9.1 MG/DL (ref 8.5–10.1)
CHLORIDE SERPL-SCNC: 105 MMOL/L (ref 98–112)
CHOLEST SERPL-MCNC: 193 MG/DL (ref ?–200)
CLARITY UR REFRACT.AUTO: CLEAR
CO2 SERPL-SCNC: 25 MMOL/L (ref 21–32)
COLOR UR AUTO: YELLOW
CREAT BLD-MCNC: 1.23 MG/DL
CREAT UR-SCNC: 49.2 MG/DL
EOSINOPHIL # BLD AUTO: 0.19 X10(3) UL (ref 0–0.7)
EOSINOPHIL NFR BLD AUTO: 2.9 %
ERYTHROCYTE [DISTWIDTH] IN BLOOD BY AUTOMATED COUNT: 12.8 %
EST. AVERAGE GLUCOSE BLD GHB EST-MCNC: 223 MG/DL (ref 68–126)
FASTING PATIENT LIPID ANSWER: YES
FASTING STATUS PATIENT QL REPORTED: YES
GFR SERPLBLD BASED ON 1.73 SQ M-ARVRAT: 62 ML/MIN/1.73M2 (ref 60–?)
GLOBULIN PLAS-MCNC: 3.5 G/DL (ref 2.8–4.4)
GLUCOSE BLD-MCNC: 362 MG/DL (ref 70–99)
GLUCOSE UR STRIP.AUTO-MCNC: >=500 MG/DL
HBA1C MFR BLD: 9.4 % (ref ?–5.7)
HCT VFR BLD AUTO: 46 %
HDLC SERPL-MCNC: 26 MG/DL (ref 40–59)
HGB BLD-MCNC: 15.2 G/DL
IMM GRANULOCYTES # BLD AUTO: 0.01 X10(3) UL (ref 0–1)
IMM GRANULOCYTES NFR BLD: 0.2 %
KETONES UR STRIP.AUTO-MCNC: NEGATIVE MG/DL
LDLC SERPL CALC-MCNC: 82 MG/DL (ref ?–100)
LEUKOCYTE ESTERASE UR QL STRIP.AUTO: NEGATIVE
LYMPHOCYTES # BLD AUTO: 1.18 X10(3) UL (ref 1–4)
LYMPHOCYTES NFR BLD AUTO: 17.8 %
MCH RBC QN AUTO: 31.9 PG (ref 26–34)
MCHC RBC AUTO-ENTMCNC: 33 G/DL (ref 31–37)
MCV RBC AUTO: 96.6 FL
MICROALBUMIN UR-MCNC: 7.59 MG/DL
MICROALBUMIN/CREAT 24H UR-RTO: 154.3 UG/MG (ref ?–30)
MONOCYTES # BLD AUTO: 0.68 X10(3) UL (ref 0.1–1)
MONOCYTES NFR BLD AUTO: 10.3 %
NEUTROPHILS # BLD AUTO: 4.51 X10 (3) UL (ref 1.5–7.7)
NEUTROPHILS # BLD AUTO: 4.51 X10(3) UL (ref 1.5–7.7)
NEUTROPHILS NFR BLD AUTO: 67.9 %
NITRITE UR QL STRIP.AUTO: NEGATIVE
NONHDLC SERPL-MCNC: 167 MG/DL (ref ?–130)
OSMOLALITY SERPL CALC.SUM OF ELEC: 303 MOSM/KG (ref 275–295)
PH UR STRIP.AUTO: 6 [PH] (ref 5–8)
PLATELET # BLD AUTO: 189 10(3)UL (ref 150–450)
POTASSIUM SERPL-SCNC: 4.3 MMOL/L (ref 3.5–5.1)
PROT SERPL-MCNC: 7.2 G/DL (ref 6.4–8.2)
PROT UR STRIP.AUTO-MCNC: NEGATIVE MG/DL
PSA SERPL-MCNC: 10.3 NG/ML (ref ?–4)
RBC # BLD AUTO: 4.76 X10(6)UL
RBC UR QL AUTO: NEGATIVE
SODIUM SERPL-SCNC: 138 MMOL/L (ref 136–145)
SP GR UR STRIP.AUTO: >1.03 (ref 1–1.03)
T4 FREE SERPL-MCNC: 1 NG/DL (ref 0.8–1.7)
TRIGL SERPL-MCNC: 525 MG/DL (ref 30–149)
TSI SER-ACNC: 4.3 MIU/ML (ref 0.36–3.74)
UROBILINOGEN UR STRIP.AUTO-MCNC: <2 MG/DL
VLDLC SERPL CALC-MCNC: 85 MG/DL (ref 0–30)
WBC # BLD AUTO: 6.6 X10(3) UL (ref 4–11)

## 2022-10-20 PROCEDURE — 80053 COMPREHEN METABOLIC PANEL: CPT

## 2022-10-20 PROCEDURE — 80061 LIPID PANEL: CPT

## 2022-10-20 PROCEDURE — 85025 COMPLETE CBC W/AUTO DIFF WBC: CPT

## 2022-10-20 PROCEDURE — 82570 ASSAY OF URINE CREATININE: CPT

## 2022-10-20 PROCEDURE — 83036 HEMOGLOBIN GLYCOSYLATED A1C: CPT

## 2022-10-20 PROCEDURE — 36415 COLL VENOUS BLD VENIPUNCTURE: CPT

## 2022-10-20 PROCEDURE — 82043 UR ALBUMIN QUANTITATIVE: CPT

## 2022-10-20 PROCEDURE — 84439 ASSAY OF FREE THYROXINE: CPT

## 2022-10-20 PROCEDURE — 81003 URINALYSIS AUTO W/O SCOPE: CPT

## 2022-10-20 PROCEDURE — 84443 ASSAY THYROID STIM HORMONE: CPT

## 2022-10-20 PROCEDURE — 84153 ASSAY OF PSA TOTAL: CPT

## 2022-10-20 RX ORDER — ESCITALOPRAM OXALATE 10 MG/1
10 TABLET ORAL DAILY
Qty: 90 TABLET | Refills: 0 | Status: SHIPPED | OUTPATIENT
Start: 2022-10-20

## 2022-10-20 RX ORDER — LISINOPRIL 10 MG/1
10 TABLET ORAL EVERY MORNING
Qty: 90 TABLET | Refills: 3 | Status: SHIPPED | OUTPATIENT
Start: 2022-10-20

## 2022-10-20 RX ORDER — METFORMIN HYDROCHLORIDE 750 MG/1
750 TABLET, EXTENDED RELEASE ORAL DAILY
Qty: 90 TABLET | Refills: 1 | Status: SHIPPED | OUTPATIENT
Start: 2022-10-20

## 2022-10-20 RX ORDER — ROSUVASTATIN CALCIUM 20 MG/1
20 TABLET, COATED ORAL DAILY
Qty: 90 TABLET | Refills: 3 | Status: SHIPPED | OUTPATIENT
Start: 2022-10-20

## 2022-10-21 DIAGNOSIS — E11.9 TYPE 2 DIABETES MELLITUS WITHOUT COMPLICATION, WITHOUT LONG-TERM CURRENT USE OF INSULIN (HCC): Primary | ICD-10-CM

## 2022-10-21 RX ORDER — GLIMEPIRIDE 2 MG/1
2 TABLET ORAL
Qty: 90 TABLET | Refills: 0 | Status: SHIPPED | OUTPATIENT
Start: 2022-10-21

## 2022-10-24 ENCOUNTER — TELEPHONE (OUTPATIENT)
Dept: INTERNAL MEDICINE CLINIC | Facility: CLINIC | Age: 72
End: 2022-10-24

## 2022-10-24 DIAGNOSIS — E11.9 TYPE 2 DIABETES MELLITUS WITHOUT COMPLICATION, WITHOUT LONG-TERM CURRENT USE OF INSULIN (HCC): Primary | ICD-10-CM

## 2022-10-24 RX ORDER — BLOOD SUGAR DIAGNOSTIC
1 STRIP MISCELLANEOUS DAILY
Qty: 100 STRIP | Refills: 1 | Status: SHIPPED | OUTPATIENT
Start: 2022-10-24

## 2022-10-24 RX ORDER — BLOOD-GLUCOSE METER
1 EACH MISCELLANEOUS DAILY
Qty: 1 KIT | Refills: 0 | Status: SHIPPED | OUTPATIENT
Start: 2022-10-24

## 2022-10-24 RX ORDER — LANCETS
1 EACH MISCELLANEOUS DAILY
Qty: 102 EACH | Refills: 1 | Status: SHIPPED | OUTPATIENT
Start: 2022-10-24 | End: 2023-10-24

## 2022-10-24 NOTE — TELEPHONE ENCOUNTER
Verbal order given to Nithya, [facility]. Glucometer, test strips and lancets sent to [facility] on file.

## 2022-10-24 NOTE — TELEPHONE ENCOUNTER
Asking add on order or Physical Therapy   Verbal order needed.     Nursing 9 weeks/2 months  For uncontrollable diabilities    Requesting Glucose Meter, Strips

## 2022-10-31 ENCOUNTER — MED REC SCAN ONLY (OUTPATIENT)
Dept: INTERNAL MEDICINE CLINIC | Facility: CLINIC | Age: 72
End: 2022-10-31

## 2022-11-03 ENCOUNTER — TELEPHONE (OUTPATIENT)
Dept: INTERNAL MEDICINE CLINIC | Facility: CLINIC | Age: 72
End: 2022-11-03

## 2022-11-03 NOTE — TELEPHONE ENCOUNTER
Entire buttock area stage 1    Needs order for desitin cream verbal    Not showering and informed he needs to shower

## 2022-11-03 NOTE — TELEPHONE ENCOUNTER
Verbal orders given for Desitin Qid after using toilet and after changing diaper    HH RN reported, pt is not taking shower and verbalized hygine is an issue, wife goes out of town frequently 8-10 days at a time  Pt refused shower aid, wife was updated by [facility] RN regarding pt to keep toes clean and taking showers  Wife v/u    Routed to provider for [employer]

## 2022-11-04 ENCOUNTER — TELEPHONE (OUTPATIENT)
Dept: INTERNAL MEDICINE CLINIC | Facility: CLINIC | Age: 72
End: 2022-11-04

## 2022-11-09 ENCOUNTER — TELEPHONE (OUTPATIENT)
Dept: INTERNAL MEDICINE CLINIC | Facility: CLINIC | Age: 72
End: 2022-11-09

## 2022-11-10 ENCOUNTER — TELEPHONE (OUTPATIENT)
Dept: INTERNAL MEDICINE CLINIC | Facility: CLINIC | Age: 72
End: 2022-11-10

## 2022-11-10 NOTE — TELEPHONE ENCOUNTER
FYI- Pt missed PT visit this week, but pt and family requested to cancel for this week and will follow up as planned next week

## 2022-11-17 ENCOUNTER — TELEPHONE (OUTPATIENT)
Dept: INTERNAL MEDICINE CLINIC | Facility: CLINIC | Age: 72
End: 2022-11-17

## 2022-11-17 NOTE — TELEPHONE ENCOUNTER
Scheduled fot PT today, called and canceled. Request for follow up next week. Pt reports doing well medically.

## 2022-11-18 ENCOUNTER — TELEPHONE (OUTPATIENT)
Dept: INTERNAL MEDICINE CLINIC | Facility: CLINIC | Age: 72
End: 2022-11-18

## 2022-11-18 NOTE — TELEPHONE ENCOUNTER
Spoke with spouse she was out of town and pt was not comfortable with anyone coming in to the home. She will contact Clari to schedule appt for next week. 99

## 2022-11-18 NOTE — TELEPHONE ENCOUNTER
[place] nurse stated that patient is not responding when she is attempting to schedule appointments. She wanted Dr. Brown Osborne to know.

## 2022-11-28 ENCOUNTER — TELEPHONE (OUTPATIENT)
Dept: INTERNAL MEDICINE CLINIC | Facility: CLINIC | Age: 72
End: 2022-11-28

## 2022-12-06 ENCOUNTER — MED REC SCAN ONLY (OUTPATIENT)
Dept: INTERNAL MEDICINE CLINIC | Facility: CLINIC | Age: 72
End: 2022-12-06

## 2022-12-27 ENCOUNTER — TELEPHONE (OUTPATIENT)
Dept: INTERNAL MEDICINE CLINIC | Facility: CLINIC | Age: 72
End: 2022-12-27

## 2022-12-27 NOTE — TELEPHONE ENCOUNTER
Recert was completed last week, [facility] has tried contacting patient multiple times with no answer or call back. Per therapist, patient was doing well at last visit. However, if patient is in need of continued [facility] therapy a new recert will need to be completed. Jillian Umaña for patient update and forwarding above update to Dr. Ashanti Love to make aware.

## 2023-01-03 ENCOUNTER — MED REC SCAN ONLY (OUTPATIENT)
Dept: INTERNAL MEDICINE CLINIC | Facility: CLINIC | Age: 73
End: 2023-01-03

## 2023-01-09 ENCOUNTER — OFFICE VISIT (OUTPATIENT)
Dept: INTERNAL MEDICINE CLINIC | Facility: CLINIC | Age: 73
End: 2023-01-09
Payer: MEDICARE

## 2023-01-09 ENCOUNTER — TELEPHONE (OUTPATIENT)
Dept: INTERNAL MEDICINE CLINIC | Facility: CLINIC | Age: 73
End: 2023-01-09

## 2023-01-09 ENCOUNTER — LAB ENCOUNTER (OUTPATIENT)
Dept: LAB | Age: 73
End: 2023-01-09
Attending: INTERNAL MEDICINE
Payer: MEDICARE

## 2023-01-09 VITALS
HEIGHT: 72 IN | BODY MASS INDEX: 30.61 KG/M2 | OXYGEN SATURATION: 97 % | RESPIRATION RATE: 16 BRPM | HEART RATE: 98 BPM | DIASTOLIC BLOOD PRESSURE: 70 MMHG | SYSTOLIC BLOOD PRESSURE: 126 MMHG | TEMPERATURE: 98 F | WEIGHT: 226 LBS

## 2023-01-09 DIAGNOSIS — R29.898 WEAKNESS OF BOTH LOWER EXTREMITIES: Primary | ICD-10-CM

## 2023-01-09 DIAGNOSIS — I10 ESSENTIAL HYPERTENSION: ICD-10-CM

## 2023-01-09 DIAGNOSIS — E11.9 TYPE 2 DIABETES MELLITUS WITHOUT COMPLICATION, WITHOUT LONG-TERM CURRENT USE OF INSULIN (HCC): Primary | ICD-10-CM

## 2023-01-09 DIAGNOSIS — G21.4 VASCULAR PARKINSONISM (HCC): ICD-10-CM

## 2023-01-09 DIAGNOSIS — Z12.11 COLON CANCER SCREENING: ICD-10-CM

## 2023-01-09 DIAGNOSIS — E11.9 TYPE 2 DIABETES MELLITUS WITHOUT COMPLICATION, WITHOUT LONG-TERM CURRENT USE OF INSULIN (HCC): ICD-10-CM

## 2023-01-09 PROBLEM — N40.0 ENLARGED PROSTATE: Status: RESOLVED | Noted: 2022-11-08 | Resolved: 2023-01-09

## 2023-01-09 PROBLEM — N40.0 ENLARGED PROSTATE: Status: ACTIVE | Noted: 2022-11-08

## 2023-01-09 LAB
ALBUMIN SERPL-MCNC: 4.3 G/DL (ref 3.4–5)
ALBUMIN/GLOB SERPL: 1.2 {RATIO} (ref 1–2)
ALP LIVER SERPL-CCNC: 56 U/L
ALT SERPL-CCNC: 25 U/L
ANION GAP SERPL CALC-SCNC: 0 MMOL/L (ref 0–18)
AST SERPL-CCNC: 14 U/L (ref 15–37)
BILIRUB SERPL-MCNC: 0.4 MG/DL (ref 0.1–2)
BUN BLD-MCNC: 18 MG/DL (ref 7–18)
CALCIUM BLD-MCNC: 9.6 MG/DL (ref 8.5–10.1)
CHLORIDE SERPL-SCNC: 108 MMOL/L (ref 98–112)
CO2 SERPL-SCNC: 27 MMOL/L (ref 21–32)
CREAT BLD-MCNC: 1.18 MG/DL
EST. AVERAGE GLUCOSE BLD GHB EST-MCNC: 157 MG/DL (ref 68–126)
FASTING STATUS PATIENT QL REPORTED: NO
GFR SERPLBLD BASED ON 1.73 SQ M-ARVRAT: 65 ML/MIN/1.73M2 (ref 60–?)
GLOBULIN PLAS-MCNC: 3.6 G/DL (ref 2.8–4.4)
GLUCOSE BLD-MCNC: 159 MG/DL (ref 70–99)
HBA1C MFR BLD: 7.1 % (ref ?–5.7)
OSMOLALITY SERPL CALC.SUM OF ELEC: 285 MOSM/KG (ref 275–295)
POTASSIUM SERPL-SCNC: 4 MMOL/L (ref 3.5–5.1)
PROT SERPL-MCNC: 7.9 G/DL (ref 6.4–8.2)
SODIUM SERPL-SCNC: 135 MMOL/L (ref 136–145)

## 2023-01-09 PROCEDURE — 83036 HEMOGLOBIN GLYCOSYLATED A1C: CPT

## 2023-01-09 PROCEDURE — 99214 OFFICE O/P EST MOD 30 MIN: CPT | Performed by: INTERNAL MEDICINE

## 2023-01-09 PROCEDURE — 80053 COMPREHEN METABOLIC PANEL: CPT

## 2023-01-09 PROCEDURE — 36415 COLL VENOUS BLD VENIPUNCTURE: CPT

## 2023-01-09 RX ORDER — GLIMEPIRIDE 2 MG/1
4 TABLET ORAL
Qty: 180 TABLET | Refills: 0 | COMMUNITY
Start: 2023-01-09

## 2023-01-09 NOTE — TELEPHONE ENCOUNTER
Wife would like to extend PT for 6 more weeks     Pt is depressed and feels this would help him get out of the house

## 2023-01-09 NOTE — TELEPHONE ENCOUNTER
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  Would like order faxed to [email or web address]. Ph: [phone] Fx:  [phone]    Order faxed.

## 2023-02-13 DIAGNOSIS — I15.2 HYPERTENSION ASSOCIATED WITH DIABETES (HCC): ICD-10-CM

## 2023-02-13 DIAGNOSIS — E11.59 HYPERTENSION ASSOCIATED WITH DIABETES (HCC): ICD-10-CM

## 2023-02-13 DIAGNOSIS — Z86.73 HISTORY OF STROKE: ICD-10-CM

## 2023-02-13 RX ORDER — LISINOPRIL 10 MG/1
10 TABLET ORAL EVERY MORNING
Qty: 90 TABLET | Refills: 3 | Status: SHIPPED | OUTPATIENT
Start: 2023-02-13

## 2023-02-21 ENCOUNTER — TELEPHONE (OUTPATIENT)
Dept: INTERNAL MEDICINE CLINIC | Facility: CLINIC | Age: 73
End: 2023-02-21

## 2023-02-21 NOTE — TELEPHONE ENCOUNTER
Form completed and placed in Dr. Hermosillo Speaks bin to obtain signature. Spouse notified form will be available for  tomorrow.

## 2023-02-22 NOTE — TELEPHONE ENCOUNTER
Form completed. Spoke with spouse and offered to mail form to . She would like us to do that. Form mailed. Copy sent to scan.

## 2023-03-10 DIAGNOSIS — E11.9 TYPE 2 DIABETES MELLITUS WITHOUT COMPLICATION, WITHOUT LONG-TERM CURRENT USE OF INSULIN (HCC): ICD-10-CM

## 2023-03-10 RX ORDER — GLIMEPIRIDE 2 MG/1
TABLET ORAL
Qty: 90 TABLET | Refills: 0 | Status: SHIPPED | OUTPATIENT
Start: 2023-03-10

## 2023-03-23 ENCOUNTER — VIRTUAL PHONE E/M (OUTPATIENT)
Dept: INTERNAL MEDICINE CLINIC | Facility: CLINIC | Age: 73
End: 2023-03-23
Payer: MEDICARE

## 2023-03-23 DIAGNOSIS — O10.019 ESSENTIAL HYPERTENSION ANTEPARTUM: Primary | ICD-10-CM

## 2023-03-23 DIAGNOSIS — F33.1 MODERATE EPISODE OF RECURRENT MAJOR DEPRESSIVE DISORDER (HCC): ICD-10-CM

## 2023-03-23 DIAGNOSIS — Z12.11 COLON CANCER SCREENING: ICD-10-CM

## 2023-03-23 PROCEDURE — 99441 PHONE E/M BY PHYS 5-10 MIN: CPT | Performed by: INTERNAL MEDICINE

## 2023-04-05 DIAGNOSIS — E11.9 TYPE 2 DIABETES MELLITUS WITHOUT COMPLICATION, WITHOUT LONG-TERM CURRENT USE OF INSULIN (HCC): ICD-10-CM

## 2023-04-05 RX ORDER — METFORMIN HYDROCHLORIDE 750 MG/1
TABLET, EXTENDED RELEASE ORAL
Qty: 90 TABLET | Refills: 0 | Status: SHIPPED | OUTPATIENT
Start: 2023-04-05

## 2023-04-06 ENCOUNTER — PATIENT OUTREACH (OUTPATIENT)
Dept: INTERNAL MEDICINE CLINIC | Facility: CLINIC | Age: 73
End: 2023-04-06

## 2023-04-06 NOTE — PROGRESS NOTES
Fax received from [facility] notifying office that multiple call attempts have been made to patient to schedule DM eye exam.    This RN contacted patient. Spoke to spouse Ludwig Krishna [facility]) who was notified patient is over due for DM eye exam. Requesting [facility] contact and address information to schedule to be sent via [employer]. [employer] message sent.

## 2023-05-09 ENCOUNTER — TELEPHONE (OUTPATIENT)
Dept: INTERNAL MEDICINE CLINIC | Facility: CLINIC | Age: 73
End: 2023-05-09

## 2023-05-09 DIAGNOSIS — E11.9 TYPE 2 DIABETES MELLITUS WITHOUT COMPLICATION, WITHOUT LONG-TERM CURRENT USE OF INSULIN (HCC): ICD-10-CM

## 2023-05-09 DIAGNOSIS — E11.9 TYPE 2 DIABETES MELLITUS WITHOUT COMPLICATION, WITHOUT LONG-TERM CURRENT USE OF INSULIN (HCC): Primary | ICD-10-CM

## 2023-05-09 RX ORDER — METFORMIN HYDROCHLORIDE 750 MG/1
750 TABLET, EXTENDED RELEASE ORAL DAILY
Qty: 90 TABLET | Refills: 0 | Status: SHIPPED | OUTPATIENT
Start: 2023-05-09

## 2023-05-09 NOTE — TELEPHONE ENCOUNTER
Refill req     METFORMIN  MG Oral Tablet 24 Hr  90 tablet     [address] [facility] [address] [phone], [phone]    Last OV 01/09/2023  No future appointments. Pt said that he also goes to the [place] and his doctor there said that he was supposed to be taking 750MG twice a day and not just once. Please update his prescription if recommended and follow up with the patient.

## 2023-05-09 NOTE — TELEPHONE ENCOUNTER
Pt just had elevated glucose reading (does not remember) and has since come down    No symptoms    Needs to have toenails clipped and req recommendation on where to go

## 2023-05-09 NOTE — TELEPHONE ENCOUNTER
METFORMIN  MG Oral Tablet 24 Hr  Last time medication was refilled 4/10/23  Quantity and # of refills 90 tab w 0 refills  Last OV VV 3/23/23  Next OV no appt scheduled    Per patient has been taking a total of 1,500 mg of Metformin ER daily for roughly six months. Recent A1C completed through [place] indicated a result of 6.0%. Pt states he has not regularly monitored his FBS at home. Recent CMP on 4/13/23 showed a glucose of 157. Pt to have f/u labs through [place] in three months time. Requesting refill of Metformin.  RX sent to provider for approval.

## 2023-06-08 ENCOUNTER — OFFICE VISIT (OUTPATIENT)
Dept: PODIATRY CLINIC | Facility: CLINIC | Age: 73
End: 2023-06-08

## 2023-06-08 DIAGNOSIS — B35.1 ONYCHOMYCOSIS: ICD-10-CM

## 2023-06-08 DIAGNOSIS — M20.41 HAMMER TOES OF BOTH FEET: ICD-10-CM

## 2023-06-08 DIAGNOSIS — I73.9 PERIPHERAL ARTERIAL DISEASE (HCC): ICD-10-CM

## 2023-06-08 DIAGNOSIS — E11.9 TYPE 2 DIABETES MELLITUS WITHOUT COMPLICATION, WITHOUT LONG-TERM CURRENT USE OF INSULIN (HCC): Primary | ICD-10-CM

## 2023-06-08 DIAGNOSIS — M20.42 HAMMER TOES OF BOTH FEET: ICD-10-CM

## 2023-06-08 PROCEDURE — 99203 OFFICE O/P NEW LOW 30 MIN: CPT | Performed by: STUDENT IN AN ORGANIZED HEALTH CARE EDUCATION/TRAINING PROGRAM

## 2023-06-08 NOTE — PATIENT INSTRUCTIONS
Ambulate with supportive shoes. Check feet daily. Follow-up in 2 months for reevaluation or sooner if other concerns arise.

## 2023-06-09 DIAGNOSIS — F34.1 DYSTHYMIA: ICD-10-CM

## 2023-06-09 DIAGNOSIS — E11.9 TYPE 2 DIABETES MELLITUS WITHOUT COMPLICATION, WITHOUT LONG-TERM CURRENT USE OF INSULIN (HCC): ICD-10-CM

## 2023-06-09 RX ORDER — GLIMEPIRIDE 2 MG/1
TABLET ORAL
Qty: 90 TABLET | Refills: 0 | Status: SHIPPED | OUTPATIENT
Start: 2023-06-09

## 2023-06-09 RX ORDER — ESCITALOPRAM OXALATE 20 MG/1
TABLET ORAL
Qty: 90 TABLET | Refills: 0 | Status: SHIPPED | OUTPATIENT
Start: 2023-06-09

## 2023-08-01 DIAGNOSIS — E11.9 TYPE 2 DIABETES MELLITUS WITHOUT COMPLICATION, WITHOUT LONG-TERM CURRENT USE OF INSULIN (HCC): ICD-10-CM

## 2023-08-01 RX ORDER — METFORMIN HYDROCHLORIDE 750 MG/1
1500 TABLET, EXTENDED RELEASE ORAL DAILY
Qty: 180 TABLET | Refills: 0 | Status: SHIPPED | OUTPATIENT
Start: 2023-08-01

## 2023-08-01 NOTE — TELEPHONE ENCOUNTER
Last time medication was refilled 5/9/23  Quantity and # of refills 90 w 0  Last OV 1/9/23  Next OV No future appts scheduled. Failed protocol.   Sent to Dr. Ever Gomez for approval.

## 2023-09-06 ENCOUNTER — OFFICE VISIT (OUTPATIENT)
Dept: PODIATRY CLINIC | Facility: CLINIC | Age: 73
End: 2023-09-06

## 2023-09-06 DIAGNOSIS — B35.1 ONYCHOMYCOSIS: ICD-10-CM

## 2023-09-06 DIAGNOSIS — M20.42 HAMMER TOES OF BOTH FEET: ICD-10-CM

## 2023-09-06 DIAGNOSIS — I73.9 PERIPHERAL ARTERIAL DISEASE (HCC): ICD-10-CM

## 2023-09-06 DIAGNOSIS — M20.41 HAMMER TOES OF BOTH FEET: ICD-10-CM

## 2023-09-06 DIAGNOSIS — E11.9 TYPE 2 DIABETES MELLITUS WITHOUT COMPLICATION, WITHOUT LONG-TERM CURRENT USE OF INSULIN (HCC): Primary | ICD-10-CM

## 2023-09-06 PROCEDURE — 99213 OFFICE O/P EST LOW 20 MIN: CPT | Performed by: STUDENT IN AN ORGANIZED HEALTH CARE EDUCATION/TRAINING PROGRAM

## 2023-09-06 NOTE — PROGRESS NOTES
[facility], [facility] Podiatry  Progress Note    Reinier Verdin is a 68year old male. Patient presents with:  Diabetic Foot Care: Nail care and foot check- fbs was not checked in the am- A1c 7.1 01/09/23- patient denies pain at this time. HPI:     Patient is a pleasant 45-year-old diabetic male who presents to clinic for diabetic foot exam.  He has elongated and thickened nails he has difficulty trimming on his own. He denies any open sores or other concerns. His blood sugars were not checked this morning. His last hemoglobin A1c was 7.1% on 1/9/2023. No other complaints are mentioned. Past medical history, medications, and allergies reviewed. Allergies: Radiology Contrast Iodinated Dyes, Iodine I 131 Tositumomab, Other, and Iodine (Topical)   Current Outpatient Medications   Medication Sig Dispense Refill    METFORMIN  MG Oral Tablet 24 Hr Take 2 tablets by mouth once daily 180 tablet 0    ESCITALOPRAM 20 MG Oral Tab Take 1 tablet by mouth once daily 90 tablet 0    GLIMEPIRIDE 2 MG Oral Tab TAKE 1 TABLET BY MOUTH IN THE MORNING BEFORE BREAKFAST 90 tablet 0    lisinopril 10 MG Oral Tab Take 1 tablet (10 mg total) by mouth every morning. 90 tablet 3    Glucose Blood (ACCU-CHEK GUIDE) In Vitro Strip 1 each by Finger stick route daily. May dispense any glucometer, strips and lancets that insurance will cover DX: E11.9. Test once daily 100 strip 1    Accu-Chek FastClix Lancets Does not apply Misc 1 lancet by Finger stick route daily. Use as directed. DX. E11.9. May dispense any glucometer, strips and lancets that insurance will cover 102 each 1    Blood Glucose Monitoring Suppl (ACCU-CHEK GUIDE) w/Device Does not apply Kit 1 each daily. Test once daily. DX. E11.9 May dispense any glucometer, strips and lancets that insurance will cover 1 kit 0    rosuvastatin (CRESTOR) 20 MG Oral Tab Take 1 tablet (20 mg total) by mouth daily.  90 tablet 3    tamsulosin 0.4 MG Oral Cap Take 1 capsule (0.4 mg total) by mouth daily. 90 capsule 4    aspirin 81 MG Oral Tab EC Take 1 tablet (81 mg total) by mouth daily. 30 tablet 0      Past Medical History:   Diagnosis Date    Anesthesia complication     slow to awaken    Brain mass 10/19/2012    Essential hypertension     Exposure to radiation 3/2015    24 radiation of prostate prior to seed implantation    High blood pressure     High cholesterol     Lung mass 10/19/2012    Obesity 10/19/2012    Obstructive sleep apnea (adult) (pediatric) 2012    COURT (obstructive sleep apnea) 18 Split Night    AHI 76 RDI 85 REM AHI 51 Supine AHI 77 non-supine AHI 60 Sao2 Gregg 78%     COURT on CPAP 10/19/2012    6- [facility]    Prostate cancer ([address]     s/p SEEDS 13    Sleep apnea     Tobacco dependence 10/19/2012      Past Surgical History:   Procedure Laterality Date    COLONOSCOPY  13= Diverticulosis, Hemorrhoids    Repeat     OTHER      anterior cervical neck sx  - metal    OTHER SURGICAL HISTORY  13    PNBx - Dr. Marvin Carmen  13    [address] [place] [address] CATARACT EXTRACAP,INSERT LENS  2012    Procedure: LEFT PHACOEMULSIFICATION OF CATARACT WITH INTRAOCULAR LENS IMPLANT [id number];  Surgeon: David Taylor MD;  Location: [address] CATARACT EXTRACAP,INSERT LENS  2012    Procedure: RIGHT PHACOEMULSIFICATION OF CATARACT WITH INTRAOCULAR LENS IMPLANT [id number];  Surgeon:  David Taylor MD;  Location: [address] [place] [address]    TONSILLECTOMY        Family History   Problem Relation Age of Onset    Cancer Sister         breast cancer    Cancer Sister     Cancer Sister     Cancer Sister       Social History    Socioeconomic History      Marital status:     Tobacco Use      Smoking status: Former        Packs/day: 0.50        Years: 30.00        Pack years: 15        Types: Cigarettes        Quit date: 2014        Years since quittin.3      Smokeless tobacco: Never    Vaping Use      Vaping Use: Never used    Substance and Sexual Activity      Alcohol use: Not Currently        Alcohol/week: 2.0 standard drinks of alcohol        Types: 2 Cans of beer per week      Drug use: No    Other Topics      Concerns:        Caffeine Concern: Yes          coffee 1-2 daily        Exercise: No          REVIEW OF SYSTEMS:     Today reviewed systems as documented below  GENERAL HEALTH: feels well otherwise  SKIN: denies any unusual skin lesions or rashes  RESPIRATORY: denies shortness of breath with exertion  CARDIOVASCULAR: denies chest pain on exertion  GI: denies abdominal pain and denies heartburn  NEURO: denies headaches  MUSCULO: denies arthritis, back pain      EXAM:   There were no vitals taken for this visit. GENERAL: well developed, well nourished, in no apparent distress  EXTREMITIES:   1. Integument: Normal skin temperature and turgor. Nails x10 are elongated and thickened and with subungual debris. 2. Vascular: Pedal pulses are lightly palpable. 3. Musculoskeletal: All muscle groups are graded 5 out of 5 in the foot and ankle. Flexion contracture of lesser digits. 4. Neurological: Normal sharp dull sensation; reflexes normal.        ASSESSMENT AND PLAN:   Diagnoses and all orders for this visit:    Type 2 diabetes mellitus without complication, without long-term current use of insulin (HCC)    Peripheral arterial disease (HCC)    Onychomycosis    Hammer toes of both feet          Plan:     -Patient examined, chart history reviewed. -Discussed importance of proper pedal hygiene, regular foot checks, and tight glucose control.  -Sharply debrided nails x10 with a sterile nail nipper achieving a 20% reduction in thickness and length, without incident. Nails further smoothed with dremel.  -Ambulate with supportive shoes and inserts and avoid walking barefoot.   -Educated patient on acute signs of infection advised patient to seek immediate medical attention if symptoms arise.     The patient indicates understanding of these issues and agrees to the plan. Return in about 2 months (around 11/6/2023) for routine foot care.     Jana Arias DPM

## 2023-09-08 NOTE — PATIENT INSTRUCTIONS
Ambulate with supportive shoes. Check feet daily. Follow-up in 2 months or sooner if other concerns arise.

## 2023-09-22 DIAGNOSIS — E11.9 TYPE 2 DIABETES MELLITUS WITHOUT COMPLICATION, WITHOUT LONG-TERM CURRENT USE OF INSULIN (HCC): ICD-10-CM

## 2023-09-22 DIAGNOSIS — F34.1 DYSTHYMIA: ICD-10-CM

## 2023-09-22 RX ORDER — GLIMEPIRIDE 2 MG/1
2 TABLET ORAL
Qty: 90 TABLET | Refills: 0 | Status: SHIPPED | OUTPATIENT
Start: 2023-09-22

## 2023-09-22 RX ORDER — ESCITALOPRAM OXALATE 20 MG/1
20 TABLET ORAL DAILY
Qty: 90 TABLET | Refills: 0 | Status: SHIPPED | OUTPATIENT
Start: 2023-09-22

## 2023-09-22 NOTE — TELEPHONE ENCOUNTER
ESCITALOPRAM 20 MG Oral Tab   Last time medication was refilled 06/09/2023  Quantity and # of refills 90 w 0  Last OV 03/23/2023  Next OV No appointment scheduled        GLIMEPIRIDE 2 MG Oral Tab   Last time medication was refilled 06/09/2023  Quantity and # of refills 90 w 0  Last OV 03/23/2023  Next OV No appointment scheduled      Patient due for physical 10/20/2023  to coordinate     Sent to Dr. Ann Rosales for approval

## 2023-10-26 ENCOUNTER — LAB ENCOUNTER (OUTPATIENT)
Dept: LAB | Age: 73
End: 2023-10-26
Attending: INTERNAL MEDICINE

## 2023-10-26 ENCOUNTER — OFFICE VISIT (OUTPATIENT)
Dept: INTERNAL MEDICINE CLINIC | Facility: CLINIC | Age: 73
End: 2023-10-26

## 2023-10-26 VITALS
OXYGEN SATURATION: 95 % | TEMPERATURE: 98 F | DIASTOLIC BLOOD PRESSURE: 60 MMHG | WEIGHT: 234 LBS | HEIGHT: 72 IN | BODY MASS INDEX: 31.69 KG/M2 | HEART RATE: 54 BPM | RESPIRATION RATE: 16 BRPM | SYSTOLIC BLOOD PRESSURE: 118 MMHG

## 2023-10-26 DIAGNOSIS — R94.31 PROLONGED Q-T INTERVAL ON ECG: ICD-10-CM

## 2023-10-26 DIAGNOSIS — E78.5 HYPERLIPIDEMIA ASSOCIATED WITH TYPE 2 DIABETES MELLITUS: ICD-10-CM

## 2023-10-26 DIAGNOSIS — F33.1 MODERATE EPISODE OF RECURRENT MAJOR DEPRESSIVE DISORDER (HCC): ICD-10-CM

## 2023-10-26 DIAGNOSIS — I15.2 HYPERTENSION ASSOCIATED WITH DIABETES: ICD-10-CM

## 2023-10-26 DIAGNOSIS — E55.9 VITAMIN D DEFICIENCY: ICD-10-CM

## 2023-10-26 DIAGNOSIS — E11.8 DIABETES MELLITUS WITH COMPLICATION (HCC): ICD-10-CM

## 2023-10-26 DIAGNOSIS — G21.4 VASCULAR PARKINSONISM (HCC): ICD-10-CM

## 2023-10-26 DIAGNOSIS — Z86.73 HISTORY OF STROKE: ICD-10-CM

## 2023-10-26 DIAGNOSIS — E11.9 TYPE 2 DIABETES MELLITUS WITHOUT COMPLICATION, WITHOUT LONG-TERM CURRENT USE OF INSULIN (HCC): ICD-10-CM

## 2023-10-26 DIAGNOSIS — H90.3 SENSORINEURAL HEARING LOSS (SNHL) OF BOTH EARS: ICD-10-CM

## 2023-10-26 DIAGNOSIS — I72.3 ILIAC ANEURYSM (HCC): ICD-10-CM

## 2023-10-26 DIAGNOSIS — I71.43 INFRARENAL ABDOMINAL AORTIC ANEURYSM (AAA) WITHOUT RUPTURE (HCC): ICD-10-CM

## 2023-10-26 DIAGNOSIS — Z00.00 ANNUAL PHYSICAL EXAM: Primary | ICD-10-CM

## 2023-10-26 DIAGNOSIS — I25.10 CORONARY ARTERY CALCIFICATION SEEN ON CT SCAN: ICD-10-CM

## 2023-10-26 DIAGNOSIS — Z00.00 ENCOUNTER FOR ANNUAL HEALTH EXAMINATION: ICD-10-CM

## 2023-10-26 DIAGNOSIS — Z85.9 PERSONAL HISTORY OF MALIGNANT NEUROENDOCRINE TUMOR: ICD-10-CM

## 2023-10-26 DIAGNOSIS — G47.33 OBSTRUCTIVE SLEEP APNEA SYNDROME: ICD-10-CM

## 2023-10-26 DIAGNOSIS — Q28.2 AVM (ARTERIOVENOUS MALFORMATION) BRAIN: ICD-10-CM

## 2023-10-26 DIAGNOSIS — E11.69 HYPERLIPIDEMIA ASSOCIATED WITH TYPE 2 DIABETES MELLITUS: ICD-10-CM

## 2023-10-26 DIAGNOSIS — Z85.46 HISTORY OF PROSTATE CANCER: ICD-10-CM

## 2023-10-26 DIAGNOSIS — E11.59 HYPERTENSION ASSOCIATED WITH DIABETES: ICD-10-CM

## 2023-10-26 DIAGNOSIS — I73.9 PERIPHERAL ARTERIAL DISEASE (HCC): ICD-10-CM

## 2023-10-26 DIAGNOSIS — G91.2 NPH (NORMAL PRESSURE HYDROCEPHALUS) (HCC): ICD-10-CM

## 2023-10-26 PROBLEM — R41.89 COGNITIVE IMPAIRMENT: Status: RESOLVED | Noted: 2017-11-29 | Resolved: 2023-10-26

## 2023-10-26 PROBLEM — G47.00 PERSISTENT DISORDER OF INITIATING OR MAINTAINING SLEEP: Status: RESOLVED | Noted: 2019-04-10 | Resolved: 2023-10-26

## 2023-10-26 LAB
EST. AVERAGE GLUCOSE BLD GHB EST-MCNC: 128 MG/DL (ref 68–126)
HBA1C MFR BLD: 6.1 % (ref ?–5.7)

## 2023-10-26 PROCEDURE — 82306 VITAMIN D 25 HYDROXY: CPT

## 2023-10-26 PROCEDURE — 83036 HEMOGLOBIN GLYCOSYLATED A1C: CPT

## 2023-10-26 PROCEDURE — 80061 LIPID PANEL: CPT

## 2023-10-26 PROCEDURE — 36415 COLL VENOUS BLD VENIPUNCTURE: CPT

## 2023-10-26 PROCEDURE — G0439 PPPS, SUBSEQ VISIT: HCPCS | Performed by: INTERNAL MEDICINE

## 2023-10-26 RX ORDER — PREDNISONE 50 MG/1
TABLET ORAL
COMMUNITY
Start: 2023-10-11

## 2023-10-26 RX ORDER — ERGOCALCIFEROL 1.25 MG/1
CAPSULE ORAL
COMMUNITY
Start: 2023-05-05

## 2023-10-27 DIAGNOSIS — E78.5 HYPERLIPIDEMIA ASSOCIATED WITH TYPE 2 DIABETES MELLITUS: Primary | ICD-10-CM

## 2023-10-27 DIAGNOSIS — E11.69 HYPERLIPIDEMIA ASSOCIATED WITH TYPE 2 DIABETES MELLITUS: Primary | ICD-10-CM

## 2023-10-27 LAB
CHOLEST SERPL-MCNC: 165 MG/DL (ref ?–200)
HDLC SERPL-MCNC: 37 MG/DL (ref 40–59)
LDLC SERPL CALC-MCNC: 94 MG/DL (ref ?–100)
NONHDLC SERPL-MCNC: 128 MG/DL (ref ?–130)
TRIGL SERPL-MCNC: 201 MG/DL (ref 30–149)
VIT D+METAB SERPL-MCNC: 60.5 NG/ML (ref 30–100)
VLDLC SERPL CALC-MCNC: 33 MG/DL (ref 0–30)

## 2023-11-07 ENCOUNTER — OFFICE VISIT (OUTPATIENT)
Dept: PODIATRY CLINIC | Facility: CLINIC | Age: 73
End: 2023-11-07

## 2023-11-07 DIAGNOSIS — I73.9 PERIPHERAL ARTERIAL DISEASE (HCC): ICD-10-CM

## 2023-11-07 DIAGNOSIS — M20.42 HAMMER TOES OF BOTH FEET: ICD-10-CM

## 2023-11-07 DIAGNOSIS — E11.9 TYPE 2 DIABETES MELLITUS WITHOUT COMPLICATION, WITHOUT LONG-TERM CURRENT USE OF INSULIN (HCC): Primary | ICD-10-CM

## 2023-11-07 DIAGNOSIS — M20.41 HAMMER TOES OF BOTH FEET: ICD-10-CM

## 2023-11-07 DIAGNOSIS — B35.1 ONYCHOMYCOSIS: ICD-10-CM

## 2023-11-07 PROCEDURE — 99213 OFFICE O/P EST LOW 20 MIN: CPT | Performed by: STUDENT IN AN ORGANIZED HEALTH CARE EDUCATION/TRAINING PROGRAM

## 2023-11-07 NOTE — PROGRESS NOTES
[address] [facility] Podiatry  Progress Note    Clara Koehler is a 68year old male. Chief Complaint   Patient presents with    Diabetic Foot Care     Nail care and foot check. No FBS taken todat. A1C 6.1 on 10/26. HPI:     Patient is a pleasant 19-year-old diabetic male who presents to clinic for diabetic foot exam.  He has elongated and thickened nails he has difficulty trimming on his own. He denies any open sores or other concerns. His blood sugars were not checked this morning. His last hemoglobin A1c was 6.1% on 10/26/2023. No other complaints are mentioned. Past medical history, medications, and allergies reviewed. Allergies: Radiology contrast iodinated dyes, Iodine i 131 tositumomab, Other, and Iodine (topical)   Current Outpatient Medications   Medication Sig Dispense Refill    ergocalciferol 1.25 MG (30899 UT) Oral Cap Once a week      predniSONE 50 MG Oral Tab TAKE 1 TABLET 13 HOURS, 7 HOURS AND 1 HOUR PRIOR TO THE MRI SCAN. TAKE BENADRYL 50MG WITH LAST DOSE OF PREDNISONE      fluocinonide 0.05 % External Cream Apply 1 Application topically 2 (two) times daily. 30 g 0    escitalopram 20 MG Oral Tab Take 1 tablet (20 mg total) by mouth daily. 90 tablet 0    glimepiride 2 MG Oral Tab Take 1 tablet (2 mg total) by mouth before breakfast. 90 tablet 0    METFORMIN  MG Oral Tablet 24 Hr Take 2 tablets by mouth once daily 180 tablet 0    lisinopril 10 MG Oral Tab Take 1 tablet (10 mg total) by mouth every morning. 90 tablet 3    Glucose Blood (ACCU-CHEK GUIDE) In Vitro Strip 1 each by Finger stick route daily. May dispense any glucometer, strips and lancets that insurance will cover DX: E11.9. Test once daily 100 strip 1    Blood Glucose Monitoring Suppl (ACCU-CHEK GUIDE) w/Device Does not apply Kit 1 each daily. Test once daily.  DX. E11.9 May dispense any glucometer, strips and lancets that insurance will cover 1 kit 0    rosuvastatin (CRESTOR) 20 MG Oral Tab Take 1 tablet (20 mg total) by mouth daily. 90 tablet 3    tamsulosin 0.4 MG Oral Cap Take 1 capsule (0.4 mg total) by mouth daily. 90 capsule 4    aspirin 81 MG Oral Tab EC Take 1 tablet (81 mg total) by mouth daily. 30 tablet 0      Past Medical History:   Diagnosis Date    Anesthesia complication     slow to awaken    Brain mass 10/19/2012    Essential hypertension     Exposure to radiation 3/2015    24 radiation of prostate prior to seed implantation    High blood pressure     High cholesterol     Lung mass 10/19/2012    Obesity 10/19/2012    Obstructive sleep apnea (adult) (pediatric) 6/29/2012    COURT (obstructive sleep apnea) 07/08/18 [place] Night    AHI 76 RDI 85 REM AHI 51 Supine AHI 77 non-supine AHI 60 Sao2 Gregg 78%     COURT on CPAP 10/19/2012    6-20 [facility]    Prostate cancer ([facility] [address] 2013    s/p SEEDS 5/2/13    Sleep apnea     Tobacco dependence 10/19/2012      Past Surgical History:   Procedure Laterality Date    COLONOSCOPY  1/9/13= Diverticulosis, Hemorrhoids    Repeat 2023    OTHER      anterior cervical neck sx  - metal    OTHER SURGICAL HISTORY  2/20/13    PNBx - Dr. Cash Mathias  5/2/13    [address] [facility] CATARACT EXTRACAP,INSERT LENS  12/26/2012    Procedure: LEFT PHACOEMULSIFICATION OF CATARACT WITH INTRAOCULAR LENS IMPLANT [id number];  Surgeon: Willy Byrne MD;  Location: [address] CATARACT EXTRACAP,INSERT LENS  11/28/2012    Procedure: RIGHT PHACOEMULSIFICATION OF CATARACT WITH INTRAOCULAR LENS IMPLANT [id number];  Surgeon:  Willy Byrne MD;  Location: [facility]    TONSILLECTOMY        Family History   Problem Relation Age of Onset    Cancer Sister         breast cancer    Cancer Sister     Cancer Sister     Cancer Sister       Social History     Socioeconomic History    Marital status:    Tobacco Use    Smoking status: Former     Packs/day: 0.50     Years: 30.00     Additional pack years: 0.00     Total pack years: 15.00 Types: Cigarettes     Quit date: 2014     Years since quittin.5    Smokeless tobacco: Never   Vaping Use    Vaping Use: Never used   Substance and Sexual Activity    Alcohol use: Not Currently     Alcohol/week: 2.0 standard drinks of alcohol     Types: 2 Cans of beer per week    Drug use: No   Other Topics Concern    Caffeine Concern Yes     Comment: coffee 1-2 daily    Exercise No           REVIEW OF SYSTEMS:     Today reviewed systems as documented below  GENERAL HEALTH: feels well otherwise  SKIN: denies any unusual skin lesions or rashes  RESPIRATORY: denies shortness of breath with exertion  CARDIOVASCULAR: denies chest pain on exertion  GI: denies abdominal pain and denies heartburn  NEURO: denies headaches  MUSCULO: denies arthritis, back pain      EXAM:   There were no vitals taken for this visit. GENERAL: well developed, well nourished, in no apparent distress  EXTREMITIES:   1. Integument: Normal skin temperature and turgor. Nails x10 are elongated and thickened and with subungual debris. 2. Vascular: Pedal pulses are lightly palpable. 3. Musculoskeletal: All muscle groups are graded 5 out of 5 in the foot and ankle. Flexion contracture of lesser digits. 4. Neurological: Normal sharp dull sensation; reflexes normal.          ASSESSMENT AND PLAN:   Diagnoses and all orders for this visit:    Type 2 diabetes mellitus without complication, without long-term current use of insulin (HCC)    Peripheral arterial disease (HCC)    Onychomycosis    Hammer toes of both feet          Plan:     -Patient examined, chart history reviewed. -Discussed importance of proper pedal hygiene, regular foot checks, and tight glucose control.  -Sharply debrided nails x10 with a sterile nail nipper achieving a 20% reduction in thickness and length, without incident.  Nails further smoothed with dremel.  -Ambulate with supportive shoes and inserts and avoid walking barefoot.   -Educated patient on acute signs of infection advised patient to seek immediate medical attention if symptoms arise. The patient indicates understanding of these issues and agrees to the plan. RTC 3 months.     Desmond Davalos DPM

## 2023-11-15 DIAGNOSIS — E11.9 TYPE 2 DIABETES MELLITUS WITHOUT COMPLICATION, WITHOUT LONG-TERM CURRENT USE OF INSULIN (HCC): ICD-10-CM

## 2023-11-15 RX ORDER — METFORMIN HYDROCHLORIDE 750 MG/1
1500 TABLET, EXTENDED RELEASE ORAL DAILY
Qty: 180 TABLET | Refills: 0 | Status: SHIPPED | OUTPATIENT
Start: 2023-11-15

## 2023-11-15 NOTE — TELEPHONE ENCOUNTER
Medication requested: Metformin ER   Dose: 750mg    Is patient requesting 30 or 90 day supply:  90    Pharmacy name/location:  [address] [facility] [address] [phone], [address]:  10/26/2023    Is the patient due for appointment: no (if so, please schedule)    Additional notes:    Future Appointments   Date Time Provider Graciela Crocker   1/10/2024  1:15 PM Yoel Tamayo Si [id number] [facility]   4/26/2024  9:45 AM Josette Rebolledo MD [facility] [address]

## 2023-12-04 ENCOUNTER — PATIENT MESSAGE (OUTPATIENT)
Dept: INTERNAL MEDICINE CLINIC | Facility: CLINIC | Age: 73
End: 2023-12-04

## 2023-12-04 DIAGNOSIS — I25.10 CORONARY ARTERY CALCIFICATION SEEN ON CT SCAN: ICD-10-CM

## 2023-12-04 DIAGNOSIS — F34.1 DYSTHYMIA: ICD-10-CM

## 2023-12-04 DIAGNOSIS — E11.9 TYPE 2 DIABETES MELLITUS WITHOUT COMPLICATION, WITHOUT LONG-TERM CURRENT USE OF INSULIN (HCC): ICD-10-CM

## 2023-12-04 DIAGNOSIS — Z86.73 HISTORY OF STROKE: ICD-10-CM

## 2023-12-04 RX ORDER — ESCITALOPRAM OXALATE 20 MG/1
20 TABLET ORAL DAILY
Qty: 90 TABLET | Refills: 0 | Status: SHIPPED | OUTPATIENT
Start: 2023-12-04

## 2023-12-04 RX ORDER — ESCITALOPRAM OXALATE 20 MG/1
20 TABLET ORAL DAILY
Qty: 90 TABLET | Refills: 0 | OUTPATIENT
Start: 2023-12-04

## 2023-12-04 RX ORDER — GLIMEPIRIDE 2 MG/1
2 TABLET ORAL
Qty: 90 TABLET | Refills: 0 | Status: SHIPPED | OUTPATIENT
Start: 2023-12-04

## 2023-12-04 RX ORDER — TAMSULOSIN HYDROCHLORIDE 0.4 MG/1
0.4 CAPSULE ORAL DAILY
Qty: 90 CAPSULE | Refills: 0 | Status: SHIPPED | OUTPATIENT
Start: 2023-12-04

## 2023-12-04 NOTE — TELEPHONE ENCOUNTER
Last time medication was refilled 9/22/23  Quantity and # of refills 90 w 0  Last OV 10/26/23  Next OV 4/26/24  Passed protocol, Rx sent.

## 2023-12-04 NOTE — TELEPHONE ENCOUNTER
Last time medication was refilled 9/22/23  Quantity and # of refills 90 w 0  Last OV 10/26/23  Next OV 4/26/24    Sent to CHANDLER Gibbs for approval.

## 2023-12-04 NOTE — TELEPHONE ENCOUNTER
From: Mary Ann Mora  To: Clau Navarro  Sent: 12/4/2023 2:29 PM CST  Subject: Cholesterol     How does my cholesterol look.  I had blood drawn last visit  Thanks for your help

## 2023-12-04 NOTE — TELEPHONE ENCOUNTER
glimepiride 2 MG Oral Tab   90 Day    escitalopram 20 MG Oral Tab   90 Day    tamsulosin 0.4 MG Oral Cap   90 Day  - looks like the last Rx was written by a different provider. Patient insisted that we did fill it before. I did not see it on the list. Please review and advise how to proceed for this 3rd Rx.     [facility] [address] [place] [address],[address], [facility] Neil Kelly [address] [facility] [phone], [phone] [[id number]]     Last OV 10/26/23    Next OV 4/26/24

## 2023-12-05 RX ORDER — ROSUVASTATIN CALCIUM 20 MG/1
20 TABLET, COATED ORAL DAILY
Qty: 90 TABLET | Refills: 3 | Status: SHIPPED | OUTPATIENT
Start: 2023-12-05

## 2023-12-06 DIAGNOSIS — E11.9 TYPE 2 DIABETES MELLITUS WITHOUT COMPLICATION, WITHOUT LONG-TERM CURRENT USE OF INSULIN (HCC): ICD-10-CM

## 2023-12-06 RX ORDER — GLIMEPIRIDE 2 MG/1
2 TABLET ORAL
Qty: 90 TABLET | Refills: 0 | OUTPATIENT
Start: 2023-12-06

## 2023-12-06 NOTE — TELEPHONE ENCOUNTER
Last time medication was refilled 12/04/2023  Quantity and # of refills 90 w/ 0  Last OV 10/26/2023  Next OV   Future Appointments   Date Time Provider Graciela Crocker   1/10/2024  1:15 PM Sury Lebron [facility]   4/26/2024  9:45 AM Salty Davila MD [facility] [address]       Duplicate request

## 2023-12-14 ENCOUNTER — TELEPHONE (OUTPATIENT)
Dept: INTERNAL MEDICINE CLINIC | Facility: CLINIC | Age: 73
End: 2023-12-14

## 2023-12-17 NOTE — TELEPHONE ENCOUNTER
Spoke with patient's wife Alex Gonzalez and relayed message below. Patient's wife understood, but stated patient has a lot of medical condition and she has other questions and would like to speak to Araceli Lanza directly. Speaking Coherently

## 2024-02-14 DIAGNOSIS — I15.2 HYPERTENSION ASSOCIATED WITH DIABETES  (HCC): ICD-10-CM

## 2024-02-14 DIAGNOSIS — Z86.73 HISTORY OF STROKE: ICD-10-CM

## 2024-02-14 DIAGNOSIS — E11.59 HYPERTENSION ASSOCIATED WITH DIABETES  (HCC): ICD-10-CM

## 2024-02-14 RX ORDER — LISINOPRIL 10 MG/1
10 TABLET ORAL EVERY MORNING
Qty: 90 TABLET | Refills: 0 | Status: SHIPPED | OUTPATIENT
Start: 2024-02-14

## 2024-02-14 NOTE — TELEPHONE ENCOUNTER
Last time medication was refilled 02/13/2023  Quantity and # of refills 90 w 3  Last OV 10/26/2023  Next OV   Future Appointments   Date Time Provider Department Center   4/26/2024  9:45 AM Jabier Genao MD [facility] [address]       Failed protocol.     Sent to Dr. Genao for approval

## 2024-02-26 DIAGNOSIS — Z86.73 HISTORY OF STROKE: ICD-10-CM

## 2024-02-26 DIAGNOSIS — E11.9 TYPE 2 DIABETES MELLITUS WITHOUT COMPLICATION, WITHOUT LONG-TERM CURRENT USE OF INSULIN (HCC): ICD-10-CM

## 2024-02-26 DIAGNOSIS — I25.10 CORONARY ARTERY CALCIFICATION SEEN ON CT SCAN: ICD-10-CM

## 2024-02-26 RX ORDER — ROSUVASTATIN CALCIUM 20 MG/1
20 TABLET, COATED ORAL DAILY
Qty: 90 TABLET | Refills: 3 | Status: SHIPPED | OUTPATIENT
Start: 2024-02-26

## 2024-02-26 RX ORDER — GLIMEPIRIDE 2 MG/1
2 TABLET ORAL
Qty: 90 TABLET | Refills: 0 | Status: SHIPPED | OUTPATIENT
Start: 2024-02-26

## 2024-02-26 RX ORDER — METFORMIN HYDROCHLORIDE 750 MG/1
1500 TABLET, EXTENDED RELEASE ORAL DAILY
Qty: 180 TABLET | Refills: 0 | Status: SHIPPED | OUTPATIENT
Start: 2024-02-26

## 2024-02-26 NOTE — TELEPHONE ENCOUNTER
Glimepiride 2 mg  Last time medication was refilled 12/4/23  Quantity and # of refills 90 w 0  Last OV 10/26/23  Next OV 4/26/24  Metformin er 750 mg  Last time medication was refilled 11/15/23  Quantity and # of refills 180 w 0  Last OV 10/26/23  Next OV 4/26/24  Rosuvastatin 20 mg  Last time medication was refilled 12/5/23  Quantity and # of refills 90 w 3  Last OV 10/26/23  Next OV 4/26/24  Failed protocol.   Sent to Dr. Genao for approval.

## 2024-02-26 NOTE — TELEPHONE ENCOUNTER
Medication requested: metFORMIN  MG Oral Tablet 24 Hr     glimepiride 2 MG Oral Tab     rosuvastatin (CRESTOR) 20 MG Oral Tab     Is patient requesting 30 or 90 day supply:  90    Pharmacy name/location:  [address] [phone], [phone] [[id number]]     LOV:  10/26/2023    Is the patient due for appointment: NO      Additional notes:  NO

## 2024-03-05 ENCOUNTER — TELEPHONE (OUTPATIENT)
Dept: INTERNAL MEDICINE CLINIC | Facility: CLINIC | Age: 74
End: 2024-03-05

## 2024-03-05 ENCOUNTER — LAB ENCOUNTER (OUTPATIENT)
Dept: LAB | Age: 74
End: 2024-03-05
Attending: INTERNAL MEDICINE
Payer: MEDICARE

## 2024-03-05 ENCOUNTER — OFFICE VISIT (OUTPATIENT)
Dept: INTERNAL MEDICINE CLINIC | Facility: CLINIC | Age: 74
End: 2024-03-05
Payer: MEDICARE

## 2024-03-05 VITALS
DIASTOLIC BLOOD PRESSURE: 82 MMHG | BODY MASS INDEX: 31.42 KG/M2 | TEMPERATURE: 98 F | HEART RATE: 68 BPM | HEIGHT: 72 IN | SYSTOLIC BLOOD PRESSURE: 130 MMHG | RESPIRATION RATE: 14 BRPM | WEIGHT: 232 LBS | OXYGEN SATURATION: 98 %

## 2024-03-05 DIAGNOSIS — E11.69 HYPERLIPIDEMIA ASSOCIATED WITH TYPE 2 DIABETES MELLITUS (HCC): ICD-10-CM

## 2024-03-05 DIAGNOSIS — E11.9 TYPE 2 DIABETES MELLITUS WITHOUT COMPLICATION, WITHOUT LONG-TERM CURRENT USE OF INSULIN (HCC): ICD-10-CM

## 2024-03-05 DIAGNOSIS — E11.8 DIABETES MELLITUS WITH COMPLICATION (HCC): ICD-10-CM

## 2024-03-05 DIAGNOSIS — R27.0 ATAXIA: Primary | ICD-10-CM

## 2024-03-05 DIAGNOSIS — R29.898 WEAKNESS OF BOTH LOWER EXTREMITIES: ICD-10-CM

## 2024-03-05 DIAGNOSIS — M54.16 LUMBAR RADICULOPATHY: ICD-10-CM

## 2024-03-05 DIAGNOSIS — Z85.46 HISTORY OF PROSTATE CANCER: ICD-10-CM

## 2024-03-05 DIAGNOSIS — Z86.73 HISTORY OF STROKE: ICD-10-CM

## 2024-03-05 DIAGNOSIS — E78.5 HYPERLIPIDEMIA ASSOCIATED WITH TYPE 2 DIABETES MELLITUS (HCC): ICD-10-CM

## 2024-03-05 LAB
BASOPHILS # BLD AUTO: 0.05 X10(3) UL (ref 0–0.2)
BASOPHILS NFR BLD AUTO: 0.7 %
CREAT UR-SCNC: 202 MG/DL
EOSINOPHIL # BLD AUTO: 0.19 X10(3) UL (ref 0–0.7)
EOSINOPHIL NFR BLD AUTO: 2.7 %
ERYTHROCYTE [DISTWIDTH] IN BLOOD BY AUTOMATED COUNT: 13.2 %
EST. AVERAGE GLUCOSE BLD GHB EST-MCNC: 126 MG/DL (ref 68–126)
HBA1C MFR BLD: 6 % (ref ?–5.7)
HCT VFR BLD AUTO: 42 %
HGB BLD-MCNC: 13.7 G/DL
IMM GRANULOCYTES # BLD AUTO: 0.02 X10(3) UL (ref 0–1)
IMM GRANULOCYTES NFR BLD: 0.3 %
LYMPHOCYTES # BLD AUTO: 0.99 X10(3) UL (ref 1–4)
LYMPHOCYTES NFR BLD AUTO: 13.8 %
MCH RBC QN AUTO: 29.5 PG (ref 26–34)
MCHC RBC AUTO-ENTMCNC: 32.6 G/DL (ref 31–37)
MCV RBC AUTO: 90.5 FL
MICROALBUMIN UR-MCNC: 71.7 MG/DL
MICROALBUMIN/CREAT 24H UR-RTO: 355 UG/MG (ref ?–30)
MONOCYTES # BLD AUTO: 0.77 X10(3) UL (ref 0.1–1)
MONOCYTES NFR BLD AUTO: 10.8 %
NEUTROPHILS # BLD AUTO: 5.13 X10 (3) UL (ref 1.5–7.7)
NEUTROPHILS # BLD AUTO: 5.13 X10(3) UL (ref 1.5–7.7)
NEUTROPHILS NFR BLD AUTO: 71.7 %
PLATELET # BLD AUTO: 216 10(3)UL (ref 150–450)
RBC # BLD AUTO: 4.64 X10(6)UL
WBC # BLD AUTO: 7.2 X10(3) UL (ref 4–11)

## 2024-03-05 PROCEDURE — 82570 ASSAY OF URINE CREATININE: CPT

## 2024-03-05 PROCEDURE — 99214 OFFICE O/P EST MOD 30 MIN: CPT | Performed by: INTERNAL MEDICINE

## 2024-03-05 PROCEDURE — 82043 UR ALBUMIN QUANTITATIVE: CPT

## 2024-03-05 PROCEDURE — 83036 HEMOGLOBIN GLYCOSYLATED A1C: CPT

## 2024-03-05 PROCEDURE — 80061 LIPID PANEL: CPT

## 2024-03-05 PROCEDURE — 80053 COMPREHEN METABOLIC PANEL: CPT

## 2024-03-05 PROCEDURE — 36415 COLL VENOUS BLD VENIPUNCTURE: CPT

## 2024-03-05 PROCEDURE — 85025 COMPLETE CBC W/AUTO DIFF WBC: CPT

## 2024-03-05 NOTE — TELEPHONE ENCOUNTER
Pt saw Dr. Genao today, wife pulled me aside to speak to me privately. Pts wife forgot to mention to Dr. Genao during the visit that she is requesting a Home Health Nurse, she feels very overwhelm and requesting help in the care for her .    Routed to Triage.

## 2024-03-06 ENCOUNTER — TELEPHONE (OUTPATIENT)
Dept: INTERNAL MEDICINE CLINIC | Facility: CLINIC | Age: 74
End: 2024-03-06

## 2024-03-06 DIAGNOSIS — T50.8X5D ALLERGIC REACTION TO CONTRAST MATERIAL, SUBSEQUENT ENCOUNTER: Primary | ICD-10-CM

## 2024-03-06 LAB
ALBUMIN SERPL-MCNC: 3.9 G/DL (ref 3.4–5)
ALBUMIN/GLOB SERPL: 1.2 {RATIO} (ref 1–2)
ALP LIVER SERPL-CCNC: 73 U/L
ALT SERPL-CCNC: 22 U/L
ANION GAP SERPL CALC-SCNC: 3 MMOL/L (ref 0–18)
AST SERPL-CCNC: 16 U/L (ref 15–37)
BILIRUB SERPL-MCNC: 0.6 MG/DL (ref 0.1–2)
BUN BLD-MCNC: 15 MG/DL (ref 9–23)
CALCIUM BLD-MCNC: 9.4 MG/DL (ref 8.5–10.1)
CHLORIDE SERPL-SCNC: 108 MMOL/L (ref 98–112)
CHOLEST SERPL-MCNC: 86 MG/DL (ref ?–200)
CO2 SERPL-SCNC: 25 MMOL/L (ref 21–32)
CREAT BLD-MCNC: 0.96 MG/DL
EGFRCR SERPLBLD CKD-EPI 2021: 83 ML/MIN/1.73M2 (ref 60–?)
FASTING PATIENT LIPID ANSWER: YES
FASTING STATUS PATIENT QL REPORTED: YES
GLOBULIN PLAS-MCNC: 3.3 G/DL (ref 2.8–4.4)
GLUCOSE BLD-MCNC: 112 MG/DL (ref 70–99)
HDLC SERPL-MCNC: 35 MG/DL (ref 40–59)
LDLC SERPL CALC-MCNC: 31 MG/DL (ref ?–100)
NONHDLC SERPL-MCNC: 51 MG/DL (ref ?–130)
OSMOLALITY SERPL CALC.SUM OF ELEC: 284 MOSM/KG (ref 275–295)
POTASSIUM SERPL-SCNC: 4.6 MMOL/L (ref 3.5–5.1)
PROT SERPL-MCNC: 7.2 G/DL (ref 6.4–8.2)
SODIUM SERPL-SCNC: 136 MMOL/L (ref 136–145)
TRIGL SERPL-MCNC: 104 MG/DL (ref 30–149)
VLDLC SERPL CALC-MCNC: 14 MG/DL (ref 0–30)

## 2024-03-06 RX ORDER — DIPHENHYDRAMINE HCL 25 MG
TABLET ORAL
Qty: 2 TABLET | Refills: 0 | Status: SHIPPED | OUTPATIENT
Start: 2024-03-06

## 2024-03-06 RX ORDER — PREDNISONE 10 MG/1
TABLET ORAL
Qty: 15 TABLET | Refills: 0 | Status: SHIPPED | OUTPATIENT
Start: 2024-03-06 | End: 2024-04-04

## 2024-03-06 NOTE — TELEPHONE ENCOUNTER
Spoke with Spouse, stated she spoke with RN in radiology and was informed to contact PCP to have protocol ordered for contrast   Orders placed  instructions reviewed for administration of the medication, spouse v/u

## 2024-03-06 NOTE — PROGRESS NOTES
Subjective:   Patient ID: Silvio Arellano is a 74 year old male.    Difficulty Walking  This is a new problem. The current episode started more than 1 month ago. The problem occurs constantly. The problem has been gradually worsening. Associated symptoms include weakness (b/l legs). Pertinent negatives include no abdominal pain, anorexia, change in bowel habit, chest pain, congestion, coughing, fatigue, fever, neck pain, urinary symptoms, vertigo or vomiting. The symptoms are aggravated by walking. He has tried rest and walking for the symptoms. The treatment provided no relief.     Reports accu checks have at goal  History/Other:   Review of Systems   Constitutional:  Negative for fatigue and fever.   HENT:  Negative for congestion.    Respiratory:  Negative for cough.    Cardiovascular:  Negative for chest pain.   Gastrointestinal:  Negative for abdominal pain, anorexia, change in bowel habit and vomiting.   Musculoskeletal:  Negative for neck pain.   Neurological:  Positive for weakness (b/l legs). Negative for vertigo.   All other systems reviewed and are negative.    Current Outpatient Medications   Medication Sig Dispense Refill    Mirabegron ER 25 MG Oral Tablet 24 Hr 1 tablet (25 mg total).      glimepiride 2 MG Oral Tab Take 1 tablet (2 mg total) by mouth before breakfast. 90 tablet 0    rosuvastatin (CRESTOR) 20 MG Oral Tab Take 1 tablet (20 mg total) by mouth daily. 90 tablet 3    metFORMIN  MG Oral Tablet 24 Hr Take 2 tablets (1,500 mg total) by mouth daily. 180 tablet 0    lisinopril 10 MG Oral Tab Take 1 tablet (10 mg total) by mouth every morning. 90 tablet 0    escitalopram 20 MG Oral Tab Take 1 tablet (20 mg total) by mouth daily. 90 tablet 0    ergocalciferol 1.25 MG (55109 UT) Oral Cap Once a week      predniSONE 50 MG Oral Tab TAKE 1 TABLET 13 HOURS, 7 HOURS AND 1 HOUR PRIOR TO THE MRI SCAN. TAKE BENADRYL 50MG WITH LAST DOSE OF PREDNISONE      fluocinonide 0.05 % External Cream Apply 1  Application topically 2 (two) times daily. 30 g 0    Glucose Blood (ACCU-CHEK GUIDE) In Vitro Strip 1 each by Finger stick route daily. May dispense any glucometer, strips and lancets that insurance will cover DX: E11.9. Test once daily 100 strip 1    Blood Glucose Monitoring Suppl (ACCU-CHEK GUIDE) w/Device Does not apply Kit 1 each daily. Test once daily. DX. E11.9 May dispense any glucometer, strips and lancets that insurance will cover 1 kit 0    aspirin 81 MG Oral Tab EC Take 1 tablet (81 mg total) by mouth daily. 30 tablet 0     Allergies:  Allergies   Allergen Reactions    Radiology Contrast Iodinated Dyes HIVES and RASH    Iodine I 131 Tositumomab RASH     MRI contrast or IV dye     Other RASH     Iodine Dye Injectable    Iodine (Topical) RASH     Cerner Allergy Text Annotation: iodine; pt allergic to CT Contrast. Not topic betadine solution  Iodine Dye Injectable  Iodine Dye Injectable         Objective:   Physical Exam  Vitals and nursing note reviewed.   Constitutional:       General: He is not in acute distress.     Appearance: Normal appearance. He is not ill-appearing.   Cardiovascular:      Rate and Rhythm: Normal rate and regular rhythm.      Pulses: Normal pulses.      Heart sounds: Normal heart sounds.   Pulmonary:      Effort: Pulmonary effort is normal.      Breath sounds: Normal breath sounds.   Musculoskeletal:         General: No swelling.   Neurological:      Mental Status: He is alert.      Cranial Nerves: Cranial nerves 2-12 are intact.      Sensory: Sensation is intact.      Deep Tendon Reflexes:      Reflex Scores:       Patellar reflexes are 1+ on the right side and 1+ on the left side.     Comments: Bilateral barefoot skin diabetic exam is normal, visualized feet and the appearance is normal.  Bilateral monofilament/sensation of both feet is normal.  Pulsation pedal pulse exam of both lower legs/feet is normal as well.               Assessment & Plan:   1. Ataxia    2. Diabetes mellitus  with complication (HCC)    3. Lumbar radiculopathy      - check mri lumbar for evla  - cont dm2 med care. Check kabs as ordered  Orders Placed This Encounter   Procedures    Comp Metabolic Panel (14)    CBC With Differential With Platelet    Hemoglobin A1C    Microalb/Creat Ratio, Random Urine    Lipid Panel       Meds This Visit:  Requested Prescriptions      No prescriptions requested or ordered in this encounter       Imaging & Referrals:  OP REFERRAL TO [facility]  MRI SPINE LUMBAR (CPT=72148)  MRI BRAIN (W+WO) (CPT=70553)

## 2024-03-06 NOTE — TELEPHONE ENCOUNTER
Orders placed via [place]  Sent to Lg PULIDO for electronic signature    Shayla Villalba   [facility]  18m ago  Thank you for the order once signed and dated we will process.

## 2024-03-07 NOTE — TELEPHONE ENCOUNTER
Order tracker has been shared with the patient  An SMS with a link to the order tracker has been sent to [phone]

## 2024-03-08 NOTE — TELEPHONE ENCOUNTER
For Humaira Villalba  [facility]  3/8 ? 11:31AM  @Tonia Allison, patient has a WC order and cannot get both rollator and WC. Thank you     PT notified, via [employer]    For Wheelchair  [facility]  3/8 ? 12:53PM  updated expected delivery date to 03/11/24  [facility]  3/8 ? 12:53PM  Ready for Delivery: Order for Standard Transport Chair, 19'' + 1 other item is ready for delivery  Shayla Villalba   [facility]  3/8 ? 11:31AM  I called and scheduled this delivery. Thank you!

## 2024-03-12 NOTE — TELEPHONE ENCOUNTER
From DME company. Patient has a WC order and cannot get both rollator and WC. Thank you     Will need to reach out to pt/spouse to discuss which one they would like.

## 2024-03-12 NOTE — TELEPHONE ENCOUNTER
Spoke to spouse, Denise. Patient received WC two days ago and will keep wheel chair over rollator. Denise appreciative of call.

## 2024-03-19 ENCOUNTER — TELEPHONE (OUTPATIENT)
Dept: INTERNAL MEDICINE CLINIC | Facility: CLINIC | Age: 74
End: 2024-03-19

## 2024-03-19 NOTE — TELEPHONE ENCOUNTER
Spoke with spouse Denise, pt had MRI Brain today, pt has shunt that needs to be re calibrated after MRI which pt's spouse forgot to schedule so canceled appt and will reschedule once she has more information where to schedule re calibration

## 2024-03-19 NOTE — TELEPHONE ENCOUNTER
Spouse calling back to discuss with clinical if having  admitted so all testing can be done would be advisable?   Concerned because patient is getting weaker and weaker.     Please discuss with patient's spouse.

## 2024-03-19 NOTE — TELEPHONE ENCOUNTER
Wife called back  Unable to get thru for shunt recalibrating appt with [facility]  Wife is afraid pt is getting weaker and weaker, may be his stoke is worsening if he is having one  Per Dr. Genao take pt to ER for further eval   Wife agreed

## 2024-03-19 NOTE — TELEPHONE ENCOUNTER
PT is having an MRI  today and spouse states that he needs to go to a different doctor to get his shot re-calibrate to avoid headaches

## 2024-03-27 ENCOUNTER — TELEPHONE (OUTPATIENT)
Dept: INTERNAL MEDICINE CLINIC | Facility: CLINIC | Age: 74
End: 2024-03-27

## 2024-03-27 NOTE — TELEPHONE ENCOUNTER
Per patient's spouse, calling requesting for a referral or order of the recalibration of the shun, MRI and spinal \"thing\". Per patient, this was not able to be done at pcp's office. Please review and call patient back.

## 2024-03-27 NOTE — TELEPHONE ENCOUNTER
Spoke with pt's spouse, MRI will be done at [facility] to provide fax number, we can fax orders or they can review via care everywhere   Spouse will call back with Fax number

## 2024-04-01 ENCOUNTER — TELEPHONE (OUTPATIENT)
Dept: INTERNAL MEDICINE CLINIC | Facility: CLINIC | Age: 74
End: 2024-04-01

## 2024-04-01 NOTE — TELEPHONE ENCOUNTER
Requesting order for MRI of Brain and Spine   [facility] ([place])   Fx:[phone]    Original orders were for [facility]  MRI BRAIN (W+WO) (CPT=70553) (Order #[id number]) on 3/19/24     MRI SPINE LUMBAR (CPT=72148) (Order #[id number]) on 3/5/24       When it was sent to [facility] they were not able to re-calibrate the shunt     Patient needs to have his Shunt recalibrate, with Neurology   At [facility] after the MRI/Imaging.     Last OV 3/5/24      Please call when these orders have been faxed successfully please call patients spouse to advise so they can make their cooresponding appointments.

## 2024-04-04 DIAGNOSIS — T50.8X5D ALLERGIC REACTION TO CONTRAST MATERIAL, SUBSEQUENT ENCOUNTER: ICD-10-CM

## 2024-04-04 RX ORDER — PREDNISONE 10 MG/1
TABLET ORAL
Qty: 15 TABLET | Refills: 0 | Status: SHIPPED | OUTPATIENT
Start: 2024-04-04

## 2024-04-04 NOTE — TELEPHONE ENCOUNTER
Medication requested: predniSONE 10 MG Oral Tab     Is patient requesting 30 or 90 day supply:  15 tablets     Pharmacy name/location:  [address] [phone], [phone] [[id number]]     LOV:  03/05/2024    Is the patient due for appointment: NO    Additional notes: new MRI visit is for tomorrow

## 2024-04-08 ENCOUNTER — TELEPHONE (OUTPATIENT)
Dept: INTERNAL MEDICINE CLINIC | Facility: CLINIC | Age: 74
End: 2024-04-08

## 2024-04-08 DIAGNOSIS — M48.061 LUMBAR FORAMINAL STENOSIS: Primary | ICD-10-CM

## 2024-04-08 DIAGNOSIS — R27.0 ATAXIA: ICD-10-CM

## 2024-04-08 NOTE — TELEPHONE ENCOUNTER
Spoke with spouse she was inquiring if anything was seen on the MRI of the brain that would be affected pts ability to walk.  Spouse was informed no.  She was also inquiring if there was evidence of mini strokes on the brain MRI.  She was informed no.  Also reviewed results of MRI lumbar spine.  Iesha ARTEAGA reviewed and pt does have moderate to severe left foraminal narrowing impingement upon L5 nerve root.  Recommendations: Referral to pain management or consult w/neurosurgery.  Spouse elected consult with neurosurgery.  Referral placed and information sent via [employer].

## 2024-04-08 NOTE — TELEPHONE ENCOUNTER
Recent MRI brain w + w/out contrast reviewed by CHANDLER Julian.  No new abnormal findings. Left detailed VM with provider comments. Results viewable through CE.

## 2024-04-09 ENCOUNTER — OFFICE VISIT (OUTPATIENT)
Dept: PHYSICAL THERAPY | Facility: HOSPITAL | Age: 74
End: 2024-04-09
Attending: INTERNAL MEDICINE
Payer: MEDICARE

## 2024-04-09 DIAGNOSIS — R27.0 ATAXIA: Primary | ICD-10-CM

## 2024-04-09 DIAGNOSIS — M54.16 LUMBAR RADICULOPATHY: ICD-10-CM

## 2024-04-09 PROCEDURE — 97163 PT EVAL HIGH COMPLEX 45 MIN: CPT

## 2024-04-09 PROCEDURE — 97110 THERAPEUTIC EXERCISES: CPT

## 2024-04-15 ENCOUNTER — TELEPHONE (OUTPATIENT)
Dept: PHYSICAL THERAPY | Facility: HOSPITAL | Age: 74
End: 2024-04-15

## 2024-04-16 ENCOUNTER — APPOINTMENT (OUTPATIENT)
Dept: PHYSICAL THERAPY | Facility: HOSPITAL | Age: 74
End: 2024-04-16
Attending: INTERNAL MEDICINE
Payer: MEDICARE

## 2024-04-17 ENCOUNTER — APPOINTMENT (OUTPATIENT)
Dept: PHYSICAL THERAPY | Facility: HOSPITAL | Age: 74
End: 2024-04-17
Attending: INTERNAL MEDICINE
Payer: MEDICARE

## 2024-04-21 NOTE — PROGRESS NOTES
NEUROLOGICAL EVALUATION:     Diagnosis:   Ataxia,lumbar radiculopathy      Referring Provider: Jabier Genao  Date of Evaluation:    4/9/2024    Precautions:  Cancer, Fall Risk Next MD visit:   none scheduled  Date of Surgery: n/a     PATIENT SUMMARY   Silvio Arellano is a 74 year old male who presents to therapy today with complaints of severe deconditioning, weakness, impaired gait with h/o of 3 recent falls and back pain with B radiculopathy. Wife present and states with his h/o CVA,CA, and DM his strength and functional mobility has been declining over the last 5 years. Presents in wheel chair. Does have walker he uses inside home. States no pain currently, more weakness than anything in his legs. States wife does help him with his ADLs. States max standing 5 minutes. Stairs has 1 step which is a challenge to negotiate. Is in the movement class for parkinson's at [facility] 2x/wk. Imaging of spine: mod to severe L foraminal narrowing, impingement L5 nerve root; L4-5 and L5-S1 degenerative changes.   Pt describes pain level current 0/10, at best 0/10, at worst 8/10.   Current functional limitations include needing assistance for ADLs, walker/wheel chair for ambulation, difficulty with stairs, unable to stand/walk > 5 minutes.     Silvio describes prior level of function over 5 years functional mobility declining. Pt goals include improve strength and ability to walk.  Past medical history was reviewed with Silvio. Significant findings include  has a past medical history of Anesthesia complication, Brain mass (10/19/2012), Essential hypertension, Exposure to radiation (3/2015), High blood pressure, High cholesterol, Lung mass (10/19/2012), Obesity (10/19/2012), Obstructive sleep apnea (adult) (pediatric) (6/29/2012), COURT (obstructive sleep apnea) (07/08/18 Split Night), COURT on CPAP (10/19/2012), Prostate cancer (HCC) (2013), Sleep apnea, and Tobacco dependence (10/19/2012).    Pt denies diplopia, dysarthria,  dysphasia, dizziness, drop attacks, bowel/bladder changes, saddle anesthesia, and OLEGARIO LE N/T.    ASSESSMENT  Silvio presents to physical therapy evaluation with primary c/o impaired gait and B LE weakness. The results of the objective tests and measures show high fall risk: 5 time sit-stand 16 seconds, tug 33 seconds, LAGOS 30/56, impaired gait, impaired standing and walking tolerance, core and B LE weakness, impaired B LE flexibility and painful and limited lumbar ROM.  Functional deficits include but are not limited to impaired standing tolerance, walking tolerance, requires min A for ADLs, difficulty performing sit-stand, and changing positions.  Signs and symptoms are consistent with diagnosis of ataxia and lumbar radiculopathy. Pt and PT discussed evaluation findings, pathology, POC and HEP.  Pt voiced understanding and performs HEP correctly without reported pain. Skilled Physical Therapy is medically necessary to address the above impairments and reach functional goals.     OBJECTIVE:   Observation/Posture: presents in W/C. Forward flexed, rounded shoulders.   Sensation: Grossly intact to light touch OLEGARIO UE/LE except decreased sensation to light touch B LEs  Coordination: slowed coordination L > R LEs  Finger to Nose: WNL  Heel-to-Shin: slow speed and decreased accuracy  Pronation/Supination: WNL  Toe Tap: slow speed    Flexibility:  Hip Flexor: R major, L major  Hamstrings: R -60; L -65  Piriformis: R major; L major  Quads: R major; L major  Gastroc-soleus: R major; L major    Deep Tendon Reflexes: WNL     Tone: WNL     OLEGARIO AROM and Strength WNL except below bilateral  (* denotes performed with pain)  Core: upper abs 2-/5, lower abs 2-/5, back ext 3/5  Hip AROM MMT (-/5)     Flexion 90 3+     Extension 0 3     Abduction 30 3+     External Rot 15 3   Knee AROM MMT (-/5)     Flexion 110 3+     Extension -5 3+   Foot / Ankle AROM MMT (-/5)     DF 5 3+     PF 30 3-       Mobility / Transfers Level of Assistance    Bed Mobility Min A   Supine --> Sit Min A   Sit --> Supine Min A   Sit --> Stand CGA   Stand --> Sit CGA   Chair --> Chair Min A     Postural Control:  Mariscal Balance: 30/56   Fall Risk: yes  4-Stage Balance Test:   - Feet together: 10 sec   - Modified Tandem:  0 sec   - Tandem Stance: 0 sec Fall Risk: yes   - SLS: R 0 sec, L 0 sec Fall Risk: yes    Functional Mobility:  5 times sit<>stand: 16 sec   Fall Risk: yes  Gait: pt ambulates on level ground with assistive device of RW, antalgia, decreased step length L, decreased stance phase L, decreased hip/knee flex or ext L> R, shuffle, decreased foot clearance L > R, decreased arm swing, stooped posture/forward lean, difficulty turning, trendelenburg/waddle, and path deviation with visual scanning.   Timed Up and Go (AD, time): 33 sec  Fall Risk: yes    Today’s Treatment and Response:   Pt education was provided on exam findings, treatment diagnosis, treatment plan, expectations, and prognosis. Pt was also provided recommendations for activity modifications, possible soreness after evaluation, modalities as needed [ice/heat], postural corrections, ergonomics, pain science education , importance of remaining active, and strategies to reduce fall risk at home.  Patient was instructed in and issued a HEP for: sit-stand, getting up and walking inside home with RW every 45 minutes    Charges: PT Eval High Complexity, TE      Total Timed Treatment:  10min     Total Treatment Time: 45 min     Based on clinical rationale and outcome measures, this evaluation involved High Complexity decision making due to 3+ personal factors/comorbidities, 4+ body structures involved/activity limitations, and evolving symptoms including changing pain levels.  PLAN OF CARE:    Goals: (to be met in 12 visits)    1. Patient to improve SLS balance to 3 seconds to decrease fall risk and improve overall safety    2. Patient to improve mariscal balance score to 36/56 to improve overall safety and  decrease fall risk.    3. Patient to tolerate walking 10 with improved shana and mechanics and LRAD.    4. Patient to improve core strength by 1/2 grade to promote improved posture and static balance.    5. Patient to negotiate stairs with railing with step to pattern to get into home with more ease    6. Patient to be independent with HEP, fall prevention education, and improved posture and body mechanics.        Frequency / Duration: Patient will be seen for 2 x/week or a total of 12 visits over a 90 day period.  Treatment will include: Gait training, Neuromuscular Re-education, Self-Care Home Management, Therapeutic Activities, Therapeutic Exercise, and Home Exercise Program instruction    Education or treatment limitation: None  Rehab Potential:good    FES Score  Score: 90.63 % (4/9/2024 12:35 PM)    Score and level of concern: 58 - high concern (4/9/2024 12:35 PM)      Patient/Family/Caregiver was advised of these findings, precautions, and treatment options and has agreed to actively participate in planning and for this course of care.    Thank you for your referral. Please co-sign or sign and return this letter via fax as soon as possible to [phone]. If you have any questions, please contact me at Dept: [phone]    Sincerely,  Electronically signed by therapist: Emely Mcarthur PT  Physician's certification required: Yes  I certify the need for these services furnished under this plan of treatment and while under my care.    X___________________________________________________ Date____________________    Certification From: 4/9/2024  To:7/9/2024

## 2024-04-23 ENCOUNTER — OFFICE VISIT (OUTPATIENT)
Dept: PHYSICAL THERAPY | Facility: HOSPITAL | Age: 74
End: 2024-04-23
Attending: INTERNAL MEDICINE
Payer: MEDICARE

## 2024-04-23 ENCOUNTER — MED REC SCAN ONLY (OUTPATIENT)
Dept: INTERNAL MEDICINE CLINIC | Facility: CLINIC | Age: 74
End: 2024-04-23

## 2024-04-23 PROCEDURE — 97110 THERAPEUTIC EXERCISES: CPT

## 2024-04-23 PROCEDURE — 97112 NEUROMUSCULAR REEDUCATION: CPT

## 2024-04-23 NOTE — PROGRESS NOTES
Diagnosis:   Ataxia,lumbar radiculopathy       Referring Provider: Jabier Genao  Date of Evaluation:   4/9/24    Precautions:  Cancer, Fall Risk Next MD visit:   none scheduled  Date of Surgery: n/a   Insurance Primary/Secondary: MEDICARE / COMMERCIAL       # Auth Visits: 12   Total Timed Treatment: 45 min  Date POC Expires: 7/8/24   Total Treatment time: 45 min       Charges: TE 2(30) NREED (15)       Treatment Number: 2/12    Subjective: Pt. Reports just being tired today. Not getting up with his walking inside his home as much as he should have.     Pain: not being seen for pain     Objective/Goals: Refer to flow sheet. Emphasis on gait training, balance training and gentle exercises to promote improved mobility.     Treatment 2/12    TE  Nustep x 5 min  Standing SLR x 5 each  Seated march x 10  Seated knee ext x 10  Hip add with ball squeeze x 10  Bridge x 10  DKTC c SB x 10  LTR c SB x 10  Passive stretch B LE's by PT    NREED  A/P board x 10  Gait in // bars fwd/bwd/lat x 2 laps each  Airex  *step up x 10  *fwd lunge x 10  *lat lunge x 10  *toe/heel raises x 10  *mini squat x 10  Goals: (to be met in 12 visits)    1. Patient to improve SLS balance to 3 seconds to decrease fall risk and improve overall safety    2. Patient to improve mariscal balance score to 36/56 to improve overall safety and decrease fall risk.    3. Patient to tolerate walking 10 with improved shana and mechanics and LRAD.    4. Patient to improve core strength by 1/2 grade to promote improved posture and static balance.    5. Patient to negotiate stairs with railing with step to pattern to get into home with more ease    6. Patient to be independent with HEP, fall prevention education, and improved posture and body mechanics.    HEP: Access Code: [email or web address]  URL: [email or web address]  Date: 04/23/2024  Prepared by: Emely Cain-Yamila    Exercises  - Seated Long Arc Quad  - 1 x daily - 7 x weekly - 3 sets - 10 reps  -  Seated March  - 1 x daily - 7 x weekly - 3 sets - 10 reps  - Sit to Stand  - 1 x daily - 7 x weekly - 3 sets - 10 reps  - Seated Heel Raise  - 1 x daily - 7 x weekly - 3 sets - 10 reps  - Seated Toe Raise  - 1 x daily - 7 x weekly - 3 sets - 10 reps  - Seated Hip Adduction Isometrics with Ball  - 1 x daily - 7 x weekly - 3 sets - 10 reps  - Seated Lumbar Flexion Stretch  - 2 x daily - 7 x weekly - 1 sets - 10 reps - 5 hold  Education: fall prevention, importance of remaining active    Assessment: Tolerated session fair. Required 2 seated rest breaks. Gait belt with CGA to SBA for all standing exercises.      Plan: Assess response to last session. Progress gait, balance, and strengthening as tolerated.

## 2024-04-26 ENCOUNTER — OFFICE VISIT (OUTPATIENT)
Dept: INTERNAL MEDICINE CLINIC | Facility: CLINIC | Age: 74
End: 2024-04-26
Payer: MEDICARE

## 2024-04-26 VITALS
HEART RATE: 80 BPM | HEIGHT: 72 IN | SYSTOLIC BLOOD PRESSURE: 128 MMHG | BODY MASS INDEX: 31.42 KG/M2 | TEMPERATURE: 98 F | OXYGEN SATURATION: 98 % | DIASTOLIC BLOOD PRESSURE: 80 MMHG | RESPIRATION RATE: 14 BRPM | WEIGHT: 232 LBS

## 2024-04-26 DIAGNOSIS — R27.0 ATAXIA: Primary | ICD-10-CM

## 2024-04-26 PROCEDURE — 99214 OFFICE O/P EST MOD 30 MIN: CPT | Performed by: INTERNAL MEDICINE

## 2024-04-26 NOTE — PROGRESS NOTES
Subjective:   Patient ID: Silvio Arellano is a 74 year old male.    HPI  Follow up on ataxia  Not improved. Wife shows a video of pt's ataxia  MRI brain w/o acute findings  Uses walker  Fredy appt to see neuro next week  Feels weak.   Tires easily   Cmp, cbc are jelly  History/Other:   Review of Systems   All other systems reviewed and are negative.    Current Outpatient Medications   Medication Sig Dispense Refill    predniSONE 10 MG Oral Tab Take 50 mg thirteen hours prior then seven hours and one hour prior to administration of contrast 15 tablet 0    diphenhydrAMINE HCl (BENADRYL ALLERGY) 25 MG Oral Tab Take 50mg one hour prior to contrast administration 2 tablet 0    Mirabegron ER 25 MG Oral Tablet 24 Hr 1 tablet (25 mg total).      glimepiride 2 MG Oral Tab Take 1 tablet (2 mg total) by mouth before breakfast. 90 tablet 0    rosuvastatin (CRESTOR) 20 MG Oral Tab Take 1 tablet (20 mg total) by mouth daily. 90 tablet 3    metFORMIN  MG Oral Tablet 24 Hr Take 2 tablets (1,500 mg total) by mouth daily. 180 tablet 0    lisinopril 10 MG Oral Tab Take 1 tablet (10 mg total) by mouth every morning. 90 tablet 0    escitalopram 20 MG Oral Tab Take 1 tablet (20 mg total) by mouth daily. 90 tablet 0    ergocalciferol 1.25 MG (96993 UT) Oral Cap Once a week      fluocinonide 0.05 % External Cream Apply 1 Application topically 2 (two) times daily. 30 g 0    Glucose Blood (ACCU-CHEK GUIDE) In Vitro Strip 1 each by Finger stick route daily. May dispense any glucometer, strips and lancets that insurance will cover DX: E11.9. Test once daily 100 strip 1    Blood Glucose Monitoring Suppl (ACCU-CHEK GUIDE) w/Device Does not apply Kit 1 each daily. Test once daily. DX. E11.9 May dispense any glucometer, strips and lancets that insurance will cover 1 kit 0    aspirin 81 MG Oral Tab EC Take 1 tablet (81 mg total) by mouth daily. 30 tablet 0     Allergies:  Allergies   Allergen Reactions    Radiology Contrast Iodinated Dyes HIVES  and RASH    Iodine I 131 Tositumomab RASH     MRI contrast or IV dye     Other RASH     Iodine Dye Injectable    Iodine (Topical) RASH     Cerner Allergy Text Annotation: iodine; pt allergic to CT Contrast. Not topic betadine solution  Iodine Dye Injectable  Iodine Dye Injectable         Objective:   Physical Exam  Vitals and nursing note reviewed.   Constitutional:       Appearance: Normal appearance.   Cardiovascular:      Rate and Rhythm: Normal rate and regular rhythm.      Heart sounds: Normal heart sounds.   Pulmonary:      Breath sounds: Normal breath sounds.   Neurological:      Mental Status: He is alert and oriented to person, place, and time.      Motor: No weakness.      Coordination: Coordination normal.         Assessment & Plan:   1. Ataxia      - unclear etiology. NPH ? Await neuro input  No orders of the defined types were placed in this encounter.      Meds This Visit:  Requested Prescriptions      No prescriptions requested or ordered in this encounter       Imaging & Referrals:  None

## 2024-04-30 ENCOUNTER — OFFICE VISIT (OUTPATIENT)
Dept: PHYSICAL THERAPY | Facility: HOSPITAL | Age: 74
End: 2024-04-30
Attending: INTERNAL MEDICINE
Payer: MEDICARE

## 2024-04-30 PROCEDURE — 97112 NEUROMUSCULAR REEDUCATION: CPT

## 2024-04-30 PROCEDURE — 97110 THERAPEUTIC EXERCISES: CPT

## 2024-05-01 NOTE — PROGRESS NOTES
Diagnosis:   Ataxia,lumbar radiculopathy       Referring Provider: Jabier Genao  Date of Evaluation:   4/9/24    Precautions:  Cancer, Fall Risk Next MD visit:   none scheduled  Date of Surgery: n/a   Insurance Primary/Secondary: MEDICARE / COMMERCIAL       # Auth Visits: 12   Total Timed Treatment: 45 min  Date POC Expires: 7/8/24   Total Treatment time: 45 min       Charges: TE 2(30) NREED (15)       Treatment Number: 3/12    Subjective: Pt. Reports just being tired today. States he has been trying to get up more throughout the day to walk inside home. States not doing his other exercises as much as he should.    Pain: Pt. Has no complaints of pain.    Objective/Goals: Refer to flow sheet. Emphasis on gait training, balance training and gentle exercises to promote improved mobility.     Treatment 3/12    TE  Nustep x 5 min  Standing SLR x 10 each  Seated march x 10  Seated knee ext x 10  Hip add with ball squeeze x 10  Bridge c SB x 10  DKTC c SB x 10  LTR c SB x 10  Passive stretch B LE's by PT    NREED  A/P board x 10  Gait in // bars fwd/bwd/lat x 2 laps each  Airex  *step up x 10  *fwd lunge x 10  *lat lunge x 10  *toe/heel raises x 10  *mini squat x 10  *DLS balance x 10 seconds x 3  *DLS balance with head turns with UE support    Goals: (to be met in 12 visits)    1. Patient to improve SLS balance to 3 seconds to decrease fall risk and improve overall safety    2. Patient to improve mariscal balance score to 36/56 to improve overall safety and decrease fall risk.    3. Patient to tolerate walking 10 with improved shana and mechanics and LRAD.    4. Patient to improve core strength by 1/2 grade to promote improved posture and static balance.    5. Patient to negotiate stairs with railing with step to pattern to get into home with more ease    6. Patient to be independent with HEP, fall prevention education, and improved posture and body mechanics.    HEP: Access Code: [email or web address]  URL:  [email or web address]  Date: 04/23/2024  Prepared by: Emely Cain-Yamila    Exercises  - Seated Long Arc Quad  - 1 x daily - 7 x weekly - 3 sets - 10 reps  - Seated March  - 1 x daily - 7 x weekly - 3 sets - 10 reps  - Sit to Stand  - 1 x daily - 7 x weekly - 3 sets - 10 reps  - Seated Heel Raise  - 1 x daily - 7 x weekly - 3 sets - 10 reps  - Seated Toe Raise  - 1 x daily - 7 x weekly - 3 sets - 10 reps  - Seated Hip Adduction Isometrics with Ball  - 1 x daily - 7 x weekly - 3 sets - 10 reps  - Seated Lumbar Flexion Stretch  - 2 x daily - 7 x weekly - 1 sets - 10 reps - 5 hold  Education: fall prevention, importance of remaining active    Assessment: Tolerated session fair. Required 2 seated rest breaks. Gait belt with CGA to SBA for all standing exercises. Required B UE support for all standing exercises. Cues to clear feet with walking and airex exercises. Able to walk 50 feet with walking stick with SBA.       Plan: Assess response to last session. Progress gait, balance, and strengthening as tolerated.

## 2024-05-07 ENCOUNTER — OFFICE VISIT (OUTPATIENT)
Dept: PHYSICAL THERAPY | Facility: HOSPITAL | Age: 74
End: 2024-05-07
Attending: INTERNAL MEDICINE
Payer: MEDICARE

## 2024-05-07 PROCEDURE — 97112 NEUROMUSCULAR REEDUCATION: CPT

## 2024-05-07 PROCEDURE — 97110 THERAPEUTIC EXERCISES: CPT

## 2024-05-07 NOTE — PROGRESS NOTES
Diagnosis:   Ataxia,lumbar radiculopathy       Referring Provider: Jabier Genao  Date of Evaluation:   4/9/24    Precautions:  Cancer, Fall Risk Next MD visit:   none scheduled  Date of Surgery: n/a   Insurance Primary/Secondary: MEDICARE / COMMERCIAL       # Auth Visits: 12   Total Timed Treatment: 45 min  Date POC Expires: 7/8/24   Total Treatment time: 45 min       Charges: TE 2(30) NREED (15)       Treatment Number: 4/12    Subjective: Pt. States he does try to get up more at home. Is to follow up with [facility] per his neurologist. States shunt was cleared for reason his gait is becoming more ataxic.     Pain: Pt. Has no complaints of pain.    Objective/Goals: Refer to flow sheet. Emphasis on gait training, balance training and gentle exercises to promote improved mobility. CGA to SBA for gait this date. Pt. More unsteady on his feet. Stands listed to the left and unable to right self even with PT helping to position him correctly. With mirror feedback still stands listed to the L making him more unsteady when walking this date.     Treatment 4/12    TE  Nustep x 5 min  Standing SLR x 10 each  Seated march x 10  Seated knee ext x 10  Hip add with ball squeeze x 10  Bridge c SB x 10  DKTC c SB x 10  LTR c SB x 10  Passive stretch B LE's by PT    NREED  A/P board x 10  Gait in // bars fwd/bwd/lat x 2 laps each  Gait with walking stick with CGA x 50 feet.  Airex  *step up x 10  *fwd lunge x 10  *lat lunge x 10  *toe/heel raises x 10  *mini squat x 10  Robert tap up/over fwd x 10 each    Goals: (to be met in 12 visits)    1. Patient to improve SLS balance to 3 seconds to decrease fall risk and improve overall safety    2. Patient to improve mariscal balance score to 36/56 to improve overall safety and decrease fall risk.    3. Patient to tolerate walking 10 with improved shana and mechanics and LRAD.    4. Patient to improve core strength by 1/2 grade to promote improved posture and static  balance.    5. Patient to negotiate stairs with railing with step to pattern to get into home with more ease    6. Patient to be independent with HEP, fall prevention education, and improved posture and body mechanics.    HEP: Access Code: [email or web address]  URL: [email or web address]  Date: 04/23/2024  Prepared by: Emely Cain-Yamila    Exercises  - Seated Long Arc Quad  - 1 x daily - 7 x weekly - 3 sets - 10 reps  - Seated March  - 1 x daily - 7 x weekly - 3 sets - 10 reps  - Sit to Stand  - 1 x daily - 7 x weekly - 3 sets - 10 reps  - Seated Heel Raise  - 1 x daily - 7 x weekly - 3 sets - 10 reps  - Seated Toe Raise  - 1 x daily - 7 x weekly - 3 sets - 10 reps  - Seated Hip Adduction Isometrics with Ball  - 1 x daily - 7 x weekly - 3 sets - 10 reps  - Seated Lumbar Flexion Stretch  - 2 x daily - 7 x weekly - 1 sets - 10 reps - 5 hold  Education: fall prevention, importance of remaining active    Assessment: Tolerated session fair. Required 2 seated rest breaks. Gait belt with CGA to SBA for all standing exercises. Pt. More unsteady this date. Needed tactile and verbal cues to right self, stands listed to the L this date for all standing and walking exercises. Required B UE support for all standing exercises. Cues to clear feet with walking and airex exercises. Pt. Taking smaller, shuffling steps this date. With cues, able to improve step length slightly.     Plan: Assess response to last session. Progress gait, balance, and strengthening as tolerated.

## 2024-05-14 ENCOUNTER — APPOINTMENT (OUTPATIENT)
Dept: PHYSICAL THERAPY | Facility: HOSPITAL | Age: 74
End: 2024-05-14
Attending: INTERNAL MEDICINE
Payer: MEDICARE

## 2024-05-16 DIAGNOSIS — Z86.73 HISTORY OF STROKE: ICD-10-CM

## 2024-05-16 DIAGNOSIS — E11.59 HYPERTENSION ASSOCIATED WITH DIABETES (HCC): ICD-10-CM

## 2024-05-16 DIAGNOSIS — F34.1 DYSTHYMIA: ICD-10-CM

## 2024-05-16 DIAGNOSIS — E11.9 TYPE 2 DIABETES MELLITUS WITHOUT COMPLICATION, WITHOUT LONG-TERM CURRENT USE OF INSULIN (HCC): ICD-10-CM

## 2024-05-16 DIAGNOSIS — I15.2 HYPERTENSION ASSOCIATED WITH DIABETES (HCC): ICD-10-CM

## 2024-05-16 RX ORDER — METFORMIN HYDROCHLORIDE 750 MG/1
1500 TABLET, EXTENDED RELEASE ORAL DAILY
Qty: 180 TABLET | Refills: 0 | Status: SHIPPED | OUTPATIENT
Start: 2024-05-16

## 2024-05-16 RX ORDER — ESCITALOPRAM OXALATE 20 MG/1
20 TABLET ORAL DAILY
Qty: 90 TABLET | Refills: 0 | Status: SHIPPED | OUTPATIENT
Start: 2024-05-16

## 2024-05-16 RX ORDER — LISINOPRIL 10 MG/1
10 TABLET ORAL EVERY MORNING
Qty: 90 TABLET | Refills: 0 | Status: SHIPPED | OUTPATIENT
Start: 2024-05-16

## 2024-05-16 NOTE — TELEPHONE ENCOUNTER
escitalopram 20 MG Oral Tab   Last time medication was refilled 12/04/2023  Last OV 04/26/2024  Next OV due/scheduled   Future Appointments   Date Time Provider Department Center   5/21/2024 11:15 AM Emely Mcarthur, PT [facility]   5/28/2024 12:00 PM Emely Mcarthur, PT [facility]   6/4/2024 10:15 AM Jabier Genao MD [facility] [address] [address]     Sent to Dr. Genao for approval             lisinopril 10 MG Oral Tab   Last time medication was refilled 02/14/2024  Last OV 04/26/2024  Next OV due/scheduled   Future Appointments   Date Time Provider Department Center   5/21/2024 11:15 AM Emely Mcarthur, PT [facility]   5/28/2024 12:00 PM Emely Mcarthur, PT [facility]   6/4/2024 10:15 AM Jabier Genao MD [facility] [address]     Passed protocol, Rx sent.        metFORMIN  MG Oral Tablet 24 Hr     Last time medication was refilled 02/26/2024  Last OV 04/26/2024  Next OV due/scheduled   Future Appointments   Date Time Provider Department Center   5/21/2024 11:15 AM Emely Mcarthur, PT [facility]   5/28/2024 12:00 PM Emely Mcarthur, [facility]   6/4/2024 10:15 AM Jabier Genao MD [facility] [address] [address]       Passed protocol, Rx sent.

## 2024-05-16 NOTE — TELEPHONE ENCOUNTER
rosuvastatin (CRESTOR) 20 MG Oral Tab     glimepiride 2 MG Oral Tab     metFORMIN  MG Oral Tablet 24 Hr     escitalopram 20 MG Oral Tab     Is patient requesting  90 day supply    Pharmacy name/location:  [address] [phone], [phone] [[id number]]     LOV:  4/26/24    Is the patient due for appointment: 6/4/24    Additional notes:  n/a

## 2024-05-21 ENCOUNTER — APPOINTMENT (OUTPATIENT)
Dept: PHYSICAL THERAPY | Facility: HOSPITAL | Age: 74
End: 2024-05-21
Attending: INTERNAL MEDICINE
Payer: MEDICARE

## 2024-05-23 ENCOUNTER — TELEPHONE (OUTPATIENT)
Dept: PHYSICAL THERAPY | Facility: HOSPITAL | Age: 74
End: 2024-05-23

## 2024-05-28 ENCOUNTER — APPOINTMENT (OUTPATIENT)
Dept: PHYSICAL THERAPY | Facility: HOSPITAL | Age: 74
End: 2024-05-28
Attending: INTERNAL MEDICINE
Payer: MEDICARE

## 2024-05-30 LAB — AMB EXT HEMATOCRIT: 38.7

## 2024-06-04 ENCOUNTER — APPOINTMENT (OUTPATIENT)
Dept: PHYSICAL THERAPY | Facility: HOSPITAL | Age: 74
End: 2024-06-04
Attending: INTERNAL MEDICINE
Payer: MEDICARE

## 2024-06-05 LAB
AMB EXT BILIRUBIN, TOTAL: 0.6 MG/DL
AMB EXT BUN: 15 MG/DL
AMB EXT CALCIUM: 9.4
AMB EXT CHLORIDE: 105
AMB EXT CMP ALT: 15 U/L
AMB EXT CMP AST: 14 U/L
AMB EXT CREATININE: 0.88 MG/DL
AMB EXT EGFR NON-AA: 90
AMB EXT GLUCOSE: 98 MG/DL
AMB EXT HEMOGLOBIN: 12.7
AMB EXT MCV: 88.4
AMB EXT PLATELETS: 171
AMB EXT POSTASSIUM: 4.1 MMOL/L
AMB EXT SODIUM: 142 MMOL/L
AMB EXT TOTAL PROTEIN: 6.2
AMB EXT WBC: 5.8 X10(3)UL

## 2024-06-11 ENCOUNTER — APPOINTMENT (OUTPATIENT)
Dept: PHYSICAL THERAPY | Facility: HOSPITAL | Age: 74
End: 2024-06-11
Attending: INTERNAL MEDICINE
Payer: MEDICARE

## 2024-06-12 LAB
AMB EXT BILIRUBIN, TOTAL: 0.4 MG/DL
AMB EXT BUN: 17 MG/DL
AMB EXT CALCIUM: 8.9
AMB EXT CHLORIDE: 109
AMB EXT CMP ALT: 14 U/L
AMB EXT CMP AST: 11 U/L
AMB EXT CREATININE: 0.84 MG/DL
AMB EXT EGFR NON-AA: 90
AMB EXT GLUCOSE: 88 MG/DL
AMB EXT HEMATOCRIT: 37.5
AMB EXT HEMOGLOBIN: 12
AMB EXT MCV: 90.1
AMB EXT PLATELETS: 169
AMB EXT POSTASSIUM: 4 MMOL/L
AMB EXT SODIUM: 142 MMOL/L
AMB EXT TOTAL PROTEIN: 5.7
AMB EXT WBC: 7.2 X10(3)UL

## 2024-06-14 ENCOUNTER — TELEPHONE (OUTPATIENT)
Dept: INTERNAL MEDICINE CLINIC | Facility: CLINIC | Age: 74
End: 2024-06-14

## 2024-06-18 ENCOUNTER — APPOINTMENT (OUTPATIENT)
Dept: PHYSICAL THERAPY | Facility: HOSPITAL | Age: 74
End: 2024-06-18
Attending: INTERNAL MEDICINE
Payer: MEDICARE

## 2024-06-24 ENCOUNTER — LAB ENCOUNTER (OUTPATIENT)
Dept: LAB | Age: 74
End: 2024-06-24
Attending: INTERNAL MEDICINE

## 2024-06-24 ENCOUNTER — OFFICE VISIT (OUTPATIENT)
Dept: INTERNAL MEDICINE CLINIC | Facility: CLINIC | Age: 74
End: 2024-06-24

## 2024-06-24 VITALS
OXYGEN SATURATION: 96 % | HEIGHT: 72 IN | WEIGHT: 232 LBS | TEMPERATURE: 97 F | RESPIRATION RATE: 14 BRPM | BODY MASS INDEX: 31.42 KG/M2 | DIASTOLIC BLOOD PRESSURE: 68 MMHG | HEART RATE: 96 BPM | SYSTOLIC BLOOD PRESSURE: 136 MMHG

## 2024-06-24 DIAGNOSIS — E11.8 DIABETES MELLITUS WITH COMPLICATION (HCC): Primary | ICD-10-CM

## 2024-06-24 DIAGNOSIS — G21.4 VASCULAR PARKINSONISM (HCC): ICD-10-CM

## 2024-06-24 DIAGNOSIS — Z12.11 COLON CANCER SCREENING: ICD-10-CM

## 2024-06-24 DIAGNOSIS — I10 ESSENTIAL HYPERTENSION: ICD-10-CM

## 2024-06-24 DIAGNOSIS — E11.8 DIABETES MELLITUS WITH COMPLICATION (HCC): ICD-10-CM

## 2024-06-24 LAB
ALBUMIN SERPL-MCNC: 3.8 G/DL (ref 3.4–5)
ALBUMIN/GLOB SERPL: 1.1 {RATIO} (ref 1–2)
ALP LIVER SERPL-CCNC: 85 U/L
ALT SERPL-CCNC: 16 U/L
ANION GAP SERPL CALC-SCNC: 6 MMOL/L (ref 0–18)
AST SERPL-CCNC: 13 U/L (ref 15–37)
BILIRUB SERPL-MCNC: 0.4 MG/DL (ref 0.1–2)
BUN BLD-MCNC: 24 MG/DL (ref 9–23)
CALCIUM BLD-MCNC: 9.8 MG/DL (ref 8.5–10.1)
CHLORIDE SERPL-SCNC: 109 MMOL/L (ref 98–112)
CHOLEST SERPL-MCNC: 96 MG/DL (ref ?–200)
CO2 SERPL-SCNC: 28 MMOL/L (ref 21–32)
CREAT BLD-MCNC: 1.07 MG/DL
EGFRCR SERPLBLD CKD-EPI 2021: 73 ML/MIN/1.73M2 (ref 60–?)
EST. AVERAGE GLUCOSE BLD GHB EST-MCNC: 137 MG/DL (ref 68–126)
FASTING PATIENT LIPID ANSWER: NO
FASTING STATUS PATIENT QL REPORTED: NO
GLOBULIN PLAS-MCNC: 3.4 G/DL (ref 2.8–4.4)
GLUCOSE BLD-MCNC: 58 MG/DL (ref 70–99)
HBA1C MFR BLD: 6.4 % (ref ?–5.7)
HDLC SERPL-MCNC: 42 MG/DL (ref 40–59)
LDLC SERPL CALC-MCNC: 19 MG/DL (ref ?–100)
NONHDLC SERPL-MCNC: 54 MG/DL (ref ?–130)
OSMOLALITY SERPL CALC.SUM OF ELEC: 298 MOSM/KG (ref 275–295)
POTASSIUM SERPL-SCNC: 4.6 MMOL/L (ref 3.5–5.1)
PROT SERPL-MCNC: 7.2 G/DL (ref 6.4–8.2)
SODIUM SERPL-SCNC: 143 MMOL/L (ref 136–145)
TRIGL SERPL-MCNC: 230 MG/DL (ref 30–149)
VLDLC SERPL CALC-MCNC: 29 MG/DL (ref 0–30)

## 2024-06-24 PROCEDURE — 83036 HEMOGLOBIN GLYCOSYLATED A1C: CPT

## 2024-06-24 PROCEDURE — 99214 OFFICE O/P EST MOD 30 MIN: CPT | Performed by: INTERNAL MEDICINE

## 2024-06-24 PROCEDURE — 80061 LIPID PANEL: CPT

## 2024-06-24 PROCEDURE — 36415 COLL VENOUS BLD VENIPUNCTURE: CPT

## 2024-06-24 PROCEDURE — 80053 COMPREHEN METABOLIC PANEL: CPT

## 2024-06-24 RX ORDER — LISINOPRIL 20 MG/1
20 TABLET ORAL DAILY
COMMUNITY
Start: 2024-06-11

## 2024-06-24 RX ORDER — TAMSULOSIN HYDROCHLORIDE 0.4 MG/1
0.4 CAPSULE ORAL NIGHTLY
COMMUNITY
Start: 2024-06-10

## 2024-06-24 NOTE — PROGRESS NOTES
Subjective:   Patient ID: Silvio Arellano is a 74 year old male.    HPI  HPI:   Silvio Arellano is a 74 year old male who presents for recheck of his diabetes and HTN. Patient’s FBS have been stable..  Pt has been checking his feet on a regular basis. Pt denies any tingling of the feet. Pt denies any issues with depression. Pt complains of gait abnormality. Has seen neuro and dx'd with vascular PD. Undergoing PT/rehab.    Wt Readings from Last 6 Encounters:   06/24/24 232 lb (105.2 kg)   04/26/24 232 lb (105.2 kg)   03/05/24 232 lb (105.2 kg)   10/26/23 234 lb (106.1 kg)   01/09/23 226 lb (102.5 kg)   10/20/22 234 lb (106.1 kg)     Body mass index is 31.46 kg/m².     Lab Results   Component Value Date    A1C 6.0 (H) 03/05/2024    A1C 6.1 (H) 10/26/2023    A1C 7.1 (H) 01/09/2023     Lab Results   Component Value Date    CHOLEST 86 03/05/2024    CHOLEST 165 10/26/2023    CHOLEST 193 10/20/2022     Lab Results   Component Value Date    HDL 35 (L) 03/05/2024    HDL 37 (L) 10/26/2023    HDL 26 (L) 10/20/2022     Lab Results   Component Value Date    LDL 31 03/05/2024    LDL 94 10/26/2023    LDL 82 10/20/2022     Lab Results   Component Value Date    TRIG 104 03/05/2024    TRIG 201 (H) 10/26/2023    TRIG 525 (H) 10/20/2022    TRIGLY 240 (H) 01/12/2016    TRIGLY 184 (H) 12/23/2013    TRIGLY 156 (H) 01/29/2013     Lab Results   Component Value Date    AST 11 06/12/2024    AST 14 06/05/2024    AST 16 03/05/2024     Lab Results   Component Value Date    ALT 14 06/12/2024    ALT 15 06/05/2024    ALT 22 03/05/2024     Malb/Cre Calc Ratio   Date Value Ref Range Status   10/12/2020 243.2 (H) 0.0 - 29.0 ug/mg Final     Malb/Cre Calc   Date Value Ref Range Status   03/05/2024 355.0 (H) <=30.0 ug/mg Final     Comment:     <30 ug/mg creatinine       Normal     ug/mg creatinine   Microalbuminuria   >300 ug/mg creatinine      Albuminuria       10/20/2022 154.3 (H) <=30.0 ug/mg Final     Comment:     <30 ug/mg creatinine       Normal      ug/mg creatinine   Microalbuminuria   >300 ug/mg creatinine      Albuminuria           Current Outpatient Medications   Medication Sig Dispense Refill    tamsulosin 0.4 MG Oral Cap Take 1 capsule (0.4 mg total) by mouth nightly. TAKE AT BEDTIME      carbidopa-levodopa  MG Oral Tab Take 1 tablet by mouth 3 (three) times daily before meals.      lisinopril 20 MG Oral Tab Take 1 tablet (20 mg total) by mouth daily.      metFORMIN  MG Oral Tablet 24 Hr Take 2 tablets (1,500 mg total) by mouth daily. 180 tablet 0    escitalopram 20 MG Oral Tab Take 1 tablet (20 mg total) by mouth daily. 90 tablet 0    predniSONE 10 MG Oral Tab Take 50 mg thirteen hours prior then seven hours and one hour prior to administration of contrast 15 tablet 0    diphenhydrAMINE HCl (BENADRYL ALLERGY) 25 MG Oral Tab Take 50mg one hour prior to contrast administration 2 tablet 0    Mirabegron ER 25 MG Oral Tablet 24 Hr 1 tablet (25 mg total).      glimepiride 2 MG Oral Tab Take 1 tablet (2 mg total) by mouth before breakfast. 90 tablet 0    rosuvastatin (CRESTOR) 20 MG Oral Tab Take 1 tablet (20 mg total) by mouth daily. 90 tablet 3    ergocalciferol 1.25 MG (58669 UT) Oral Cap Once a week      fluocinonide 0.05 % External Cream Apply 1 Application topically 2 (two) times daily. 30 g 0    Glucose Blood (ACCU-CHEK GUIDE) In Vitro Strip 1 each by Finger stick route daily. May dispense any glucometer, strips and lancets that insurance will cover DX: E11.9. Test once daily 100 strip 1    Blood Glucose Monitoring Suppl (ACCU-CHEK GUIDE) w/Device Does not apply Kit 1 each daily. Test once daily. DX. E11.9 May dispense any glucometer, strips and lancets that insurance will cover 1 kit 0    aspirin 81 MG Oral Tab EC Take 1 tablet (81 mg total) by mouth daily. 30 tablet 0    lisinopril 10 MG Oral Tab Take 1 tablet (10 mg total) by mouth every morning. (Patient not taking: Reported on 6/24/2024) 90 tablet 0      Past Medical History:     Anesthesia complication    slow to awaken    Brain mass    Essential hypertension    Exposure to radiation    24 radiation of prostate prior to seed implantation    High blood pressure    High cholesterol    Lung mass    Obesity    Obstructive sleep apnea (adult) (pediatric)    COURT (obstructive sleep apnea)    AHI 76 RDI 85 REM AHI 51 Supine AHI 77 non-supine AHI 60 Sao2 Gregg 78%     COURT on CPAP    6-20 [facility]    Prostate cancer (HCC)    s/p SEEDS 5/2/13    Sleep apnea    Tobacco dependence      Past Surgical History:   Procedure Laterality Date    Colonoscopy  1/9/13= Diverticulosis, Hemorrhoids    Repeat 2023    Other      anterior cervical neck sx  - metal    Other surgical history  2/20/13    PNBx - Dr. Berry    Other surgical history  5/2/13    Seeds Implant - [facility]    Remv cataract extracap,insert lens  12/26/2012    Procedure: LEFT PHACOEMULSIFICATION OF CATARACT WITH INTRAOCULAR LENS IMPLANT 73135;  Surgeon: Nitin Ang MD;  Location: [facility]    Remv cataract extracap,insert lens  11/28/2012    Procedure: RIGHT PHACOEMULSIFICATION OF CATARACT WITH INTRAOCULAR LENS IMPLANT 70336;  Surgeon: Nitin Ang MD;  Location: [facility]    Tonsillectomy        Social History:   Social History     Socioeconomic History    Marital status:    Tobacco Use    Smoking status: Former     Current packs/day: 0.00     Average packs/day: 0.5 packs/day for 30.0 years (15.0 ttl pk-yrs)     Types: Cigarettes     Start date: 5/8/1984     Quit date: 5/8/2014     Years since quitting: 10.1    Smokeless tobacco: Never   Vaping Use    Vaping status: Never Used   Substance and Sexual Activity    Alcohol use: Not Currently     Alcohol/week: 2.0 standard drinks of alcohol     Types: 2 Cans of beer per week    Drug use: No   Other Topics Concern    Caffeine Concern Yes     Comment: coffee 1-2 daily    Exercise No   Social History Narrative    : 1979     Children: 2    Exercise: walks, limited    Employment: [employer]    Caffeine intake: 2 coffee/d         Social Determinants of Health     Food Insecurity: Low Risk  (6/21/2024)    Received from [facility]    Food Insecurity     Have there been times that your food ran out, and you didn't have money to get more?: No     Are there times that you worry that this might happen?: No   Transportation Needs: Low Risk  (6/21/2024)    Received from [facility]    Transportation Needs     Do you have trouble getting transportation to medical appointments?: No   Housing Stability: Low Risk  (6/21/2024)    Received from [facility]    Housing Stability     Are you worried that your electric, gas, oil, or water might be shut off?: No     Are you concerned about having a safe and reliable place to live? : No     Exercise: minimal.  Diet: watches minimally     REVIEW OF SYSTEMS:   GENERAL HEALTH: feels well otherwise  SKIN: denies any unusual skin lesions or rashes  RESPIRATORY: denies shortness of breath with exertion  CARDIOVASCULAR: denies chest pain on exertion  GI: denies abdominal pain and denies heartburn  NEURO: denies headaches    EXAM:   /68   Pulse 96   Temp 97 °F (36.1 °C) (Temporal)   Resp 14   Ht 6' (1.829 m)   Wt 232 lb (105.2 kg)   SpO2 96%   BMI 31.46 kg/m²   GENERAL: well developed, well nourished,in no apparent distress  SKIN: no rashes,no suspicious lesions  NECK: supple,no adenopathy,no bruits  LUNGS: clear to auscultation  CARDIO: RRR without murmur  GI: good BS's,no masses, HSM or tenderness  EXTREMITIES: no cyanosis, clubbing or edema  NEURO: Bilateral barefoot skin diabetic exam is normal, visualized feet and the appearance is normal.  Bilateral monofilament/sensation of both feet is normal.  Pulsation pedal pulse exam of both lower legs/feet is normal as well.        ASSESSMENT AND PLAN:   Silvio Arellano is a 74 year old  male who presents for a recheck of his diabetes. Diabetic control is stable > HTN is controlled  PD- defer to neuro.  Recommendations are: continue present meds, check HgbA1C, fasting lipids and CMP, lose wgt with carbohydrate controlled diet and exercise, refer to Ophthamology, check feet daily.  The patient indicates understanding of these issues and agrees to the plan.  The patient is asked to return in 3m .    History/Other:   Review of Systems  Current Outpatient Medications   Medication Sig Dispense Refill    tamsulosin 0.4 MG Oral Cap Take 1 capsule (0.4 mg total) by mouth nightly. TAKE AT BEDTIME      carbidopa-levodopa  MG Oral Tab Take 1 tablet by mouth 3 (three) times daily before meals.      lisinopril 20 MG Oral Tab Take 1 tablet (20 mg total) by mouth daily.      metFORMIN  MG Oral Tablet 24 Hr Take 2 tablets (1,500 mg total) by mouth daily. 180 tablet 0    escitalopram 20 MG Oral Tab Take 1 tablet (20 mg total) by mouth daily. 90 tablet 0    predniSONE 10 MG Oral Tab Take 50 mg thirteen hours prior then seven hours and one hour prior to administration of contrast 15 tablet 0    diphenhydrAMINE HCl (BENADRYL ALLERGY) 25 MG Oral Tab Take 50mg one hour prior to contrast administration 2 tablet 0    Mirabegron ER 25 MG Oral Tablet 24 Hr 1 tablet (25 mg total).      glimepiride 2 MG Oral Tab Take 1 tablet (2 mg total) by mouth before breakfast. 90 tablet 0    rosuvastatin (CRESTOR) 20 MG Oral Tab Take 1 tablet (20 mg total) by mouth daily. 90 tablet 3    ergocalciferol 1.25 MG (81324 UT) Oral Cap Once a week      fluocinonide 0.05 % External Cream Apply 1 Application topically 2 (two) times daily. 30 g 0    Glucose Blood (ACCU-CHEK GUIDE) In Vitro Strip 1 each by Finger stick route daily. May dispense any glucometer, strips and lancets that insurance will cover DX: E11.9. Test once daily 100 strip 1    Blood Glucose Monitoring Suppl (ACCU-CHEK GUIDE) w/Device Does not apply Kit 1 each daily.  Test once daily. DX. E11.9 May dispense any glucometer, strips and lancets that insurance will cover 1 kit 0    aspirin 81 MG Oral Tab EC Take 1 tablet (81 mg total) by mouth daily. 30 tablet 0    lisinopril 10 MG Oral Tab Take 1 tablet (10 mg total) by mouth every morning. (Patient not taking: Reported on 6/24/2024) 90 tablet 0     Allergies:  Allergies   Allergen Reactions    Radiology Contrast Iodinated Dyes HIVES and RASH    Iodine I 131 Tositumomab RASH     MRI contrast or IV dye     Other RASH     Iodine Dye Injectable    Iodine (Topical) RASH     Cerner Allergy Text Annotation: iodine; pt allergic to CT Contrast. Not topic betadine solution  Iodine Dye Injectable  Iodine Dye Injectable         Objective:   Physical Exam    Assessment & Plan:   1. Diabetes mellitus with complication (HCC)    2. Essential hypertension    3. Vascular parkinsonism (HCC)    4. Colon cancer screening        Orders Placed This Encounter   Procedures    Comp Metabolic Panel (14)    Hemoglobin A1C    Lipid Panel    Microalb/Creat Ratio, Random Urine       Meds This Visit:  Requested Prescriptions      No prescriptions requested or ordered in this encounter       Imaging & Referrals:  [facility] - INTERNAL

## 2024-07-02 ENCOUNTER — APPOINTMENT (OUTPATIENT)
Dept: PHYSICAL THERAPY | Facility: HOSPITAL | Age: 74
End: 2024-07-02
Attending: INTERNAL MEDICINE
Payer: MEDICARE

## 2024-07-09 ENCOUNTER — APPOINTMENT (OUTPATIENT)
Dept: PHYSICAL THERAPY | Facility: HOSPITAL | Age: 74
End: 2024-07-09
Attending: INTERNAL MEDICINE
Payer: MEDICARE

## 2024-07-22 DIAGNOSIS — E11.9 TYPE 2 DIABETES MELLITUS WITHOUT COMPLICATION, WITHOUT LONG-TERM CURRENT USE OF INSULIN (HCC): ICD-10-CM

## 2024-07-22 RX ORDER — GLIMEPIRIDE 2 MG/1
2 TABLET ORAL
Qty: 90 TABLET | Refills: 0 | Status: SHIPPED | OUTPATIENT
Start: 2024-07-22

## 2024-07-22 RX ORDER — GLIMEPIRIDE 2 MG/1
2 TABLET ORAL
Qty: 90 TABLET | Refills: 0 | OUTPATIENT
Start: 2024-07-22

## 2024-07-22 NOTE — TELEPHONE ENCOUNTER
Medication requested: glimepiride 2 MG Oral Tab     Is patient requesting 30 or 90 day supply:  90    Pharmacy name/location:  [address] [phone], [phone] [[id number]]     LOV:  06/24/2024    Is the patient due for appointment: no  (if so, please schedule)    Additional notes:  Patient wife would like a ph call to speak about BP

## 2024-07-22 NOTE — TELEPHONE ENCOUNTER
Last time medication was refilled 02/26/2024  Last office visit  06/24/2024  Next office visit due/scheduled No future appointments.    Medication passed protocol, refill sent.

## 2024-08-01 ENCOUNTER — TELEPHONE (OUTPATIENT)
Dept: INTERNAL MEDICINE CLINIC | Facility: CLINIC | Age: 74
End: 2024-08-01

## 2024-08-01 NOTE — TELEPHONE ENCOUNTER
Left detailed message on VM to call back and report BP readings to review with providers in order to adjust medication

## 2024-08-01 NOTE — TELEPHONE ENCOUNTER
BP readings during OT visit today at [facility] available through [facility]: \"OBJECTIVE  Vitals assessed: 160/100, 163/93 with patient and spouse reporting he sees MD tomorrow and will continue to follow-up on BP patient asymptomatic \"

## 2024-08-01 NOTE — TELEPHONE ENCOUNTER
Patient while receiving treatment at [facility]   They have noticed his BP is all over the place and concerned wanting to advise the PCP    Spouse unable to book appointment at this time and has a lot of doctor appointments to juggle at this time.    Will try to make an appointment when they are able.    Please call to advise suggested options to lower and help BP become more consistent until they can be seen for an appt.

## 2024-09-26 DIAGNOSIS — F34.1 DYSTHYMIA: ICD-10-CM

## 2024-09-26 RX ORDER — ESCITALOPRAM OXALATE 20 MG/1
20 TABLET ORAL DAILY
Qty: 90 TABLET | Refills: 0 | Status: SHIPPED | OUTPATIENT
Start: 2024-09-26

## 2024-09-26 NOTE — TELEPHONE ENCOUNTER
Last time medication was refilled 05/16/2024  Last office visit  06/24/2024  Next office visit due/scheduled No future appointments.    Medication not on protocol.

## 2024-10-01 DIAGNOSIS — E11.9 TYPE 2 DIABETES MELLITUS WITHOUT COMPLICATION, WITHOUT LONG-TERM CURRENT USE OF INSULIN (HCC): ICD-10-CM

## 2024-10-01 RX ORDER — METFORMIN HYDROCHLORIDE 750 MG/1
1500 TABLET, EXTENDED RELEASE ORAL DAILY
Qty: 180 TABLET | Refills: 0 | Status: SHIPPED | OUTPATIENT
Start: 2024-10-01

## 2024-10-01 NOTE — TELEPHONE ENCOUNTER
Last time medication was refilled 05/16/2024  Last office visit  06/24/2024  Next office visit due/scheduled No future appointments.    Passed protocol, Medication sent.

## 2024-10-30 DIAGNOSIS — E11.9 TYPE 2 DIABETES MELLITUS WITHOUT COMPLICATION, WITHOUT LONG-TERM CURRENT USE OF INSULIN (HCC): ICD-10-CM

## 2024-10-30 RX ORDER — GLIMEPIRIDE 2 MG/1
2 TABLET ORAL
Qty: 90 TABLET | Refills: 0 | Status: SHIPPED | OUTPATIENT
Start: 2024-10-30

## 2024-10-30 NOTE — TELEPHONE ENCOUNTER
Last time medication was refilled 7/22/24  Last office visit  6/24/24  Next office visit due/scheduled No future appointments.  Passed protocol, Medication sent.

## 2024-11-26 RX ORDER — TAMSULOSIN HYDROCHLORIDE 0.4 MG/1
0.4 CAPSULE ORAL NIGHTLY
Qty: 30 CAPSULE | Refills: 0 | Status: SHIPPED | OUTPATIENT
Start: 2024-11-26

## 2024-11-26 NOTE — TELEPHONE ENCOUNTER
Last time medication was refilled 6/10/24  Last office visit  6/24/24  Next office visit due/scheduled   Future Appointments   Date Time Provider Department Center   12/16/2024  4:15 PM Jabier Genao MD [facility] [address]       Medication not on protocol.

## 2024-11-26 NOTE — TELEPHONE ENCOUNTER
tamsulosin 0.4 MG Oral Cap     Is patient requesting 90 day supply    Pharmacy name/location:  [address] [phone], [phone] [[id number]]     LOV:  6/24/24    Is the patient due for appointment: 12/16/24    Additional notes:  na

## 2024-12-15 PROBLEM — G20.C PARKINSONISM (HCC): Status: ACTIVE | Noted: 2024-12-15

## 2024-12-15 PROBLEM — G20.C PARKINSONISM (HCC): Status: RESOLVED | Noted: 2024-12-15 | Resolved: 2024-12-15

## 2024-12-16 ENCOUNTER — OFFICE VISIT (OUTPATIENT)
Dept: INTERNAL MEDICINE CLINIC | Facility: CLINIC | Age: 74
End: 2024-12-16
Payer: MEDICARE

## 2024-12-16 VITALS
HEART RATE: 81 BPM | DIASTOLIC BLOOD PRESSURE: 74 MMHG | WEIGHT: 250 LBS | SYSTOLIC BLOOD PRESSURE: 118 MMHG | BODY MASS INDEX: 33.86 KG/M2 | TEMPERATURE: 97 F | HEIGHT: 72 IN | RESPIRATION RATE: 16 BRPM | OXYGEN SATURATION: 98 %

## 2024-12-16 DIAGNOSIS — I71.43 INFRARENAL ABDOMINAL AORTIC ANEURYSM (AAA) WITHOUT RUPTURE (HCC): ICD-10-CM

## 2024-12-16 DIAGNOSIS — R94.31 PROLONGED Q-T INTERVAL ON ECG: ICD-10-CM

## 2024-12-16 DIAGNOSIS — E78.5 HYPERLIPIDEMIA ASSOCIATED WITH TYPE 2 DIABETES MELLITUS (HCC): ICD-10-CM

## 2024-12-16 DIAGNOSIS — I25.10 CORONARY ARTERY CALCIFICATION SEEN ON CT SCAN: ICD-10-CM

## 2024-12-16 DIAGNOSIS — Z00.00 ANNUAL PHYSICAL EXAM: Primary | ICD-10-CM

## 2024-12-16 DIAGNOSIS — I15.2 HYPERTENSION ASSOCIATED WITH DIABETES (HCC): ICD-10-CM

## 2024-12-16 DIAGNOSIS — H90.3 SENSORINEURAL HEARING LOSS (SNHL) OF BOTH EARS: ICD-10-CM

## 2024-12-16 DIAGNOSIS — I73.9 PERIPHERAL ARTERIAL DISEASE (HCC): ICD-10-CM

## 2024-12-16 DIAGNOSIS — G91.2 NPH (NORMAL PRESSURE HYDROCEPHALUS) (HCC): ICD-10-CM

## 2024-12-16 DIAGNOSIS — Z85.46 HISTORY OF PROSTATE CANCER: ICD-10-CM

## 2024-12-16 DIAGNOSIS — Z86.73 HISTORY OF STROKE: ICD-10-CM

## 2024-12-16 DIAGNOSIS — E11.69 HYPERLIPIDEMIA ASSOCIATED WITH TYPE 2 DIABETES MELLITUS (HCC): ICD-10-CM

## 2024-12-16 DIAGNOSIS — E11.59 HYPERTENSION ASSOCIATED WITH DIABETES (HCC): ICD-10-CM

## 2024-12-16 DIAGNOSIS — F33.1 MODERATE EPISODE OF RECURRENT MAJOR DEPRESSIVE DISORDER (HCC): ICD-10-CM

## 2024-12-16 DIAGNOSIS — G47.33 OBSTRUCTIVE SLEEP APNEA SYNDROME: ICD-10-CM

## 2024-12-16 DIAGNOSIS — G21.4 VASCULAR PARKINSONISM (HCC): ICD-10-CM

## 2024-12-16 DIAGNOSIS — E11.8 DIABETES MELLITUS WITH COMPLICATION (HCC): ICD-10-CM

## 2024-12-16 DIAGNOSIS — Z00.00 ENCOUNTER FOR ANNUAL HEALTH EXAMINATION: ICD-10-CM

## 2024-12-16 DIAGNOSIS — Q28.2 AVM (ARTERIOVENOUS MALFORMATION) BRAIN (HCC): ICD-10-CM

## 2024-12-16 DIAGNOSIS — I72.3 ILIAC ANEURYSM (HCC): ICD-10-CM

## 2024-12-16 NOTE — PROGRESS NOTES
Subjective:   Silvio Arellano is a 74 year old male who presents for a Medicare Subsequent Annual Wellness visit (Pt already had Initial Annual Wellness) and scheduled follow up of multiple significant but stable problems.   HPI  Manjinder feels fine today  He continues to follow with neurology at [facility] for PD and NPH  Sxs of PD are controlled on sinemet  No cp or sob    He denies cp or sob    PAST MEDICAL, SOCIAL, FAMILY HISTORIES REVIEWED WITH PT      History/Other:   Fall Risk Assessment:   He has been screened for Falls and is low risk.      Cognitive Assessment:   He had a completely normal cognitive assessment - see flowsheet entries       Functional Ability/Status:   Silvio Arellano has some abnormal functions as listed below:  He has Dressing and/or Bathing issues based on screening of functional status.  Difficulty dressing or bathing?: Yes  Bathing or Showering: Need some help  Dressing: Need some help  He has Driving difficulties based on screening of functional status. He has Meal Preparation difficulties based on screening of functional status.He has difficulties Shopping for Groceries based on screening of functional status. He has Walking problems based on screening of functional status. He has problems with Memory based on screening of functional status.       Depression Screening (PHQ):  PHQ-2 SCORE: 0  , done 12/16/2024            Advanced Directives:   He does have a Living Will but we do NOT have it on file in Epic.    He does have a POA but we do NOT have it on file in Epic.    Discussed Advance Care Planning with patient (and family/surrogate if present). Standard forms made available to patient in After Visit Summary.      Patient Active Problem List   Diagnosis    Hyperlipidemia associated with type 2 diabetes mellitus (HCC)    Peripheral arterial disease (HCC)    Obstructive sleep apnea syndrome    Contrast media allergy    Hypertension associated with diabetes (HCC)    Vascular parkinsonism (HCC)     Sensorineural hearing loss (SNHL) of both ears    Recurrent major depressive disorder (HCC)    NPH (normal pressure hydrocephalus) (HCC)- s/p  shunt. 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 Medina    Diabetes mellitus with complication (HCC)    Prolonged Q-T interval on ECG     Allergies:  He is allergic to radiology contrast iodinated dyes, iodine i 131 tositumomab, other, and iodine (topical).    Current Medications:  Outpatient Medications Marked as Taking for the 12/16/24 encounter (Office Visit) with Jabier Genao MD   Medication Sig    tamsulosin 0.4 MG Oral Cap Take 1 capsule (0.4 mg total) by mouth nightly. TAKE AT BEDTIME    GLIMEPIRIDE 2 MG Oral Tab TAKE 1 TABLET BY MOUTH BEFORE BREAKFAST    METFORMIN  MG Oral Tablet 24 Hr Take 2 tablets by mouth once daily    ESCITALOPRAM 20 MG Oral Tab Take 1 tablet by mouth once daily    carbidopa-levodopa  MG Oral Tab Take 1 tablet by mouth 3 (three) times daily before meals.    lisinopril 20 MG Oral Tab Take 1 tablet (20 mg total) by mouth daily.    predniSONE 10 MG Oral Tab Take 50 mg thirteen hours prior then seven hours and one hour prior to administration of contrast    diphenhydrAMINE HCl (BENADRYL ALLERGY) 25 MG Oral Tab Take 50mg one hour prior to contrast administration    Mirabegron ER 25 MG Oral Tablet 24 Hr 1 tablet (25 mg total).    rosuvastatin (CRESTOR) 20 MG Oral Tab Take 1 tablet (20 mg total) by mouth daily.    ergocalciferol 1.25 MG (88448 UT) Oral Cap Once a week    fluocinonide 0.05 % External Cream Apply 1 Application topically 2 (two) times daily.    Glucose Blood  (ACCU-CHEK GUIDE) In Vitro Strip 1 each by Finger stick route daily. May dispense any glucometer, strips and lancets that insurance will cover DX: E11.9. Test once daily    Blood Glucose Monitoring Suppl (ACCU-CHEK GUIDE) w/Device Does not apply Kit 1 each daily. Test once daily. DX. E11.9 May dispense any glucometer, strips and lancets that insurance will cover    aspirin 81 MG Oral Tab EC Take 1 tablet (81 mg total) by mouth daily.       Medical History:  He  has a past medical history of Anesthesia complication, Brain mass (10/19/2012), Essential hypertension, Exposure to radiation (3/2015), High blood pressure, High cholesterol, Lung mass (10/19/2012), Obesity (10/19/2012), Obstructive sleep apnea (adult) (pediatric) (6/29/2012), COURT (obstructive sleep apnea) (07/08/18 Split Night), COURT on CPAP (10/19/2012), Prostate cancer (HCC) (2013), Sleep apnea, and Tobacco dependence (10/19/2012).  Surgical History:  He  has a past surgical history that includes tonsillectomy; remv cataract extracap,insert lens (12/26/2012); colonoscopy (1/9/13= Diverticulosis, Hemorrhoids); remv cataract extracap,insert lens (11/28/2012); other surgical history (2/20/13); other surgical history (5/2/13); and other.   Family History:  His family history includes Cancer in his sister, sister, sister, and sister.  Social History:  He  reports that he quit smoking about 10 years ago. His smoking use included cigarettes. He started smoking about 40 years ago. He has a 15 pack-year smoking history. He has never used smokeless tobacco. He reports that he does not currently use alcohol after a past usage of about 2.0 standard drinks of alcohol per week. He reports that he does not use drugs.    Tobacco:  He smoked tobacco in the past but quit greater than 12 months ago.  Social History     Tobacco Use   Smoking Status Former    Current packs/day: 0.00    Average packs/day: 0.5 packs/day for 30.0 years (15.0 ttl pk-yrs)    Types: Cigarettes     Start date: 5/8/1984    Quit date: 5/8/2014    Years since quitting: 10.6   Smokeless Tobacco Never        CAGE Alcohol Screen:   CAGE screening score of 0 on 12/16/2024, showing low risk of alcohol abuse.      Patient Care Team:  Jabier Genao MD as PCP - General (Internal Medicine)  Lucien Young MD (NEUROSURGERY)  Loy Villalba MD (PULMONARY DISEASES)  Sheldon Valencia MD as Consulting Physician (NEUROLOGY)  Jani Romero MD as Consulting Physician (CARDIOLOGY)  Silvio Hernandez PA as Consulting Physician (Physician Assistant)  Emely Mcarthur PT as Physical Therapist    Review of Systems  A comprehensive 10 point review of systems was completed.     Pertinent positives and negatives noted in the HPI.      Objective:   Physical Exam  General: No acute distress. Alert and oriented x 3.  HEENT: Normocephalic atraumatic. Moist mucous membranes. EOM-I. PERRLA. Anicteric.  Neck: No lymphadenopathy.   Respiratory: Clear to auscultation bilaterally.   Cardiovascular: S1, S2. Regular rate and rhythm.   Abdomen: Soft,  no pain.  nontender, nondistended.  Positive bowel sounds. .  Neurologic: Bilateral barefoot skin diabetic exam is normal, visualized feet and the appearance is normal.  Bilateral monofilament/sensation of both feet is normal.  Pulsation pedal pulse exam of both lower legs/feet is normal as well..   Musculoskeletal: Moves all extremities.  Extremities: No edema or cyanosis.  Integument: No rashes or lesions.    Psychiatric: Appropriate mood and affect.    /74   Pulse 81   Temp 97.3 °F (36.3 °C)   Resp 16   Ht 6' (1.829 m)   Wt 250 lb (113.4 kg)   SpO2 98%   BMI 33.91 kg/m²  Estimated body mass index is 33.91 kg/m² as calculated from the following:    Height as of this encounter: 6' (1.829 m).    Weight as of this encounter: 250 lb (113.4 kg).    Medicare Hearing Assessment:   Hearing Screening    Screening Method: Whisper Test  Whisper Test Result: Pass                Assessment & Plan:   Silvio Arellano is a 74 year old male who presents for a Medicare Assessment.     1. Annual physical exam (Primary)  2. Diabetes mellitus with complication (HCC)  -     Comp Metabolic Panel (14); Future; Expected date: 12/16/2024  -     Hemoglobin A1C; Future; Expected date: 12/16/2024  -     Microalb/Creat Ratio, Random Urine; Future; Expected date: 12/16/2024  3. Hypertension associated with diabetes (HCC)  4. Infrarenal abdominal aortic aneurysm (AAA) without rupture (HCC)  Overview:  Formatting of this note might be different from the original.  Ectatic aorta with greatest diameter measurement 2.76 x 2.76  Formatting of this note might be different from the original.  Ectatic aorta with greatest diameter measurement 2.76 x 2.76  5. AVM (arteriovenous malformation) brain- s/p gamma knife 3/2018  6. Coronary artery calcification seen on CT scan  7. History of prostate cancer- [place] 4+4 2013 s/p radiation Dr. Medina  8. History of stroke-silent x2  9. Hyperlipidemia associated with type 2 diabetes mellitus (HCC)  10. Iliac aneurysm (HCC)  11. NPH (normal pressure hydrocephalus) ([facility])- s/p  shunt. Dr Carvalho neurosurgery  Overview:  Formatting of this note might be different from the original.  Added automatically from request for surgery [id number]  12. Obstructive sleep apnea syndrome  Overview:  Last Assessment & Plan:   Formatting of this note might be different from the original.  We will acquire the patient's sleep study from [facility] [place], [place] as it is currently not viewable via care everywhere.    Download reviewed and discussed with patient - Excellent use and control of obstructive events    Patient appreciates and acknowledges the benefit from CPAP on sleep quality and daytime function.    Patient commended on use and encouraged to continue    Setting change -none at this time  Formatting of this note might be different from the original.  Last Assessment & Plan:   We will acquire the  patient's sleep study from [facility] as it is currently not viewable via care everywhere.    Download reviewed and discussed with patient - Excellent use and control of obstructive events    Patient appreciates and acknowledges the benefit from CPAP on sleep quality and daytime function.    Patient commended on use and encouraged to continue    Setting change -none at this time  13. Peripheral arterial disease (HCC)  14. Prolonged Q-T interval on ECG  15. Moderate episode of recurrent major depressive disorder (HCC)  16. Sensorineural hearing loss (SNHL) of both ears  17. Vascular parkinsonism ([facility])  18. Encounter for annual health examination     Peripheral arterial disease (HCC) -stable. continue control of cad risk factors     Hyperlipidemia associated with type 2 diabetes mellitus ([facility])- check labs. Restart statin     Peripheral arterial disease (HCC)-stable. continue control of cad risk factors. Restart statin     Obstructive sleep apnea syndrome- stable cont cpap     Hypertension associated with diabetes ([facility]) -controlled. Cont current med therapy. Cont lisinopril . Discussed med adherence     Vascular parkinsonism (HCC)- stable sxs. Cont use of cane. Sees neurology at [facility]     Sensorineural hearing loss (SNHL) of both ears- stable, monitor     Recurrent major depressive disorder (HCC)- lexapro 10 mg daily     NPH (normal pressure hydrocephalus) ([facility])- s/p  shunt. Dr Carvalho neurosurgery. TAMIKO     Iliac aneurysm (HCC)-stable. continue control of cad risk factors     Former smoker- cont off tobacco     Coronary artery calcification seen on CT scan-stable. continue control of cad risk factors. Restart statin     Abdominal aortic aneurysm (AAA) without rupture- stable. Cont HTN control     History of stroke-silent x2-stable. continue control of cad risk factors. Restart moderate intense dose statin     AVM (arteriovenous malformation) brain- s/p gamma knife 3/2018-TAMIKO     Personal history of malignant neuroendocrine  tumor- pancreas- defer to Dr Armenta     Diabetes mellitus with complication (HCC)  (HCC)-  controlled . Cont metformin to 750 mg qam and check labs.      History of prostate cancer- [place] 4+4 2013 s/p resection. Elevated PSA. Appears urology plan for watchful surveillance.     The patient indicates understanding of these issues and agrees to the plan.  Continue with current treatment plan.  Lab work ordered.  Reinforced healthy diet, lifestyle, and exercise.      No follow-ups on file.     Jabier Genao MD, 12/15/2024     Supplementary Documentation:   General Health:  In the past six months, have you lost more than 10 pounds without trying?: 2 - No  Has your appetite been poor?: No  Type of Diet: Balanced  How does the patient maintain a good energy level?: Other  How would you describe your daily physical activity?: Light  How would you describe your current health state?: Fair  How do you maintain positive mental well-being?: Social Interaction;Visiting Family  On a scale of 0 to 10, with 0 being no pain and 10 being severe pain, what is your pain level?: 0 - (None)  In the past six months, have you experienced urine leakage?: 1-Yes  At any time do you feel concerned for the safety/well-being of yourself and/or your children, in your home or elsewhere?: No  Have you had any immunizations at another office such as Influenza, Hepatitis B, Tetanus, or Pneumococcal?: No    Health Maintenance   Topic Date Due    Colorectal Cancer Screening  01/09/2023    COVID-19 Vaccine (3 - 2024-25 season) 09/01/2024    Influenza Vaccine (1) 10/01/2024    PSA  10/19/2024    Annual Physical  10/26/2024    Diabetes Care A1C  12/24/2024    Diabetes Care: Microalb/Creat Ratio  03/05/2025    Diabetes Care Dilated Eye Exam  04/23/2025    Diabetes Care Foot Exam  06/24/2025    Diabetes Care: GFR  06/24/2025    Annual Depression Screening  Completed    Fall Risk Screening (Annual)  Completed    Pneumococcal Vaccine: 65+ Years  Completed     Zoster Vaccines  Completed

## 2024-12-23 RX ORDER — TAMSULOSIN HYDROCHLORIDE 0.4 MG/1
0.4 CAPSULE ORAL NIGHTLY
Qty: 30 CAPSULE | Refills: 0 | Status: SHIPPED | OUTPATIENT
Start: 2024-12-23

## 2024-12-23 NOTE — TELEPHONE ENCOUNTER
lisinopril 20 MG Oral Tab [[id number]] (Order [id number])     REFILL THIS MEDICATION ALSO    2 MEDICATIONS TOTAL

## 2024-12-23 NOTE — TELEPHONE ENCOUNTER
Last time medication was refilled 11/26/2024  Last office visit  12/16/2024  Next office visit due/scheduled No future appointments.      Medication not on protocol.

## 2024-12-31 ENCOUNTER — HOSPITAL ENCOUNTER (INPATIENT)
Facility: HOSPITAL | Age: 74
LOS: 1 days | Discharge: HOME | End: 2025-01-02
Attending: EMERGENCY MEDICINE | Admitting: HOSPITALIST
Payer: MEDICARE

## 2024-12-31 ENCOUNTER — APPOINTMENT (OUTPATIENT)
Dept: ULTRASOUND IMAGING | Facility: HOSPITAL | Age: 74
End: 2024-12-31
Attending: EMERGENCY MEDICINE
Payer: MEDICARE

## 2024-12-31 DIAGNOSIS — Z86.73 HISTORY OF STROKE: ICD-10-CM

## 2024-12-31 DIAGNOSIS — R41.0 CONFUSION: ICD-10-CM

## 2024-12-31 DIAGNOSIS — I25.10 CORONARY ARTERY CALCIFICATION SEEN ON CT SCAN: ICD-10-CM

## 2024-12-31 DIAGNOSIS — U07.1 COVID-19: Primary | ICD-10-CM

## 2024-12-31 DIAGNOSIS — E11.9 TYPE 2 DIABETES MELLITUS WITHOUT COMPLICATION, WITHOUT LONG-TERM CURRENT USE OF INSULIN (HCC): ICD-10-CM

## 2024-12-31 DIAGNOSIS — R53.1 WEAKNESS GENERALIZED: ICD-10-CM

## 2024-12-31 LAB
ALBUMIN SERPL-MCNC: 4.2 G/DL (ref 3.2–4.8)
ALBUMIN/GLOB SERPL: 1.6 {RATIO} (ref 1–2)
ALP LIVER SERPL-CCNC: 55 U/L
ALT SERPL-CCNC: 15 U/L
ANION GAP SERPL CALC-SCNC: 7 MMOL/L (ref 0–18)
AST SERPL-CCNC: 14 U/L (ref ?–34)
BASOPHILS # BLD AUTO: 0.03 X10(3) UL (ref 0–0.2)
BASOPHILS NFR BLD AUTO: 0.4 %
BILIRUB SERPL-MCNC: 0.5 MG/DL (ref 0.2–1.1)
BUN BLD-MCNC: 18 MG/DL (ref 9–23)
CALCIUM BLD-MCNC: 8.9 MG/DL (ref 8.7–10.4)
CHLORIDE SERPL-SCNC: 102 MMOL/L (ref 98–112)
CO2 SERPL-SCNC: 24 MMOL/L (ref 21–32)
CREAT BLD-MCNC: 0.97 MG/DL
EGFRCR SERPLBLD CKD-EPI 2021: 82 ML/MIN/1.73M2 (ref 60–?)
EOSINOPHIL # BLD AUTO: 0.04 X10(3) UL (ref 0–0.7)
EOSINOPHIL NFR BLD AUTO: 0.5 %
ERYTHROCYTE [DISTWIDTH] IN BLOOD BY AUTOMATED COUNT: 13.5 %
FLUAV + FLUBV RNA SPEC NAA+PROBE: NEGATIVE
FLUAV + FLUBV RNA SPEC NAA+PROBE: NEGATIVE
GLOBULIN PLAS-MCNC: 2.7 G/DL (ref 2–3.5)
GLUCOSE BLD-MCNC: 152 MG/DL (ref 70–99)
HCT VFR BLD AUTO: 36.6 %
HGB BLD-MCNC: 12.1 G/DL
IMM GRANULOCYTES # BLD AUTO: 0.03 X10(3) UL (ref 0–1)
IMM GRANULOCYTES NFR BLD: 0.4 %
LYMPHOCYTES # BLD AUTO: 0.62 X10(3) UL (ref 1–4)
LYMPHOCYTES NFR BLD AUTO: 7.6 %
MCH RBC QN AUTO: 30.3 PG (ref 26–34)
MCHC RBC AUTO-ENTMCNC: 33.1 G/DL (ref 31–37)
MCV RBC AUTO: 91.5 FL
MONOCYTES # BLD AUTO: 0.93 X10(3) UL (ref 0.1–1)
MONOCYTES NFR BLD AUTO: 11.4 %
NEUTROPHILS # BLD AUTO: 6.54 X10 (3) UL (ref 1.5–7.7)
NEUTROPHILS # BLD AUTO: 6.54 X10(3) UL (ref 1.5–7.7)
NEUTROPHILS NFR BLD AUTO: 79.7 %
OSMOLALITY SERPL CALC.SUM OF ELEC: 281 MOSM/KG (ref 275–295)
PLATELET # BLD AUTO: 151 10(3)UL (ref 150–450)
POTASSIUM SERPL-SCNC: 3.7 MMOL/L (ref 3.5–5.1)
PROT SERPL-MCNC: 6.9 G/DL (ref 5.7–8.2)
RBC # BLD AUTO: 4 X10(6)UL
RSV RNA SPEC NAA+PROBE: NEGATIVE
SARS-COV-2 RNA RESP QL NAA+PROBE: DETECTED
SODIUM SERPL-SCNC: 133 MMOL/L (ref 136–145)
WBC # BLD AUTO: 8.2 X10(3) UL (ref 4–11)

## 2024-12-31 PROCEDURE — 93970 EXTREMITY STUDY: CPT | Performed by: EMERGENCY MEDICINE

## 2024-12-31 NOTE — ED INITIAL ASSESSMENT (HPI)
Patient from home via EMS here for generalized weakness that has been going on for months. In the last couple days patient has been unable to stand. Hx of parkinsons.

## 2025-01-01 ENCOUNTER — APPOINTMENT (OUTPATIENT)
Dept: CT IMAGING | Facility: HOSPITAL | Age: 75
End: 2025-01-01
Attending: EMERGENCY MEDICINE
Payer: MEDICARE

## 2025-01-01 PROBLEM — R41.0 CONFUSION: Status: ACTIVE | Noted: 2025-01-01

## 2025-01-01 PROBLEM — R53.1 WEAKNESS GENERALIZED: Status: ACTIVE | Noted: 2025-01-01

## 2025-01-01 PROBLEM — U07.1 COVID-19: Status: ACTIVE | Noted: 2025-01-01

## 2025-01-01 LAB
BILIRUB UR QL STRIP.AUTO: NEGATIVE
CLARITY UR REFRACT.AUTO: CLEAR
COLOR UR AUTO: YELLOW
CREAT BLD-MCNC: 0.97 MG/DL
EGFRCR SERPLBLD CKD-EPI 2021: 82 ML/MIN/1.73M2 (ref 60–?)
EST. AVERAGE GLUCOSE BLD GHB EST-MCNC: 174 MG/DL (ref 68–126)
GLUCOSE BLD-MCNC: 136 MG/DL (ref 70–99)
GLUCOSE BLD-MCNC: 147 MG/DL (ref 70–99)
GLUCOSE BLD-MCNC: 151 MG/DL (ref 70–99)
GLUCOSE BLD-MCNC: 181 MG/DL (ref 70–99)
GLUCOSE UR STRIP.AUTO-MCNC: 100 MG/DL
HBA1C MFR BLD: 7.7 % (ref ?–5.7)
KETONES UR STRIP.AUTO-MCNC: NEGATIVE MG/DL
LEUKOCYTE ESTERASE UR QL STRIP.AUTO: NEGATIVE
NITRITE UR QL STRIP.AUTO: NEGATIVE
PH UR STRIP.AUTO: 5.5 [PH] (ref 5–8)
SP GR UR STRIP.AUTO: >=1.03 (ref 1–1.03)
UROBILINOGEN UR STRIP.AUTO-MCNC: 0.2 MG/DL

## 2025-01-01 PROCEDURE — 70450 CT HEAD/BRAIN W/O DYE: CPT | Performed by: EMERGENCY MEDICINE

## 2025-01-01 PROCEDURE — 99223 1ST HOSP IP/OBS HIGH 75: CPT | Performed by: HOSPITALIST

## 2025-01-01 PROCEDURE — 5A09357 ASSISTANCE WITH RESPIRATORY VENTILATION, LESS THAN 24 CONSECUTIVE HOURS, CONTINUOUS POSITIVE AIRWAY PRESSURE: ICD-10-PCS | Performed by: HOSPITALIST

## 2025-01-01 RX ORDER — NICOTINE POLACRILEX 4 MG
15 LOZENGE BUCCAL
Status: DISCONTINUED | OUTPATIENT
Start: 2025-01-01 | End: 2025-01-02

## 2025-01-01 RX ORDER — ROSUVASTATIN CALCIUM 10 MG/1
20 TABLET, COATED ORAL DAILY
Status: DISCONTINUED | OUTPATIENT
Start: 2025-01-01 | End: 2025-01-01

## 2025-01-01 RX ORDER — METOCLOPRAMIDE HYDROCHLORIDE 5 MG/ML
10 INJECTION INTRAMUSCULAR; INTRAVENOUS EVERY 8 HOURS PRN
Status: DISCONTINUED | OUTPATIENT
Start: 2025-01-01 | End: 2025-01-02

## 2025-01-01 RX ORDER — SODIUM PHOSPHATE, DIBASIC AND SODIUM PHOSPHATE, MONOBASIC 7; 19 G/230ML; G/230ML
1 ENEMA RECTAL ONCE AS NEEDED
Status: DISCONTINUED | OUTPATIENT
Start: 2025-01-01 | End: 2025-01-02

## 2025-01-01 RX ORDER — MIRABEGRON 25 MG/1
25 TABLET, FILM COATED, EXTENDED RELEASE ORAL DAILY
Status: DISCONTINUED | OUTPATIENT
Start: 2025-01-01 | End: 2025-01-02

## 2025-01-01 RX ORDER — POLYETHYLENE GLYCOL 3350 17 G/17G
17 POWDER, FOR SOLUTION ORAL DAILY PRN
Status: DISCONTINUED | OUTPATIENT
Start: 2025-01-01 | End: 2025-01-02

## 2025-01-01 RX ORDER — SENNOSIDES 8.6 MG
17.2 TABLET ORAL NIGHTLY PRN
Status: DISCONTINUED | OUTPATIENT
Start: 2025-01-01 | End: 2025-01-02

## 2025-01-01 RX ORDER — CARBIDOPA AND LEVODOPA 25; 100 MG/1; MG/1
1 TABLET ORAL
Status: DISCONTINUED | OUTPATIENT
Start: 2025-01-01 | End: 2025-01-02

## 2025-01-01 RX ORDER — BISACODYL 10 MG
10 SUPPOSITORY, RECTAL RECTAL
Status: DISCONTINUED | OUTPATIENT
Start: 2025-01-01 | End: 2025-01-02

## 2025-01-01 RX ORDER — ACETAMINOPHEN 500 MG
500 TABLET ORAL EVERY 4 HOURS PRN
Status: DISCONTINUED | OUTPATIENT
Start: 2025-01-01 | End: 2025-01-02

## 2025-01-01 RX ORDER — ASPIRIN 81 MG/1
81 TABLET ORAL DAILY
Status: DISCONTINUED | OUTPATIENT
Start: 2025-01-01 | End: 2025-01-02

## 2025-01-01 RX ORDER — LISINOPRIL 10 MG/1
10 TABLET ORAL EVERY MORNING
Status: DISCONTINUED | OUTPATIENT
Start: 2025-01-01 | End: 2025-01-02

## 2025-01-01 RX ORDER — ESCITALOPRAM OXALATE 20 MG/1
20 TABLET ORAL DAILY
Status: DISCONTINUED | OUTPATIENT
Start: 2025-01-01 | End: 2025-01-02

## 2025-01-01 RX ORDER — HEPARIN SODIUM 5000 [USP'U]/ML
5000 INJECTION, SOLUTION INTRAVENOUS; SUBCUTANEOUS EVERY 8 HOURS SCHEDULED
Status: DISCONTINUED | OUTPATIENT
Start: 2025-01-01 | End: 2025-01-02

## 2025-01-01 RX ORDER — NICOTINE POLACRILEX 4 MG
30 LOZENGE BUCCAL
Status: DISCONTINUED | OUTPATIENT
Start: 2025-01-01 | End: 2025-01-02

## 2025-01-01 RX ORDER — PREDNISOLONE 5 MG/1
5 TABLET ORAL DAILY
COMMUNITY

## 2025-01-01 RX ORDER — ABIRATERONE ACETATE 250 MG/1
1000 TABLET ORAL
COMMUNITY

## 2025-01-01 RX ORDER — TAMSULOSIN HYDROCHLORIDE 0.4 MG/1
0.4 CAPSULE ORAL NIGHTLY
Status: DISCONTINUED | OUTPATIENT
Start: 2025-01-01 | End: 2025-01-02

## 2025-01-01 RX ORDER — ONDANSETRON 2 MG/ML
4 INJECTION INTRAMUSCULAR; INTRAVENOUS EVERY 6 HOURS PRN
Status: DISCONTINUED | OUTPATIENT
Start: 2025-01-01 | End: 2025-01-02

## 2025-01-01 RX ORDER — DEXTROSE MONOHYDRATE 25 G/50ML
50 INJECTION, SOLUTION INTRAVENOUS
Status: DISCONTINUED | OUTPATIENT
Start: 2025-01-01 | End: 2025-01-02

## 2025-01-01 NOTE — PHYSICAL THERAPY NOTE
PHYSICAL THERAPY EVALUATION - INPATIENT     Room Number: 514/514-A  Evaluation Date: 1/1/2025  Type of Evaluation: Initial  Physician Order: PT Eval and Treat    Presenting Problem: generalized weakness, COVID+  Co-Morbidities : Parkinson's, Diabetes, depression, HTN, hyperlipidemia  Reason for Therapy: Mobility Dysfunction and Discharge Planning    PHYSICAL THERAPY ASSESSMENT   Patient is a 74 year old male admitted 12/31/2024 for generalized weakness x months, COVID+.  Prior to admission, patient's baseline is ambulatory with the RW.  Patient is currently functioning below baseline with bed mobility, transfers, and gait.  Patient is requiring contact guard assist as a result of the following impairments: decreased functional strength, decreased endurance/aerobic capacity, impaired standing balance, and medical status.  Physical Therapy will continue to follow for duration of hospitalization.    Patient will benefit from continued skilled PT Services at discharge to promote prior level of function.  Anticipate patient will return home with home health PT.    PLAN DURING HOSPITALIZATION  Nursing Mobility Recommendation : 1 Assist  PT Device Recommendation: Rolling walker  PT Treatment Plan: Bed mobility;Gait training;Patient education;Range of motion;Strengthening;Transfer training  Rehab Potential : Good  Frequency (Obs): 3-5x/week     CURRENT GOALS    Goal #1 Patient is able to demonstrate supine - sit EOB @ level: modified independent     Goal #2 Patient is able to demonstrate transfers Sit to/from Stand at assistance level: supervision     Goal #3 Patient is able to ambulate 80 feet with assist device: walker - rolling at assistance level: supervision     Goal #4    Goal #5    Goal #6    Goal Comments: Goals established on 1/1/2025      PHYSICAL THERAPY MEDICAL/SOCIAL HISTORY  History related to current admission: Patient is a 74 year old male admitted on 12/31/2024 from home for generalized weakness.  Pt  diagnosed with COVID+.    HOME SITUATION  Type of Home: House  Home Layout: Two level  Stairs to Enter : 2 (thru garage)        Stairs to Bedroom: 13         Lives With: Spouse    Drives: No          Prior Level of [place]: Per patient, he ambulates using the RW at home, wife assists as needed but patient reports he is able to get dress by himself.  Per RN, wife is COVID+ too.  Patient and wife was in [place] for [place], patient reported they visited daughter.     SUBJECTIVE  \"I am okay, not short of breath\"    OBJECTIVE  Precautions: Bed/chair alarm  Fall Risk: Standard fall risk    WEIGHT BEARING RESTRICTION     PAIN ASSESSMENT  Ratin          COGNITION  Arousal/Alertness:  appropriate responses to stimuli  Following Commands:  follows one step commands with increased time and follows one step commands with repetition  Initiation: cues to initiate tasks and hand over hand to initiate tasks    RANGE OF MOTION AND STRENGTH ASSESSMENT  Upper extremity ROM and strength are within functional limits     Lower extremity ROM is within functional limits     Lower extremity strength is within functional limits     BALANCE  Static Sitting: Fair  Dynamic Sitting: Fair -  Static Standing: Fair - (with RW)  Dynamic Standing: Fair - (with RW)    ADDITIONAL TESTS                                    ACTIVITY TOLERANCE                         O2 WALK       NEUROLOGICAL FINDINGS                        AM-PAC '6-Clicks' INPATIENT SHORT FORM - BASIC MOBILITY  How much difficulty does the patient currently have...  Patient Difficulty: Turning over in bed (including adjusting bedclothes, sheets and blankets)?: A Little   Patient Difficulty: Sitting down on and standing up from a chair with arms (e.g., wheelchair, bedside commode, etc.): A Little   Patient Difficulty: Moving from lying on back to sitting on the side of the bed?: A Little   How much help from another person does the patient currently need...   Help from  Another: Moving to and from a bed to a chair (including a wheelchair)?: A Little   Help from Another: Need to walk in hospital room?: A Little   Help from Another: Climbing 3-5 steps with a railing?: A Little     AM-PAC Score:  Raw Score: 18   Approx Degree of Impairment: 46.58%   Standardized Score (AM-PAC Scale): 43.63   CMS Modifier (G-Code): CK    FUNCTIONAL ABILITY STATUS  Gait Assessment   Functional Mobility/Gait Assessment  Gait Assistance: Contact guard assist  Distance (ft): 14  Assistive Device: Rolling walker  Pattern:  (Decr shana/step length/heel-toe pattern)    Skilled Therapy Provided     Bed Mobility:  Rolling: SBA  Supine to sit: SBA   Sit to supine: cga-SBA     Transfer Mobility:  Sit to stand: cga with the RW   Stand to sit: cga  Gait = cga with the RW    Therapist's Comments: RN approved session, patient is in supine position with HOB slightly elevated.  Patient performed bed mobility, transfers and gait with the RW as above; patient stopped a few times during gait, patient reported (-)SOB or major fatigue when this PT inquired.      Exercise/Education Provided:  Patient was educated on safety during functional mobility tasks and emphasized to patient need to have staff assist to decrease fall risk, patient verbalized understanding.     Patient End of Session: In bed;Needs met;Call light within reach;RN aware of session/findings;All patient questions and concerns addressed;Alarm set      Patient Evaluation Complexity Level:  History Moderate - 1 or 2 personal factors and/or co-morbidities   Examination of body systems Low -  addressing 1-2 elements   Clinical Presentation Low- Stable   Clinical Decision Making Low Complexity       PT Session Time: 25 minutes  Gait Trainin minutes  Therapeutic Activity: 8 minutes  Neuromuscular Re-education:  minutes  Therapeutic Exercise:  minutes

## 2025-01-01 NOTE — CONSULTS
INFECTIOUS DISEASE CONSULT NOTE    Silvio Arellano Patient Status:  Inpatient    1950 MRN [id number]   [facility] 5NW-A Attending Matthew Polanco MD   Hosp Day # 0 PCP Jabier Genao MD       Reason for Consultation:  Covid infection    History of Present Illness:  Silvio Arellano is a a(n) 74 year old male with history of Parkinson's disease, type 2 diabetes, depression, hypertension, hyperlipidemia, COURT , who who presented to the emergency room on  c/o weakness.  Patient returning from a trip to [place] to visit family for [place]. when they arrived patient was so weak was not able to get out of the plane w/o help.  Patient started having cough about 2 days PTA, along with more confusion.  No fevers, chills, nausea, vomiting, diarrhea.  No chest pain, shortness of breath.   Has been afebrile here and is on RA. Feels much better today. Tolerating paxlovid.     History:  Past Medical History:    Anesthesia complication    slow to awaken    Brain mass    Cataract    Depression    Diabetes (HCC)    Essential hypertension    Exposure to radiation    24 radiation of prostate prior to seed implantation    Hearing impaired person, bilateral    High blood pressure    High cholesterol    Lung mass    Obesity    Obstructive sleep apnea (adult) (pediatric)    COURT (obstructive sleep apnea)    AHI 76 RDI 85 REM AHI 51 Supine AHI 77 non-supine AHI 60 Sao2 Gregg 78%     COURT on CPAP    6-20 [facility]    Prostate cancer (HCC)    s/p SEEDS 13    Sleep apnea    Stroke (HCC)    Tobacco dependence    Visual impairment     Past Surgical History:   Procedure Laterality Date    Colonoscopy  13= Diverticulosis, Hemorrhoids    Repeat     Other      anterior cervical neck sx  - metal    Other surgical history  13    PNBx - Dr. Berry    Other surgical history  13    Seeds Implant - [facility]    Remv cataract extracap,insert  lens  12/26/2012    Procedure: LEFT PHACOEMULSIFICATION OF CATARACT WITH INTRAOCULAR LENS IMPLANT [id number];  Surgeon: Nitin Ang MD;  [place] [facility] [place] [facility]    Remv cataract extracap,insert lens  11/28/2012    Procedure: RIGHT PHACOEMULSIFICATION OF CATARACT WITH INTRAOCULAR LENS IMPLANT [id number];  Surgeon: Nitin Ang MD;  Location: [facility]    Tonsillectomy       Family History   Problem Relation Age of Onset    Cancer Sister         breast cancer    Cancer Sister     Cancer Sister     Cancer Sister       reports that he quit smoking about 10 years ago. His smoking use included cigarettes. He started smoking about 40 years ago. He has a 15 pack-year smoking history. He has never used smokeless tobacco. He reports that he does not currently use alcohol after a past usage of about 2.0 standard drinks of alcohol per week. He reports that he does not use drugs.    Allergies:  Allergies[1]    Medications:    Current Facility-Administered Medications:     aspirin DR tab 81 mg, 81 mg, Oral, Daily    carbidopa-levodopa (SINEMET)  MG per tab 1 tablet, 1 tablet, Oral, TID AC    escitalopram (Lexapro) tab 20 mg, 20 mg, Oral, Daily    lisinopril (Zestril) tab 10 mg, 10 mg, Oral, QAM    [Held by provider] Mirabegron ER (MYRBETRIQ) 25 MG tablet 25 mg, 25 mg, Oral, Daily    [Held by provider] tamsulosin (Flomax) cap 0.4 mg, 0.4 mg, Oral, Nightly    glucose (Dex4) 15 GM/59ML oral liquid 15 g, 15 g, Oral, Q15 Min PRN **OR** glucose (Glutose) 40% oral gel 15 g, 15 g, Oral, Q15 Min PRN **OR** glucose-vitamin C (Dex-4) chewable tab 4 tablet, 4 tablet, Oral, Q15 Min PRN **OR** dextrose 50% injection 50 mL, 50 mL, Intravenous, Q15 Min PRN **OR** glucose (Dex4) 15 GM/59ML oral liquid 30 g, 30 g, Oral, Q15 Min PRN **OR** glucose (Glutose) 40% oral gel 30 g, 30 g, Oral, Q15 Min PRN **OR** glucose-vitamin C (Dex-4) chewable tab 8 tablet, 8 tablet, Oral, Q15 Min PRN    melatonin tab 3 mg, 3 mg, Oral,  Nightly PRN    ondansetron (Zofran) 4 MG/2ML injection 4 mg, 4 mg, Intravenous, Q6H PRN    metoclopramide (Reglan) 5 mg/mL injection 10 mg, 10 mg, Intravenous, Q8H PRN    heparin (Porcine) 5000 UNIT/ML injection 5,000 Units, 5,000 Units, Subcutaneous, Q8H JANET    acetaminophen (Tylenol Extra Strength) tab 500 mg, 500 mg, Oral, Q4H PRN    polyethylene glycol (PEG 3350) (Miralax) 17 g oral packet 17 g, 17 g, Oral, Daily PRN    sennosides (Senokot) tab 17.2 mg, 17.2 mg, Oral, Nightly PRN    bisacodyl (Dulcolax) 10 MG rectal suppository 10 mg, 10 mg, Rectal, Daily PRN    fleet enema (Fleet) rectal enema 133 mL, 1 enema, Rectal, Once PRN    insulin aspart (NovoLOG) 100 Units/mL FlexPen 1-10 Units, 1-10 Units, Subcutaneous, TID AC and HS    guaiFENesin (Robitussin) 100 MG/5 ML oral liquid 200 mg, 200 mg, Oral, Q4H PRN    nirmatrelvir-ritonavir (Paxlovid) 300-100 MG therapy pack 3 tablet, 3 tablet, Oral, BID    Review of Systems:    Completed. See pertinent positives and negatives in the the HPI.    Constitutional: Negative for chills, fevers.  Eyes: Negative for eye drainage and redness.  Ears, nose, mouth, throat: Negative for nasal congestion, sore throat  Respiratory: as above  Cardiovascular: Negative for chest pain, dyspnea  Gastrointestinal: Negative for vomiting, diarrhea, abdominal pain  : Negative for hematuria or flank pain  Integument: Negative for rash, pruritus.  Hematologic/lymphatic: Negative for easy bruising, bleeding  Musculoskeletal: Negative for new arthralgias, arthritis.  Neurological: Negative for dizziness, focal neuro deficits  Behavioral/Psych: Negative for anxiety or depression    Physical Exam:    General: No acute distress. Alert, appropriate  Vital signs: Blood pressure 157/77, pulse 93, temperature 99.2 °F (37.3 °C), temperature source Oral, resp. rate 22, weight 254 lb (115.2 kg), SpO2 96%.  HEENT: no scleral icterus or conjunctival injection. Moist mucous membranes. No thrush  Neck: No  lymphadenopathy.  Supple.  Respiratory: Clear to auscultation bilaterally.  No wheezes. No rhonchi.  Cardiovascular: S1, S2.  Regular rate and rhythm.  No murmurs.  Abdomen: Soft, nontender, nondistended.  Positive bowel sounds.  Musculoskeletal: Full range of motion of all extremities.  No arthritis or deformity  Integument: No rash. No open wounds.    Laboratory Data:    Recent Labs   Lab 12/31/24  1807   RBC 4.00   HGB 12.1*   HCT 36.6*   MCV 91.5   MCH 30.3   MCHC 33.1   RDW 13.5   NEPRELIM 6.54   WBC 8.2   .0     Recent Labs   Lab 12/31/24  1807 01/01/25  0705   *  --    BUN 18  --    CREATSERUM 0.97 0.97   CA 8.9  --    ALB 4.2  --    *  --    K 3.7  --      --    CO2 24.0  --    ALKPHO 55  --    AST 14  --    ALT 15  --    BILT 0.5  --    TP 6.9  --      SED RATE (mm/hr)   Date Value   09/03/2012 10     Erythrocyte Sedimentation Rate (mm/hr)   Date Value   01/12/2016 8      C-Reactive Protein (mg/dL)   Date Value   01/12/2016 0.28      No results found for: \"VANCT\"  Recent Labs   Lab 01/01/25  0010   COLORUR Yellow   CLARITY Clear   SPECGRAVITY >=1.030   GLUUR 100*   BILUR Negative   KETUR Negative   BLOODURINE Small*   PHURINE 5.5   PROUR 30 mg/dL*   UROBILINOGEN 0.2   NITRITE Negative   LEUUR Negative   WBCUR 1-5  1-5   RBCUR 3-5*  3-5*   BACUR Rare*  Rare*   EPIUR Few*  Few*        Microbiology    Reviewed in EMR,   No results found for the last 90 days.        Radiology: CT BRAIN OR HEAD (CPT=70450)    Result Date: 1/1/2025  PROCEDURE:  CT BRAIN OR HEAD (53606)  COMPARISON:  [facility] CT, CT BRAIN OR HEAD (62821), 5/07/2022, 3:16 PM.  INDICATIONS:  weakness  TECHNIQUE:  Noncontrast CT scanning is performed through the brain. Dose reduction techniques were used. Dose information is transmitted to the ACR (American College of Radiology) NRDR (National Radiology Data Registry) which includes the Dose Index Registry.  PATIENT STATED HISTORY: (As transcribed by Technologist)   Patient from home via EMS here for generalized weakness that has been going on for months. In the last couple days patient has been unable to stand. Hx of parkinsons.    FINDINGS/CONCLUSION:            1. Postoperative changes from a right ventriculostomy catheter in stable position.  This terminates at the posterior aspect of the left lateral ventricle.  No hydrocephalus.  There is linear hypoattenuation along the catheter, unchanged.    2. There is heterogeneous partially mineralized lesion within the body of the right lateral ventricle measuring 19 x 9 mm, not significantly changed since 5/7/2022.  Please refer to the prior brain MRI for further details.  3. 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  There is a background of minimal chronic small vessel ischemic disease. If there is clinical concern for acute ischemia/infarction, an MRI of the brain would be recommended for further evaluation.  4. No acute intracranial hemorrhage or extra-axial fluid.  No midline shift or significant mass effect.      LOCATION:  [facility]   Dictated by (CST): Stromberg, LeRoy, MD on 1/01/2025 at 5:32 AM     Finalized by (CST): Stromberg, LeRoy, MD on 1/01/2025 at 5:37 AM       US VENOUS DOPPLER LEG BILAT - DIAG IMG (CPT=93970)    Result Date: 12/31/2024  PROCEDURE:  US VENOUS DOPPLER LEG BILAT - DIAG IMG (CPT=93970)  COMPARISON:  None.  INDICATIONS:  Bilateral leg edema and swelling.  TECHNIQUE:  Real time, grey scale, and duplex ultrasound was used to evaluate the lower extremity venous system. B-mode two-dimensional images of the vascular structures, Doppler spectral analysis, and color flow.  Doppler imaging were performed.  The following veins were imaged bilaterally:  Common, deep, and superficial femoral, popliteal, sapheno-femoral junction, and posterior tibial veins.  PATIENT STATED HISTORY: (As transcribed by Technologist)     FINDINGS:  SAPHENOFEMORAL JUNCTION:  No reflux. THROMBI:  None  visible. COMPRESSION:  Normal compressibility, phasicity, and augmentation. OTHER:  Negative.            CONCLUSION:  No DVT in the bilateral lower extremities.   LOCATION:  [facility]   Dictated by (CST): Jovani Gaspar MD on 12/31/2024 at 11:53 PM     Finalized by (CST): Jovani Gaspar MD on 12/31/2024 at 11:53 PM            ASSESSMENT:  Covid infection  Symptoms for about 2 days on admission  Not hypoxic, but at high risk for complications due to age and comorbidities  PD  Type 2 DM  BPH- stable. No UTI symptoms. UA did not seem c/w UTI    PLAN:  -agree with paxlovid  -follow temps  -follow resp status  -ambulate as able  Hopefully able to dc home soon pending PT eval  Thank you for allowing me to participate in the care of this patient. Please do not hesitate to call if you have any questions.   I will continue to follow with you and will make further recommendations based on his progress.    Callie Castanon MD  1/1/2025  2:21 PM         [1]   Allergies  Allergen Reactions    Radiology Contrast Iodinated Dyes HIVES and RASH    Iodine I 131 Tositumomab RASH     MRI contrast or IV dye     Other RASH     Iodine Dye Injectable    Iodine (Topical) RASH     Cerner Allergy Text Annotation: iodine; pt allergic to CT Contrast. Not topic betadine solution  Iodine Dye Injectable  Iodine Dye Injectable

## 2025-01-01 NOTE — PLAN OF CARE
PT  ADMITTED ALERT, DISORIENTED AT TIMES, FORGETFUL, WIFE AT BEDSIDE, 97% ON RA, CPAP AT NIGHT, SR, AFEBRILE, INCONTINENT AT TIMES, EXTERNAL CATH IN PLACE, BED ALARM ON, NOTIFIED ID, DR. FOUZIA ODOM OF NEW CONSULT, ISOLATION    Problem: RESPIRATORY - ADULT  Goal: Achieves optimal ventilation and oxygenation  Description: INTERVENTIONS:  - Assess for changes in respiratory status  - Assess for changes in mentation and behavior  - Position to facilitate oxygenation and minimize respiratory effort  - Oxygen supplementation based on oxygen saturation or ABGs  - Provide Smoking Cessation handout, if applicable  - Encourage broncho-pulmonary hygiene including cough, deep breathe, Incentive Spirometry  - Assess the need for suctioning and perform as needed  - Assess and instruct to report SOB or any respiratory difficulty  - Respiratory Therapy support as indicated  - Manage/alleviate anxiety  - Monitor for signs/symptoms of CO2 retention  Outcome: Progressing

## 2025-01-01 NOTE — ED PROVIDER NOTES
Patient Seen in: [facility] Emergency Department      History     Chief Complaint   Patient presents with    Fatigue     Stated Complaint: weakness    Subjective:   HPI      74-year-old male brought in by wife with reports of weakness.  She reports he began coughing 1 to 2 days ago.  They just returned today from a trip to visit family for [place] in [place].  She noted he started appearing a bit weaker over the last day or 2.  Today they flew home from [place] and when they arrived here he was so weak he fell trying to get off the airplane and it took 4 people to get him off the plane.  She reports sometimes he requires some assistance with ambulation and he has been undergoing physical therapy.  He does have a history of Parkinson's.  She says he is a lot weaker than his normal self.  She says Stossel's been a little more disoriented than normal although he does frequently have episodes of mild disorientation.  Patient's wife reports she has noticed that his legs have been a lot more swollen than usual recently.  He typically does not swell and she does not believe he takes a diuretic.  Family was also noting during their trip that his legs were very swollen.  She reports he is not as active as he used to be and that was even more exacerbated by being in an unfamiliar environment down in [place].    Objective:     Past Medical History:    Anesthesia complication    slow to awaken    Brain mass    Essential hypertension    Exposure to radiation    24 radiation of prostate prior to seed implantation    High blood pressure    High cholesterol    Lung mass    Obesity    Obstructive sleep apnea (adult) (pediatric)    COURT (obstructive sleep apnea)    AHI 76 RDI 85 REM AHI 51 Supine AHI 77 non-supine AHI 60 Sao2 Gregg 78%     COURT on CPAP    6-20 [facility]    Prostate cancer (HCC)    s/p SEEDS 5/2/13    Sleep apnea    Tobacco dependence              Past Surgical History:   Procedure Laterality Date    Colonoscopy   1/9/13= Diverticulosis, Hemorrhoids    Repeat 2023    Other      anterior cervical neck sx  - metal    Other surgical history  2/20/13    PNBx - Dr. Berry    Other surgical history  5/2/13    Seeds Implant - [facility]    Remv cataract extracap,insert lens  12/26/2012    Procedure: LEFT PHACOEMULSIFICATION OF CATARACT WITH INTRAOCULAR LENS IMPLANT [id number];  Surgeon: Nitin Ang MD;  [facility]    Remv cataract extracap,insert lens  11/28/2012    Procedure: RIGHT PHACOEMULSIFICATION OF CATARACT WITH INTRAOCULAR LENS IMPLANT [id number];  Surgeon: Nitin Ang MD;  [facility] [place]    Tonsillectomy                  Social History     Socioeconomic History    Marital status:    Tobacco Use    Smoking status: Former     Current packs/day: 0.00     Average packs/day: 0.5 packs/day for 30.0 years (15.0 ttl pk-yrs)     Types: Cigarettes     Start date: 5/8/1984     Quit date: 5/8/2014     Years since quitting: 10.6    Smokeless tobacco: Never   Vaping Use    Vaping status: Never Used   Substance and Sexual Activity    Alcohol use: Not Currently     Alcohol/week: 2.0 standard drinks of alcohol     Types: 2 Cans of beer per week    Drug use: No   Other Topics Concern    Caffeine Concern Yes     Comment: coffee 1-2 daily    Exercise No   Social History Narrative    : 1979    Children: 2    Exercise: walks, limited    Employment: IT [employer]    Caffeine intake: 2 coffee/d         Social Drivers of Health     Food Insecurity: Low Risk  (8/19/2024)    Received from [facility]    Food Insecurity     Have there been times that your food ran out, and you didn't have money to get more?: No     Are there times that you worry that this might happen?: No   Transportation Needs: Low Risk  (8/19/2024)    Received from [facility]    Transportation Needs     Do you have trouble getting transportation to medical  appointments?: No   Housing Stability: Low Risk  (8/19/2024)    Received from [facility]    Housing Stability     Are you worried that your electric, gas, oil, or water might be shut off?: No     Are you concerned about having a safe and reliable place to live?: No                  Physical Exam     ED Triage Vitals [12/31/24 1751]   /77   Pulse 102   Resp 18   Temp 99.6 °F (37.6 °C)   Temp src Oral   SpO2 98 %   O2 Device None (Room air)       Current Vitals:   Vital Signs  BP: (!) 165/78  Pulse: 94  Resp: 19  Temp: 99.6 °F (37.6 °C)  Temp src: Oral  MAP (mmHg): (!) 103    Oxygen Therapy  SpO2: 95 %  O2 Device: None (Room air)        Physical Exam  General:  Vitals as listed.  No acute distress   HEENT: Sclerae anicteric.  Conjunctivae show no pallor.  Oropharynx clear, mucous membranes moist   Neck: supple, no rigidity   Lungs: good air exchange and clear   Heart: regular rate rhythm and no murmur   Abdomen: Soft and nontender.  No abdominal masses.  No peritoneal signs   Extremities: Pitting edema in bilateral lower extremities.  Normal peripheral pulses   Neuro: Awake and alert.  Moving all extremities.  Did have an episode where he was trying to get out of bed and could not tell us why.  No facial droop.  Clear speech.  Skin: no rashes or nodules    ED Course     Labs Reviewed   CBC WITH DIFFERENTIAL WITH PLATELET - Abnormal; Notable for the following components:       Result Value    HGB 12.1 (*)     HCT 36.6 (*)     Lymphocyte Absolute 0.62 (*)     All other components within normal limits   COMP METABOLIC PANEL (14) - Abnormal; Notable for the following components:    Glucose 152 (*)     Sodium 133 (*)     All other components within normal limits   URINALYSIS WITH CULTURE REFLEX - Abnormal; Notable for the following components:    Glucose Urine 100 (*)     Blood Urine Small (*)     Protein Urine 30 mg/dL (*)     RBC Urine 3-5 (*)     Bacteria Urine Rare (*)     Squamous Epi.  Cells Few (*)     All other components within normal limits   UA MICROSCOPIC ONLY, URINE - Abnormal; Notable for the following components:    RBC Urine 3-5 (*)     Bacteria Urine Rare (*)     Squamous Epi. Cells Few (*)     All other components within normal limits   SARS-COV-2/FLU A AND B/RSV BY PCR (GENEXPERT) - Abnormal; Notable for the following components:    SARS-CoV-2 (COVID-19) - (GeneXpert) Detected (*)     All other components within normal limits    Narrative:     This test is intended for the qualitative detection and differentiation of SARS-CoV-2, influenza A, influenza B, and respiratory syncytial virus (RSV) viral RNA in nasopharyngeal or nares swabs from individuals suspected of respiratory viral infection consistent with COVID-19 by their healthcare provider. Signs and symptoms of respiratory viral infection due to SARS-CoV-2, influenza, and RSV can be similar.    Test performed using the Xpert Xpress SARS-CoV-2/FLU/RSV (real time RT-PCR)  assay on the GeneXpert instrument, [employer], [place] [address].   This test is being used under the Food and Drug Administration's Emergency Use Authorization.    The authorized Fact Sheet for Healthcare Providers for this assay is available upon request from the laboratory.   RAINBOW DRAW LAVENDER   RAINBOW DRAW LIGHT GREEN            US VENOUS DOPPLER LEG BILAT - DIAG IMG (CPT=93970)    Result Date: 12/31/2024  PROCEDURE:  US VENOUS DOPPLER LEG BILAT - DIAG IMG (CPT=93970)  COMPARISON:  None.  INDICATIONS:  Bilateral leg edema and swelling.  TECHNIQUE:  Real time, grey scale, and duplex ultrasound was used to evaluate the lower extremity venous system. B-mode two-dimensional images of the vascular structures, Doppler spectral analysis, and color flow.  Doppler imaging were performed.  The following veins were imaged bilaterally:  Common, deep, and superficial femoral, popliteal, sapheno-femoral junction, and posterior tibial veins.  PATIENT STATED HISTORY: (As  transcribed by Technologist)     FINDINGS:  SAPHENOFEMORAL JUNCTION:  No reflux. THROMBI:  None visible. COMPRESSION:  Normal compressibility, phasicity, and augmentation. OTHER:  Negative.            CONCLUSION:  No DVT in the bilateral lower extremities.   LOCATION:  [facility]   Dictated by (CST): Jovani Gaspar MD on 12/31/2024 at 11:53 PM     Finalized by (CST): Jovani Gaspar MD on 12/31/2024 at 11:53 PM        CT head    Comparison: 5/7/2022    IMPRESSION:  no acute intracranial findings  No appreciable change from previous exam    Previous left sided striatocapsular infarct  Global cerebral volume loss and chronic microvascular changes in white matter  Right parietal approach  shunt catheter with tip in the left lateral ventricle  Stable size and configuration of ventricles         MDM      74-year-old male presents with general fatigue.  Wife reports they both developed a cough while down in [place] with family.    Differential includes but is not limited to bacterial pneumonia, viral URI, dehydration, metabolic disturbance, a life threat.    CBC, CMP, urinalysis, viral nasal swab, lower extremity ultrasound, CT head ordered for further evaluation.    My independent interpretation of CT of the brain is that there is no evidence of a large intracranial hemorrhage.    Laboratory evaluation shows minimal hyponatremia at 133.  No leukocytosis.  Viral nasal swab is positive for COVID-19 infection.  Ultrasound of the lower extremities shows no evidence of DVT.  No acute findings on CT of the brain.  The patient is very fatigued and currently unable to ambulate without assistance.  He is a big dante and weighs around 250 pounds.  His wife says she lives alone with him and cannot care for him with his current level of weakness.  I recommend hospitalization as I feel his weakness is likely related to this COVID-19 infection which appears to also be causing him to have a bit more disorientation than his baseline.  Patient  and family are comfortable with plan for hospitalization.  Discussed with admitting physician.        Admission disposition: 1/1/2025  1:47 AM           Medical Decision Making      Disposition and Plan     Clinical Impression:  1. COVID-19    2. Weakness generalized    3. Confusion         Disposition:  Admit  1/1/2025  1:47 am    Follow-up:  No follow-up provider specified.        Medications Prescribed:  Current Discharge Medication List              Supplementary Documentation:         Hospital Problems       Present on Admission  Date Reviewed: 12/16/2024            ICD-10-CM Noted POA    * (Principal) COVID-19 U07.1 1/1/2025 Unknown

## 2025-01-01 NOTE — ED QUICK NOTES
Orders for admission, patient is aware of plan and ready to go upstairs. Any questions, please call ED RN vanesa at extension A-pod.     Patient Covid vaccination status: Fully vaccinated     COVID Test Ordered in ED: SARS-CoV-2/Flu A and B/RSV by PCR (GeneXpert)= covid +    COVID Suspicion at Admission: N/A    Running Infusions:  None    Mental Status/LOC at time of transport: a/ox3=name,place & year-drowsy    Other pertinent information:   CIWA score: N/A   NIH score:  N/A

## 2025-01-01 NOTE — PLAN OF CARE
1/1 a/ox3, very forgetful. Impulsive. Bed alarm on . Ambulates with walker and gait belt x2 person assist. Has parkinsons, shuffle gait, denies any sob % on RA. On paxlovid. Call light within reach.    Problem: Diabetes/Glucose Control  Goal: Glucose maintained within prescribed range  Description: INTERVENTIONS:  - Monitor Blood Glucose as ordered  - Assess for signs and symptoms of hyperglycemia and hypoglycemia  - Administer ordered medications to maintain glucose within target range  - Assess barriers to adequate nutritional intake and initiate nutrition consult as needed  - Instruct patient on self management of diabetes  Outcome: Progressing        Problem: RESPIRATORY - ADULT  Goal: Achieves optimal ventilation and oxygenation  Description: INTERVENTIONS:  - Assess for changes in respiratory status  - Assess for changes in mentation and behavior  - Position to facilitate oxygenation and minimize respiratory effort  - Oxygen supplementation based on oxygen saturation or ABGs  - Provide Smoking Cessation handout, if applicable  - Encourage broncho-pulmonary hygiene including cough, deep breathe, Incentive Spirometry  - Assess the need for suctioning and perform as needed  - Assess and instruct to report SOB or any respiratory difficulty  - Respiratory Therapy support as indicated  - Manage/alleviate anxiety  - Monitor for signs/symptoms of CO2 retention  Outcome: Progressing     Problem: Patient/Family Goals  Goal: Patient/Family Long Term Goal  Description: Patient's Long Term Goal: to be able to go home with proper resources    Interventions:  - paxlovid   -oxygen per protocol  - See additional Care Plan goals for specific interventions  Outcome: Progressing  Goal: Patient/Family Short Term Goal  Description: Patient's Short Term Goal: to prevent from falling    Interventions:   - bed alarm, call light within reach,PT/OT    - See additional Care Plan goals for specific interventions  Outcome:  Progressing

## 2025-01-01 NOTE — H&P
[facility]     Silvio Arellano Patient Status:  Inpatient    1950 MRN [id number]   Location [facility] 5NW-A Attending Jerry Lemon*   Hosp Day # 0 PCP Jabier Genao MD     Chief Complaint: Weakness and fatigue    Subjective:    History of Present Illness:     Silvio Arellano is a 74 year old male with history of Parkinson's disease, type 2 diabetes, depression, hypertension, hyperlipidemia, COURT presents emergency room with weakness.  Patient returning from a trip to [place] to visit family for [place] when they arrived patient was so weak he fell try getting off the plane and took 4 people to get him off the plane.  Patient started having cough about 2 days ago.  Patient is also been a little more disoriented.  No fevers, chills, nausea, vomiting, diarrhea.  No chest pain, shortness of breath.    History/Other:    Past Medical History:  Past Medical History:    Anesthesia complication    slow to awaken    Brain mass    Cataract    Depression    Diabetes (HCC)    Essential hypertension    Exposure to radiation    24 radiation of prostate prior to seed implantation    Hearing impaired person, bilateral    High blood pressure    High cholesterol    Lung mass    Obesity    Obstructive sleep apnea (adult) (pediatric)    COURT (obstructive sleep apnea)    AHI 76 RDI 85 REM AHI 51 Supine AHI 77 non-supine AHI 60 Sao2 Gregg 78%     COURT on CPAP    6-20 [employer]    Prostate cancer (HCC)    s/p SEEDS 13    Sleep apnea    Stroke (HCC)    Tobacco dependence    Visual impairment     Past Surgical History:   Past Surgical History:   Procedure Laterality Date    Colonoscopy  13= Diverticulosis, Hemorrhoids    Repeat     Other      anterior cervical neck sx  - metal    Other surgical history  13    PNBx - Dr. Berry    Other surgical history  13    Seeds Implant - [facility]    Remv cataract extracap,insert lens  2012    Procedure: LEFT  PHACOEMULSIFICATION OF CATARACT WITH INTRAOCULAR LENS IMPLANT [id number];  Surgeon: Nitin Ang MD;  [place] [facility] [place]    Remv cataract extracap,insert lens  11/28/2012    Procedure: RIGHT PHACOEMULSIFICATION OF CATARACT WITH INTRAOCULAR LENS IMPLANT [id number];  Surgeon: Nitin Ang MD;  Location: [facility]    Tonsillectomy        Family History:   Family History   Problem Relation Age of Onset    Cancer Sister         breast cancer    Cancer Sister     Cancer Sister     Cancer Sister      Social History:    reports that he quit smoking about 10 years ago. His smoking use included cigarettes. He started smoking about 40 years ago. He has a 15 pack-year smoking history. He has never used smokeless tobacco. He reports that he does not currently use alcohol after a past usage of about 2.0 standard drinks of alcohol per week. He reports that he does not use drugs.     Allergies: Allergies[1]    Medications:  Medications Ordered Prior to Encounter[2]    Review of Systems:   A comprehensive review of systems was completed.    Pertinent positives and negatives noted in the HPI.    Objective:   Physical Exam:    /87 (BP Location: Left arm)   Pulse 93   Temp 98.5 °F (36.9 °C) (Oral)   Resp 20   Wt 254 lb (115.2 kg)   SpO2 97%   BMI 34.45 kg/m²   General: No acute distress, Alert  Respiratory: No rhonchi, no wheezes  Cardiovascular: S1, S2. Regular rate and rhythm  Abdomen: Soft, Non-tender, non-distended, positive bowel sounds  Neuro: No new focal deficits  Extremities: No edema      Results:    Labs:      Labs Last 24 Hours:    Recent Labs   Lab 12/31/24  1807   RBC 4.00   HGB 12.1*   HCT 36.6*   MCV 91.5   MCH 30.3   MCHC 33.1   RDW 13.5   NEPRELIM 6.54   WBC 8.2   .0       Recent Labs   Lab 12/31/24  1807   *   BUN 18   CREATSERUM 0.97   EGFRCR 82   CA 8.9   ALB 4.2   *   K 3.7      CO2 24.0   ALKPHO 55   AST 14   ALT 15   BILT 0.5   TP 6.9       Lab  Results   Component Value Date    PT 13.9 08/23/2012    INR 1.0 06/08/2018    INR 1.0 09/27/2017    INR 1.06 05/11/2017       No results for input(s): \"TROP\", \"TROPHS\", \"CK\" in the last 168 hours.    No results for input(s): \"TROP\", \"PBNP\" in the last 168 hours.    No results for input(s): \"PCT\" in the last 168 hours.    Imaging: Imaging data reviewed in Epic.    Assessment & Plan:      # COVID  -Symptoms started 2 days ago  -Will start Paxlovid  - PT/OT eval    # Parkinson's disease  -Will continue on carbidopa levodopa.    # Type 2 diabetes  -Will place on hypoglycemia protocol with correction factor insulin.    # Depression  -Will continue home medication.    # Hypertension  -Will continue on lisinopril.    # BPH  -Will continue on tamsulosin.    # Hyperlipidemia  -Will hold statin while on Paxlovid.        Plan of care discussed with patient at bedside.    Jerry Lemon, DO    Supplementary Documentation:     The 21st Century Cures Act makes medical notes like these available to patients in the interest of transparency. Please be advised this is a medical document. Medical documents are intended to carry relevant information, facts as evident, and the clinical opinion of the practitioner. The medical note is intended as peer to peer communication and may appear blunt or direct. It is written in medical language and may contain abbreviations or verbiage that are unfamiliar.               **Certification      PHYSICIAN Certification of Need for Inpatient Hospitalization - Initial Certification    Patient will require inpatient services that will reasonably be expected to span two midnight's based on the clinical documentation in H+P.   Based on patients current state of illness, I anticipate that, after discharge, patient will require TBD.                      [1]   Allergies  Allergen Reactions    Radiology Contrast Iodinated Dyes HIVES and RASH    Iodine I 131 Tositumomab RASH     MRI contrast or IV  dye     Other RASH     Iodine Dye Injectable    Iodine (Topical) RASH     Cerner Allergy Text Annotation: iodine; pt allergic to CT Contrast. Not topic betadine solution  Iodine Dye Injectable  Iodine Dye Injectable     [2]   No current facility-administered medications on file prior to encounter.     Current Outpatient Medications on File Prior to Encounter   Medication Sig Dispense Refill    Abiraterone Acetate 250 MG Oral Tab Take 1,000 mg by mouth before breakfast.      prednisoLONE 5 MG Oral Tab Take 1 tablet (5 mg total) by mouth daily.      PATIENT SUPPLIED MEDICATION Abiraterone acetate 250mg tab = take four tabs daily on empty stomach -one hr before foods or two hrs [facility]      TAMSULOSIN 0.4 MG Oral Cap TAKE 1 CAPSULE BY MOUTH NIGHTLY AT BEDTIME 30 capsule 0    GLIMEPIRIDE 2 MG Oral Tab TAKE 1 TABLET BY MOUTH BEFORE BREAKFAST 90 tablet 0    METFORMIN  MG Oral Tablet 24 Hr Take 2 tablets by mouth once daily 180 tablet 0    ESCITALOPRAM 20 MG Oral Tab Take 1 tablet by mouth once daily 90 tablet 0    carbidopa-levodopa  MG Oral Tab Take 1 tablet by mouth 3 (three) times daily before meals.      lisinopril 10 MG Oral Tab Take 1 tablet (10 mg total) by mouth every morning. 90 tablet 0    Mirabegron ER 25 MG Oral Tablet 24 Hr 1 tablet (25 mg total) daily.      rosuvastatin (CRESTOR) 20 MG Oral Tab Take 1 tablet (20 mg total) by mouth daily. 90 tablet 3    ergocalciferol 1.25 MG (36402 UT) Oral Cap Take by mouth daily.      Glucose Blood (ACCU-CHEK GUIDE) In Vitro Strip 1 each by Finger stick route daily. May dispense any glucometer, strips and lancets that insurance will cover DX: E11.9. Test once daily 100 strip 1    Blood Glucose Monitoring Suppl (ACCU-CHEK GUIDE) w/Device Does not apply Kit 1 each daily. Test once daily. DX. E11.9 May dispense any glucometer, strips and lancets that insurance will cover 1 kit 0    aspirin 81 MG Oral Tab EC Take 1 tablet (81 mg total)  by mouth daily. 30 tablet 0    NON FORMULARY       lisinopril 20 MG Oral Tab Take 1 tablet (20 mg total) by mouth daily. (Patient not taking: Reported on 1/1/2025)      predniSONE 10 MG Oral Tab Take 50 mg thirteen hours prior then seven hours and one hour prior to administration of contrast 15 tablet 0    diphenhydrAMINE HCl (BENADRYL ALLERGY) 25 MG Oral Tab Take 50mg one hour prior to contrast administration 2 tablet 0    fluocinonide 0.05 % External Cream Apply 1 Application topically 2 (two) times daily. (Patient not taking: Reported on 12/31/2024) 30 g 0

## 2025-01-01 NOTE — CONSULTS
[facility] Dosing Service   Initial Renal Dosing and Drug-Drug Interaction (DDI) Review for Paxlovid (Nirmatrelvir/Ritonavir)    Silvio Arellano is a 74 year old male who is being initiated on Paxlovid (nirmatrelvir/ritonavir) therapy COVID-19 infection.  Pharmacy has been asked to dose and review Paxlovid DDI's by Dr Enriquez.      Labs:   Recent Labs   Lab 01/01/25  0705   EGFRCR 82       Assessment/Plan:   Eligibility criteria for Paxlovid use have been met..    1) Based on the above assessment:  Recommend initiation of Paxlovid (Nirmatrelvir-ritonavir 300-100 mg) PO BID x 5 days based on GFR  2) Potentially manageable (with or without expert consultation) DDI's exist. The below recommendations were made overnight.   Medication Recommended Management Strategy   rosuvastatin  Paxlovid may increase levels resulting increased risk of rhabdomyolysis - statin is currently on hold   May resume 3-5 days after last dose Paxlovid    tamsulosin  Paxlovid may increase levels of tamsulosin resulting in hypotension - currently on hold - may resume 3 days after last dose of Paxlovid   3) Pharmacy will continue to follow and monitor for DDI's for the duration of Paxlovid therapy.       Prior to Admission Medications  No current facility-administered medications on file prior to encounter.     Current Outpatient Medications on File Prior to Encounter   Medication Sig Dispense Refill    Abiraterone Acetate 250 MG Oral Tab Take 1,000 mg by mouth before breakfast.      prednisoLONE 5 MG Oral Tab Take 1 tablet (5 mg total) by mouth daily.      PATIENT SUPPLIED MEDICATION Abiraterone acetate 250mg tab = take four tabs daily on empty stomach -one hr before foods or two hrs [facility]      TAMSULOSIN 0.4 MG Oral Cap TAKE 1 CAPSULE BY MOUTH NIGHTLY AT BEDTIME 30 capsule 0    GLIMEPIRIDE 2 MG Oral Tab TAKE 1 TABLET BY MOUTH BEFORE BREAKFAST 90 tablet 0    METFORMIN  MG Oral Tablet 24 Hr Take 2  tablets by mouth once daily 180 tablet 0    ESCITALOPRAM 20 MG Oral Tab Take 1 tablet by mouth once daily 90 tablet 0    carbidopa-levodopa  MG Oral Tab Take 1 tablet by mouth 3 (three) times daily before meals.      lisinopril 10 MG Oral Tab Take 1 tablet (10 mg total) by mouth every morning. 90 tablet 0    Mirabegron ER 25 MG Oral Tablet 24 Hr 1 tablet (25 mg total) daily.      rosuvastatin (CRESTOR) 20 MG Oral Tab Take 1 tablet (20 mg total) by mouth daily. 90 tablet 3    ergocalciferol 1.25 MG (27162 UT) Oral Cap Take by mouth daily.      Glucose Blood (ACCU-CHEK GUIDE) In Vitro Strip 1 each by Finger stick route daily. May dispense any glucometer, strips and lancets that insurance will cover DX: E11.9. Test once daily 100 strip 1    Blood Glucose Monitoring Suppl (ACCU-CHEK GUIDE) w/Device Does not apply Kit 1 each daily. Test once daily. DX. E11.9 May dispense any glucometer, strips and lancets that insurance will cover 1 kit 0    aspirin 81 MG Oral Tab EC Take 1 tablet (81 mg total) by mouth daily. 30 tablet 0    NON FORMULARY       lisinopril 20 MG Oral Tab Take 1 tablet (20 mg total) by mouth daily. (Patient not taking: Reported on 1/1/2025)      predniSONE 10 MG Oral Tab Take 50 mg thirteen hours prior then seven hours and one hour prior to administration of contrast 15 tablet 0    diphenhydrAMINE HCl (BENADRYL ALLERGY) 25 MG Oral Tab Take 50mg one hour prior to contrast administration 2 tablet 0    fluocinonide 0.05 % External Cream Apply 1 Application topically 2 (two) times daily. (Patient not taking: Reported on 12/31/2024) 30 g 0       Current Medications    Current Facility-Administered Medications:     aspirin DR tab 81 mg, 81 mg, Oral, Daily    carbidopa-levodopa (SINEMET)  MG per tab 1 tablet, 1 tablet, Oral, TID AC    escitalopram (Lexapro) tab 20 mg, 20 mg, Oral, Daily    lisinopril (Zestril) tab 10 mg, 10 mg, Oral, QAM    [Held by provider] Mirabegron ER (MYRBETRIQ) 25 MG tablet 25  mg, 25 mg, Oral, Daily    [Held by provider] tamsulosin (Flomax) cap 0.4 mg, 0.4 mg, Oral, Nightly    glucose (Dex4) 15 GM/59ML oral liquid 15 g, 15 g, Oral, Q15 Min PRN **OR** glucose (Glutose) 40% oral gel 15 g, 15 g, Oral, Q15 Min PRN **OR** glucose-vitamin C (Dex-4) chewable tab 4 tablet, 4 tablet, Oral, Q15 Min PRN **OR** dextrose 50% injection 50 mL, 50 mL, Intravenous, Q15 Min PRN **OR** glucose (Dex4) 15 GM/59ML oral liquid 30 g, 30 g, Oral, Q15 Min PRN **OR** glucose (Glutose) 40% oral gel 30 g, 30 g, Oral, Q15 Min PRN **OR** glucose-vitamin C (Dex-4) chewable tab 8 tablet, 8 tablet, Oral, Q15 Min PRN    melatonin tab 3 mg, 3 mg, Oral, Nightly PRN    ondansetron (Zofran) 4 MG/2ML injection 4 mg, 4 mg, Intravenous, Q6H PRN    metoclopramide (Reglan) 5 mg/mL injection 10 mg, 10 mg, Intravenous, Q8H PRN    heparin (Porcine) 5000 UNIT/ML injection 5,000 Units, 5,000 Units, Subcutaneous, Q8H JANET    acetaminophen (Tylenol Extra Strength) tab 500 mg, 500 mg, Oral, Q4H PRN    polyethylene glycol (PEG 3350) (Miralax) 17 g oral packet 17 g, 17 g, Oral, Daily PRN    sennosides (Senokot) tab 17.2 mg, 17.2 mg, Oral, Nightly PRN    bisacodyl (Dulcolax) 10 MG rectal suppository 10 mg, 10 mg, Rectal, Daily PRN    fleet enema (Fleet) rectal enema 133 mL, 1 enema, Rectal, Once PRN    insulin aspart (NovoLOG) 100 Units/mL FlexPen 1-10 Units, 1-10 Units, Subcutaneous, TID AC and HS    guaiFENesin (Robitussin) 100 MG/5 ML oral liquid 200 mg, 200 mg, Oral, Q4H PRN    nirmatrelvir-ritonavir (Paxlovid) 300-100 MG therapy pack 3 tablet, 3 tablet, Oral, BID    Discontinued Medications:  Medications Discontinued During This Encounter   Medication Reason    rosuvastatin (Crestor) tab 20 mg Physician directed       We appreciate the opportunity to assist in the care of this patient.     Zoila Sierra [facility]  1/1/2025  11:43 AM  [facility] Extension: [phone]

## 2025-01-02 VITALS
RESPIRATION RATE: 18 BRPM | HEART RATE: 84 BPM | DIASTOLIC BLOOD PRESSURE: 72 MMHG | BODY MASS INDEX: 34 KG/M2 | SYSTOLIC BLOOD PRESSURE: 151 MMHG | OXYGEN SATURATION: 98 % | TEMPERATURE: 98 F | WEIGHT: 254 LBS

## 2025-01-02 LAB
ANION GAP SERPL CALC-SCNC: 9 MMOL/L (ref 0–18)
BASOPHILS # BLD AUTO: 0.02 X10(3) UL (ref 0–0.2)
BASOPHILS NFR BLD AUTO: 0.4 %
BUN BLD-MCNC: 10 MG/DL (ref 9–23)
CALCIUM BLD-MCNC: 8.7 MG/DL (ref 8.7–10.4)
CHLORIDE SERPL-SCNC: 105 MMOL/L (ref 98–112)
CO2 SERPL-SCNC: 26 MMOL/L (ref 21–32)
CREAT BLD-MCNC: 1 MG/DL
EGFRCR SERPLBLD CKD-EPI 2021: 79 ML/MIN/1.73M2 (ref 60–?)
EOSINOPHIL # BLD AUTO: 0 X10(3) UL (ref 0–0.7)
EOSINOPHIL NFR BLD AUTO: 0 %
ERYTHROCYTE [DISTWIDTH] IN BLOOD BY AUTOMATED COUNT: 13.5 %
GLUCOSE BLD-MCNC: 124 MG/DL (ref 70–99)
GLUCOSE BLD-MCNC: 127 MG/DL (ref 70–99)
GLUCOSE BLD-MCNC: 139 MG/DL (ref 70–99)
HCT VFR BLD AUTO: 36 %
HGB BLD-MCNC: 11.9 G/DL
IMM GRANULOCYTES # BLD AUTO: 0.02 X10(3) UL (ref 0–1)
IMM GRANULOCYTES NFR BLD: 0.4 %
LYMPHOCYTES # BLD AUTO: 1.12 X10(3) UL (ref 1–4)
LYMPHOCYTES NFR BLD AUTO: 20.2 %
MCH RBC QN AUTO: 30.1 PG (ref 26–34)
MCHC RBC AUTO-ENTMCNC: 33.1 G/DL (ref 31–37)
MCV RBC AUTO: 90.9 FL
MONOCYTES # BLD AUTO: 0.96 X10(3) UL (ref 0.1–1)
MONOCYTES NFR BLD AUTO: 17.3 %
NEUTROPHILS # BLD AUTO: 3.43 X10 (3) UL (ref 1.5–7.7)
NEUTROPHILS # BLD AUTO: 3.43 X10(3) UL (ref 1.5–7.7)
NEUTROPHILS NFR BLD AUTO: 61.7 %
OSMOLALITY SERPL CALC.SUM OF ELEC: 291 MOSM/KG (ref 275–295)
PLATELET # BLD AUTO: 129 10(3)UL (ref 150–450)
PLATELETS.RETICULATED NFR BLD AUTO: 6.8 % (ref 0–7)
POTASSIUM SERPL-SCNC: 4 MMOL/L (ref 3.5–5.1)
RBC # BLD AUTO: 3.96 X10(6)UL
SODIUM SERPL-SCNC: 140 MMOL/L (ref 136–145)
WBC # BLD AUTO: 5.6 X10(3) UL (ref 4–11)

## 2025-01-02 PROCEDURE — 99239 HOSP IP/OBS DSCHRG MGMT >30: CPT | Performed by: HOSPITALIST

## 2025-01-02 RX ORDER — TAMSULOSIN HYDROCHLORIDE 0.4 MG/1
0.4 CAPSULE ORAL NIGHTLY
Status: SHIPPED | COMMUNITY
Start: 2025-01-08

## 2025-01-02 RX ORDER — ROSUVASTATIN CALCIUM 20 MG/1
20 TABLET, COATED ORAL DAILY
Status: SHIPPED | COMMUNITY
Start: 2025-01-10

## 2025-01-02 RX ORDER — ROSUVASTATIN CALCIUM 20 MG/1
20 TABLET, COATED ORAL DAILY
Status: SHIPPED | COMMUNITY
Start: 2025-01-14 | End: 2025-01-02

## 2025-01-02 NOTE — CONSULTS
[facility]   part of [facility]    Report of Consultation    Silvio Arellano Patient Status:  Inpatient    1950 MRN [id number]   Location [facility] 5NW-A Attending Dain Noel,    Hosp Day # 0 PCP Jabier Genao MD     Date of Admission:  2024  Date of Consult:  2025  Reason for Consultation:   \"Urinary Retention,\" difficulty catheterizing    History of Present Illness:   Patient is a 74 year old male who was admitted to the hospital for COVID-19:    74 year old gentleman with a history of prostate cancer s/p brachytherapy in . Subsequent biochemical recurrence s/p cryoablation and ADT.  He has mixed urinary incontinence and a history of stricture disease. He underwent insertion of an AUS in  at [facility].    He was admitted due to COVID-19.      Urology was consulted this evening for urinary retention. Nursing noted they had attempted a straight catheterization, but were unsuccessful.  A 16 coude was eventually able to be inserted.  Upon review of his history, I noted he had an AUS    I came in and deactivated his AUS shortly after heath placement.     Past Medical History  Past Medical History:    Anesthesia complication    slow to awaken    Brain mass    Cataract    Depression    Diabetes (HCC)    Essential hypertension    Exposure to radiation    24 radiation of prostate prior to seed implantation    Hearing impaired person, bilateral    High blood pressure    High cholesterol    Lung mass    Obesity    Obstructive sleep apnea (adult) (pediatric)    COURT (obstructive sleep apnea)    AHI 76 RDI 85 REM AHI 51 Supine AHI 77 non-supine AHI 60 Sao2 Gregg 78%     COURT on CPAP    6-20 [employer]    Prostate cancer (HCC)    s/p SEEDS 13    Sleep apnea    Stroke (HCC)    Tobacco dependence    Visual impairment       Past Surgical History  Past Surgical History:   Procedure Laterality Date    Colonoscopy  13= Diverticulosis, Hemorrhoids    Repeat     Other       anterior cervical neck sx  - metal    Other surgical history  2/20/13    PNBx - Dr. Berry    Other surgical history  5/2/13    Seeds Implant - [facility]    Remv cataract extracap,insert lens  12/26/2012    Procedure: LEFT PHACOEMULSIFICATION OF CATARACT WITH INTRAOCULAR LENS IMPLANT [id number];  Surgeon: Nitin Ang MD;  Location: [facility]    Remv cataract extracap,insert lens  11/28/2012    Procedure: RIGHT PHACOEMULSIFICATION OF CATARACT WITH INTRAOCULAR LENS IMPLANT 79782;  Surgeon: Nitin Ang MD;  Location: [facility]    Tonsillectomy         Family History  Family History   Problem Relation Age of Onset    Cancer Sister         breast cancer    Cancer Sister     Cancer Sister     Cancer Sister        Social History  Social History     Socioeconomic History    Marital status:    Tobacco Use    Smoking status: Former     Current packs/day: 0.00     Average packs/day: 0.5 packs/day for 30.0 years (15.0 ttl pk-yrs)     Types: Cigarettes     Start date: 5/8/1984     Quit date: 5/8/2014     Years since quitting: 10.6    Smokeless tobacco: Never   Vaping Use    Vaping status: Never Used   Substance and Sexual Activity    Alcohol use: Not Currently     Alcohol/week: 2.0 standard drinks of alcohol     Types: 2 Cans of beer per week    Drug use: No   Other Topics Concern    Caffeine Concern Yes     Comment: coffee 1-2 daily    Exercise No   Social History Narrative    : 1979    Children: 2    Exercise: walks, limited    Employment: [employer]    Caffeine intake: 2 coffee/d         Social Drivers of Health     Food Insecurity: No Food Insecurity (1/1/2025)    Food Insecurity     Food Insecurity: Never true   Transportation Needs: No Transportation Needs (1/1/2025)    Transportation Needs     Lack of Transportation: No   Housing Stability: Low Risk  (1/1/2025)    Housing Stability     Housing Instability: No        Current Medications:  Current  Facility-Administered Medications   Medication Dose Route Frequency    aspirin DR tab 81 mg  81 mg Oral Daily    carbidopa-levodopa (SINEMET)  MG per tab 1 tablet  1 tablet Oral TID AC    escitalopram (Lexapro) tab 20 mg  20 mg Oral Daily    lisinopril (Zestril) tab 10 mg  10 mg Oral QAM    [Held by provider] Mirabegron ER (MYRBETRIQ) 25 MG tablet 25 mg  25 mg Oral Daily    [Held by provider] tamsulosin (Flomax) cap 0.4 mg  0.4 mg Oral Nightly    glucose (Dex4) 15 GM/59ML oral liquid 15 g  15 g Oral Q15 Min PRN    Or    glucose (Glutose) 40% oral gel 15 g  15 g Oral Q15 Min PRN    Or    glucose-vitamin C (Dex-4) chewable tab 4 tablet  4 tablet Oral Q15 Min PRN    Or    dextrose 50% injection 50 mL  50 mL Intravenous Q15 Min PRN    Or    glucose (Dex4) 15 GM/59ML oral liquid 30 g  30 g Oral Q15 Min PRN    Or    glucose (Glutose) 40% oral gel 30 g  30 g Oral Q15 Min PRN    Or    glucose-vitamin C (Dex-4) chewable tab 8 tablet  8 tablet Oral Q15 Min PRN    melatonin tab 3 mg  3 mg Oral Nightly PRN    ondansetron (Zofran) 4 MG/2ML injection 4 mg  4 mg Intravenous Q6H PRN    metoclopramide (Reglan) 5 mg/mL injection 10 mg  10 mg Intravenous Q8H PRN    heparin (Porcine) 5000 UNIT/ML injection 5,000 Units  5,000 Units Subcutaneous Q8H JANET    acetaminophen (Tylenol Extra Strength) tab 500 mg  500 mg Oral Q4H PRN    polyethylene glycol (PEG 3350) (Miralax) 17 g oral packet 17 g  17 g Oral Daily PRN    sennosides (Senokot) tab 17.2 mg  17.2 mg Oral Nightly PRN    bisacodyl (Dulcolax) 10 MG rectal suppository 10 mg  10 mg Rectal Daily PRN    fleet enema (Fleet) rectal enema 133 mL  1 enema Rectal Once PRN    insulin aspart (NovoLOG) 100 Units/mL FlexPen 1-10 Units  1-10 Units Subcutaneous TID AC and HS    guaiFENesin (Robitussin) 100 MG/5 ML oral liquid 200 mg  200 mg Oral Q4H PRN    nirmatrelvir-ritonavir (Paxlovid) 300-100 MG therapy pack 3 tablet  3 tablet Oral BID     Medications Prior to Admission   Medication Sig     Abiraterone Acetate 250 MG Oral Tab Take 1,000 mg by mouth before breakfast.    prednisoLONE 5 MG Oral Tab Take 1 tablet (5 mg total) by mouth daily.    PATIENT SUPPLIED MEDICATION Abiraterone acetate 250mg tab = take four tabs daily on empty stomach -one hr before foods or two hrs after food [facility]    TAMSULOSIN 0.4 MG Oral Cap TAKE 1 CAPSULE BY MOUTH NIGHTLY AT BEDTIME    GLIMEPIRIDE 2 MG Oral Tab TAKE 1 TABLET BY MOUTH BEFORE BREAKFAST    METFORMIN  MG Oral Tablet 24 Hr Take 2 tablets by mouth once daily    ESCITALOPRAM 20 MG Oral Tab Take 1 tablet by mouth once daily    carbidopa-levodopa  MG Oral Tab Take 1 tablet by mouth 3 (three) times daily before meals.    lisinopril 10 MG Oral Tab Take 1 tablet (10 mg total) by mouth every morning.    Mirabegron ER 25 MG Oral Tablet 24 Hr 1 tablet (25 mg total) daily.    rosuvastatin (CRESTOR) 20 MG Oral Tab Take 1 tablet (20 mg total) by mouth daily.    ergocalciferol 1.25 MG (19538 UT) Oral Cap Take by mouth daily.    Glucose Blood (ACCU-CHEK GUIDE) In Vitro Strip 1 each by Finger stick route daily. May dispense any glucometer, strips and lancets that insurance will cover DX: E11.9. Test once daily    Blood Glucose Monitoring Suppl (ACCU-CHEK GUIDE) w/Device Does not apply Kit 1 each daily. Test once daily. DX. E11.9 May dispense any glucometer, strips and lancets that insurance will cover    aspirin 81 MG Oral Tab EC Take 1 tablet (81 mg total) by mouth daily.    NON FORMULARY     lisinopril 20 MG Oral Tab Take 1 tablet (20 mg total) by mouth daily. (Patient not taking: Reported on 1/1/2025)    predniSONE 10 MG Oral Tab Take 50 mg thirteen hours prior then seven hours and one hour prior to administration of contrast    diphenhydrAMINE HCl (BENADRYL ALLERGY) 25 MG Oral Tab Take 50mg one hour prior to contrast administration    fluocinonide 0.05 % External Cream Apply 1 Application topically 2 (two) times daily. (Patient not taking:  Reported on 12/31/2024)       Allergies  Allergies[1]    Review of Systems:    Pertinent items are noted in HPI.    Physical Exam:   Blood pressure (!) 163/81, pulse 94, temperature 100.4 °F (38 °C), temperature source Oral, resp. rate (!) 27, weight 254 lb (115.2 kg), SpO2 94%.    General appearance: alert, appears stated age, and cooperative  Head: Normocephalic, without obvious abnormality, atraumatic  Pulmonary:  Non labored  Cardiovascular: regular rate and rhythm  Abdominal: soft, non-tender; bowel sounds normal; no masses,  no organomegaly  Male genitalia: 16F heath catheter in place, draining yellow urine.  He has an artifical urinary sphincter pump palpable in the scrotum. Upon exam, it was still activated.  I deactivated it given the heath placement.   Extremities: extremities normal, atraumatic, no cyanosis or edema  Neurologic: Grossly normal  Psychiatric: calm    Results:     Laboratory Data:  Lab Results   Component Value Date    WBC 8.2 12/31/2024    HGB 12.1 (L) 12/31/2024    HCT 36.6 (L) 12/31/2024    .0 12/31/2024    CREATSERUM 0.97 01/01/2025    BUN 18 12/31/2024     (L) 12/31/2024    K 3.7 12/31/2024     12/31/2024    CO2 24.0 12/31/2024     (H) 12/31/2024    CA 8.9 12/31/2024    ALB 4.2 12/31/2024    ALKPHO 55 12/31/2024    TP 6.9 12/31/2024    AST 14 12/31/2024    ALT 15 12/31/2024    PTT 23.6 (L) 06/08/2018    INR 1.0 06/08/2018    PTP 13.8 05/11/2017    T4F 1.0 10/20/2022    TSH 4.300 (H) 10/20/2022    PSA 10.30 (H) 10/20/2022    ESRML 8 01/12/2016    CRP 0.28 01/12/2016    MG 2.4 05/11/2017    PHOS 3.3 05/11/2017    TROP < 0.046 08/23/2012    CK 61 05/07/2022    ETOH 140 (H) 05/07/2022         Imaging:  CT BRAIN OR HEAD (CPT=70450)    Result Date: 1/1/2025   1. Postoperative changes from a right ventriculostomy catheter in stable position.  This terminates at the posterior aspect of the left lateral ventricle.  No hydrocephalus.  There is linear hypoattenuation  along the catheter, unchanged.    2. There is heterogeneous partially mineralized lesion within the body of the right lateral ventricle measuring 19 x 9 mm, not significantly changed since 5/7/2022.  Please refer to the prior brain MRI for further details.  3. 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  There is a background of minimal chronic small vessel ischemic disease. If there is clinical concern for acute ischemia/infarction, an MRI of the brain would be recommended for further evaluation.  4. No acute intracranial hemorrhage or extra-axial fluid.  No midline shift or significant mass effect.      LOCATION:  [facility]   Dictated by (CST): Stromberg, LeRoy, MD on 1/01/2025 at 5:32 AM     Finalized by (CST): Stromberg, LeRoy, MD on 1/01/2025 at 5:37 AM       US VENOUS DOPPLER LEG BILAT - DIAG IMG (CPT=93970)    Result Date: 12/31/2024  CONCLUSION:  No DVT in the bilateral lower extremities.   LOCATION:  [facility]   Dictated by (CST): Jovani Gaspar MD on 12/31/2024 at 11:53 PM     Finalized by (CST): Jovani Gaspar MD on 12/31/2024 at 11:53 PM           Impression:   74 year old gentleman, admitted with COVID-19.  Urology was consulted due to urinary retention and difficulty straight catheterizing the patient.  Upon review of his chart, he has an artificial urinary sphincter, which was placed at [facility] in August, 2024.     -His artifical urinary sphincter was activated.  This is the cause of his urinary retention.  -I deactivated his urinary sphincter, since a 16F heath catheter had been placed.  -Patient is confused and forgot about his sphincter, he noted.  -We will maintain his heath overnight with the sphincter deactivated. Plan for heath removal tomorrow.     Thank you for allowing me to participate in the care of your patient.    Ned Johnston MD  1/1/2025         [1]   Allergies  Allergen Reactions    Radiology Contrast Iodinated Dyes HIVES and RASH    Iodine I 131 Tositumomab  RASH     MRI contrast or IV dye     Other RASH     Iodine Dye Injectable    Iodine (Topical) RASH     [facility]erner Allergy Text Annotation: iodine; pt allergic to CT Contrast. Not topic betadine solution  Iodine Dye Injectable  Iodine Dye Injectable

## 2025-01-02 NOTE — PROGRESS NOTES
[facility]   part of [facility]    Progress Note    Silvio Arellano Patient Status:  Inpatient    1950 MRN [id number]   Location [facility] 5NW-A Attending Dain Noel,    Hosp Day # 1 PCP Jabier Genao MD     Subjective:   Silvio Arellano is a(n) 74 year old male with an artificial urinary sphincter (AUS).    prostate cancer s/p brachytherapy in   mixed urinary incontinence and a history of stricture disease. S/p AUS in  at [facility].  Admitted for covid 19, an IFC was placed prior to urology consult due to inability to void. Pt confessed at time of consult to confusion/forgot he had an AUS. Sphincter was deactivated by MD shortly after heath placement.       Objective:   Blood pressure 140/79, pulse 82, temperature 98.6 °F (37 °C), temperature source Oral, resp. rate 14, weight 254 lb (115.2 kg), SpO2 99%.    General appearance: alert, appears stated age, and cooperative  Abdominal: soft, non-tender; bowel sounds normal; no masses,  no organomegaly  Male genitalia: normal 16F heath catheter in place, draining yellow urine.  He has an artifical urinary sphincter pump palpable in the scrotum.     Results:   Lab Results   Component Value Date    WBC 5.6 2025    HGB 11.9 (L) 2025    HCT 36.0 (L) 2025    .0 (L) 2025    CREATSERUM 1.00 2025    BUN 10 2025     2025    K 4.0 2025     2025    CO2 26.0 2025     (H) 2025    CA 8.7 2025    ALB 4.2 2024    ALKPHO 55 2024    BILT 0.5 2024    TP 6.9 2024    AST 14 2024    ALT 15 2024    PTT 23.6 (L) 2018    INR 1.0 2018    PT 13.9 2012    T4F 1.0 10/20/2022    TSH 4.300 (H) 10/20/2022    PSA 10.30 (H) 10/20/2022    ESRML 8 2016    CRP 0.28 2016    MG 2.4 2017    PHOS 3.3 2017    CK 61 2022    ETOH 140 (H) 2022       CT BRAIN OR HEAD (CPT=70450)    Result  Date: 1/1/2025   1. Postoperative changes from a right ventriculostomy catheter in stable position.  This terminates at the posterior aspect of the left lateral ventricle.  No hydrocephalus.  There is linear hypoattenuation along the catheter, unchanged.    2. There is heterogeneous partially mineralized lesion within the body of the right lateral ventricle measuring 19 x 9 mm, not significantly changed since 5/7/2022.  Please refer to the prior brain MRI for further details.  3. 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  There is a background of minimal chronic small vessel ischemic disease. If there is clinical concern for acute ischemia/infarction, an MRI of the brain would be recommended for further evaluation.  4. No acute intracranial hemorrhage or extra-axial fluid.  No midline shift or significant mass effect.      LOCATION:  [facility]   Dictated by (CST): Stromberg, LeRoy, MD on 1/01/2025 at 5:32 AM     Finalized by (CST): Stromberg, LeRoy, MD on 1/01/2025 at 5:37 AM       US VENOUS DOPPLER LEG BILAT - DIAG IMG (CPT=93970)    Result Date: 12/31/2024  CONCLUSION:  No DVT in the bilateral lower extremities.   LOCATION:  [facility]   Dictated by (CST): Jovani Gaspar MD on 12/31/2024 at 11:53 PM     Finalized by (CST): Jovani Gaspar MD on 12/31/2024 at 11:53 PM            Assessment & Plan:     COVID-19    Weakness generalized    Confusion    Remove indwelling heath  Leave AUS deactivated while inpatient  (There will be urine leakage)   Plan to reactivate tomorrow  Follow up with established urologist (Dr Galarza [facility]) for cystoscopy- rule out AUS injury       Shanon Bradley PA-C  1/2/2025

## 2025-01-02 NOTE — PROGRESS NOTES
INFECTIOUS DISEASE PROGRESS NOTE    Silvio Arellano Patient Status:  Inpatient    1950 MRN [id number]   [facility] 5NW-A Attending Matthew Polanco MD   Hosp Day # 1 PCP Jabier Genao MD     Antimicrobials: paxlovid D#2    Subjective: Patient seen and examined today. Denies any further nausea or vomiting. Denies any diarrhea. Reports a little bit of a cough but denies any SOB. On room air.    Allergies:  Allergies[1]    Medications:    Current Facility-Administered Medications:     aspirin DR tab 81 mg, 81 mg, Oral, Daily    carbidopa-levodopa (SINEMET)  MG per tab 1 tablet, 1 tablet, Oral, TID AC    escitalopram (Lexapro) tab 20 mg, 20 mg, Oral, Daily    lisinopril (Zestril) tab 10 mg, 10 mg, Oral, QAM    [Held by provider] Mirabegron ER (MYRBETRIQ) 25 MG tablet 25 mg, 25 mg, Oral, Daily    [Held by provider] tamsulosin (Flomax) cap 0.4 mg, 0.4 mg, Oral, Nightly    glucose (Dex4) 15 GM/59ML oral liquid 15 g, 15 g, Oral, Q15 Min PRN **OR** glucose (Glutose) 40% oral gel 15 g, 15 g, Oral, Q15 Min PRN **OR** glucose-vitamin C (Dex-4) chewable tab 4 tablet, 4 tablet, Oral, Q15 Min PRN **OR** dextrose 50% injection 50 mL, 50 mL, Intravenous, Q15 Min PRN **OR** glucose (Dex4) 15 GM/59ML oral liquid 30 g, 30 g, Oral, Q15 Min PRN **OR** glucose (Glutose) 40% oral gel 30 g, 30 g, Oral, Q15 Min PRN **OR** glucose-vitamin C (Dex-4) chewable tab 8 tablet, 8 tablet, Oral, Q15 Min PRN    melatonin tab 3 mg, 3 mg, Oral, Nightly PRN    ondansetron (Zofran) 4 MG/2ML injection 4 mg, 4 mg, Intravenous, Q6H PRN    metoclopramide (Reglan) 5 mg/mL injection 10 mg, 10 mg, Intravenous, Q8H PRN    heparin (Porcine) 5000 UNIT/ML injection 5,000 Units, 5,000 Units, Subcutaneous, Q8H [facility]    acetaminophen (Tylenol Extra Strength) tab 500 mg, 500 mg, Oral, Q4H PRN    polyethylene glycol (PEG 3350) (Miralax) 17 g oral packet 17 g, 17 g, Oral, Daily PRN    sennosides  (Senokot) tab 17.2 mg, 17.2 mg, Oral, Nightly PRN    bisacodyl (Dulcolax) 10 MG rectal suppository 10 mg, 10 mg, Rectal, Daily PRN    fleet enema (Fleet) rectal enema 133 mL, 1 enema, Rectal, Once PRN    insulin aspart (NovoLOG) 100 Units/mL FlexPen 1-10 Units, 1-10 Units, Subcutaneous, TID AC and HS    guaiFENesin (Robitussin) 100 MG/5 ML oral liquid 200 mg, 200 mg, Oral, Q4H PRN    nirmatrelvir-ritonavir (Paxlovid) 300-100 MG therapy pack 3 tablet, 3 tablet, Oral, BID    Review of Systems:    Completed. See pertinent positives and negatives in the the HPI.    Constitutional: Negative for chills, fevers.  Eyes: Negative for eye drainage and redness.  Ears, nose, mouth, throat: Negative for nasal congestion, sore throat  Respiratory: as above  Cardiovascular: Negative for chest pain, dyspnea  Gastrointestinal: Negative for vomiting, diarrhea, abdominal pain  : Negative for hematuria or flank pain  Integument: Negative for rash, pruritus.  Hematologic/lymphatic: Negative for easy bruising, bleeding  Musculoskeletal: Negative for new arthralgias, arthritis.  Neurological: Negative for dizziness, focal neuro deficits  Behavioral/Psych: Negative for anxiety or depression    Physical Exam:    General: No acute distress. Alert, appropriate  Vital signs: Blood pressure 151/72, pulse 84, temperature 98.4 °F (36.9 °C), temperature source Oral, resp. rate 18, weight 254 lb (115.2 kg), SpO2 98%.  HEENT: no scleral icterus or conjunctival injection. Moist mucous membranes. No thrush  Neck: No lymphadenopathy.  Supple.  Respiratory: Clear to auscultation bilaterally.  No wheezes. No rhonchi.  Cardiovascular: S1, S2.  Regular rate and rhythm.  No murmurs.  Abdomen: Soft, nontender, nondistended.  Positive bowel sounds.  Musculoskeletal: Full range of motion of all extremities.  No arthritis or deformity  Integument: No rash. No open wounds.    Laboratory Data:    Recent Labs   Lab 12/31/24  1807 01/02/25  0736   RBC 4.00 3.96    HGB 12.1* 11.9*   HCT 36.6* 36.0*   MCV 91.5 90.9   MCH 30.3 30.1   MCHC 33.1 33.1   RDW 13.5 13.5   NEPRELIM 6.54 3.43   WBC 8.2 5.6   .0 129.0*     Recent Labs   Lab 12/31/24  1807 01/01/25  0705 01/02/25  0736   *  --  139*   BUN 18  --  10   CREATSERUM 0.97 0.97 1.00   CA 8.9  --  8.7   ALB 4.2  --   --    *  --  140   K 3.7  --  4.0     --  105   CO2 24.0  --  26.0   ALKPHO 55  --   --    AST 14  --   --    ALT 15  --   --    BILT 0.5  --   --    TP 6.9  --   --      SED RATE (mm/hr)   Date Value   09/03/2012 10     Erythrocyte Sedimentation Rate (mm/hr)   Date Value   01/12/2016 8      C-Reactive Protein (mg/dL)   Date Value   01/12/2016 0.28      No results found for: \"VANCT\"  Recent Labs   Lab 01/01/25  0010   COLORUR Yellow   CLARITY Clear   SPECGRAVITY >=1.030   GLUUR 100*   BILUR Negative   KETUR Negative   BLOODURINE Small*   PHURINE 5.5   PROUR 30 mg/dL*   UROBILINOGEN 0.2   NITRITE Negative   LEUUR Negative   WBCUR 1-5  1-5   RBCUR 3-5*  3-5*   BACUR Rare*  Rare*   EPIUR Few*  Few*        Microbiology    Reviewed in EMR,   No results found for the last 90 days.        Radiology: CT BRAIN OR HEAD (CPT=70450)    Result Date: 1/1/2025  PROCEDURE:  CT BRAIN OR HEAD (02021)  COMPARISON:  [facility] , CT, CT BRAIN OR HEAD (56253), 5/07/2022, 3:16 PM.  INDICATIONS:  weakness  TECHNIQUE:  Noncontrast CT scanning is performed through the brain. Dose reduction techniques were used. Dose information is transmitted to the ACR (American College of Radiology) NRDR (National Radiology Data Registry) which includes the Dose Index Registry.  PATIENT STATED HISTORY: (As transcribed by Technologist)  Patient from home via EMS here for generalized weakness that has been going on for months. In the last couple days patient has been unable to stand. Hx of parkinsons.    FINDINGS/CONCLUSION:            1. Postoperative changes from a right ventriculostomy catheter in stable position.  This  terminates at the posterior aspect of the left lateral ventricle.  No hydrocephalus.  There is linear hypoattenuation along the catheter, unchanged.    2. There is heterogeneous partially mineralized lesion within the body of the right lateral ventricle measuring 19 x 9 mm, not significantly changed since 5/7/2022.  Please refer to the prior brain MRI for further details.  3. 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  There is a background of minimal chronic small vessel ischemic disease. If there is clinical concern for acute ischemia/infarction, an MRI of the brain would be recommended for further evaluation.  4. No acute intracranial hemorrhage or extra-axial fluid.  No midline shift or significant mass effect.      LOCATION:  [facility]   Dictated by (CST): Stromberg, LeRoy, MD on 1/01/2025 at 5:32 AM     Finalized by (CST): Stromberg, LeRoy, MD on 1/01/2025 at 5:37 AM       US VENOUS DOPPLER LEG BILAT - DIAG IMG (CPT=93970)    Result Date: 12/31/2024  PROCEDURE:  US VENOUS DOPPLER LEG BILAT - DIAG IMG (CPT=93970)  COMPARISON:  None.  INDICATIONS:  Bilateral leg edema and swelling.  TECHNIQUE:  Real time, grey scale, and duplex ultrasound was used to evaluate the lower extremity venous system. B-mode two-dimensional images of the vascular structures, Doppler spectral analysis, and color flow.  Doppler imaging were performed.  The following veins were imaged bilaterally:  Common, deep, and superficial femoral, popliteal, sapheno-femoral junction, and posterior tibial veins.  PATIENT STATED HISTORY: (As transcribed by Technologist)     FINDINGS:  SAPHENOFEMORAL JUNCTION:  No reflux. THROMBI:  None visible. COMPRESSION:  Normal compressibility, phasicity, and augmentation. OTHER:  Negative.            CONCLUSION:  No DVT in the bilateral lower extremities.   LOCATION:  [facility]   Dictated by (CST): Jovani Gaspar MD on 12/31/2024 at 11:53 PM     Finalized by (CST): Jovani Gaspar MD on  12/31/2024 at 11:53 PM            ASSESSMENT:  Covid infection  Symptoms for about 2 days on admission  Not hypoxic, but at high risk for complications due to age and comorbidities  PD  Type 2 DM  BPH- stable. No UTI symptoms. UA did not seem c/w UTI    PLAN:  -continue paxlovid  -follow temps  -follow resp status  -ambulate as able  -ok to dc from ID standpoint with paxlovid to complete 5 day course, when ok with other providers on the case.     Discussed case with RN, patient and patient's wife.     Heather Mccormick PA-C    ID ATTENDING ADDENDUM     Pt seen an examined independently. Chart reviewed. Agree with above. Note has been reviewed by me and modified as needed.  Exam and Impression/ Recs as noted above.  D/w staff and with pt and family at the bedside  More than 50% of clinical time and 100% of the clinical decision making performed by me.    Callie Castanon MD         [1]   Allergies  Allergen Reactions    Radiology Contrast Iodinated Dyes HIVES and RASH    Iodine I 131 Tositumomab RASH     MRI contrast or IV dye     Other RASH     Iodine Dye Injectable    Iodine (Topical) RASH     Cerner Allergy Text Annotation: iodine; pt allergic to CT Contrast. Not topic betadine solution  Iodine Dye Injectable  Iodine Dye Injectable

## 2025-01-02 NOTE — PROGRESS NOTES
Pt. Co unable to void, bladder scan reveals <750ml. Notified hospitalist, ordered straight cath., had a difficulty inserting straight cath , md notified, consult to urology. Dr. Johnston notified. Claimed not oncall , to call [facility] urology. Notiifed dr. Khan. , returned call calimed he will message dr. Johnston to see pt. Tonight. , dr. Johnston returned call. Wanted to have this rn try to insert catheter with fr 18 coude. ; charge rn looking for fr. 18, was able to obtain fr. 16 but md wanted fr. 18. At 1830. Dr. Johnston called this rn to try to insert it with fr. 16 and let him know if we can't put it in . Fr. 16 coude inserted, obtained 1000 ml urine.   1830 notified dr. Johnston catheter is in and obtianed 1000 ml out. Md said he looked at pt. Chart and pt. Has history of urinary artificial sphincter , he will come in and check at the pt.   1900- dr. Johnston here, turn artificial spincheter off. To leave catheter tonight and will come back tomorrow.

## 2025-01-02 NOTE — PROGRESS NOTES
NURSING DISCHARGE NOTE    Discharged Home via Wheelchair.  Accompanied by Spouse  Belongings Taken by patient/family.    Patient cleared fro discharge by hospitalist Dr. Noel and consults-Dr. Castanon from ID, Urology PA Shanon. Discharge education reviewed with patient and spouse at bedside. Disease/medication information provided and discussed. Follow ups discussed. All questions and concerns addressed, patient and wife verbalized understanding. Discharge packet prepared and sent with patient. Home chemo med and remainder of Paxlovid doses sent home with patient per hospital policy. Flores catheter removed prior to discharge as ordered, no voiding trial needed per urology, patient to reactivate artifical sphincter tomorrow night per urology instructions. Brief in place. PIV and tele monitor removed. Patient ready for discharge.    Patient discharged to home with wife via wheelchair at 1410.

## 2025-01-02 NOTE — DISCHARGE INSTRUCTIONS
FOLLOW UP WITH YOUR UROLOGIST DR SALAMANCA FOR A CYSTOSCOPY TO EXAMINE YOUR ARTIFICIAL SPHINCTER  REACTIVATE YOUR ARTIFICIAL SPHINCTER FRIDAY NIGHT BEFORE BED  PRESS TO URINATE WITH EACH VOID WHEN IT IS ACTIVATED  THERE WILL BE LEAKAGE WHILE YOUR SPHINCTER IS DEACTIVATED

## 2025-01-02 NOTE — PLAN OF CARE
Problem: Diabetes/Glucose Control  Goal: Glucose maintained within prescribed range  Description: INTERVENTIONS:  - Monitor Blood Glucose as ordered  - Assess for signs and symptoms of hyperglycemia and hypoglycemia  - Administer ordered medications to maintain glucose within target range  - Assess barriers to adequate nutritional intake and initiate nutrition consult as needed  - Instruct patient on self management of diabetes  Outcome: Progressing     Problem: Patient/Family Goals  Goal: Patient/Family Long Term Goal  Description: Patient's Long Term Goal:   Resolution of COVID    Interventions:  - Paxlovid as ordered, symptomatic treatment, supportive care  - See additional Care Plan goals for specific interventions  Outcome: Progressing  Goal: Patient/Family Short Term Goal  Description: Patient's Short Term Goal:   01/01/2025 - rest and sleep    Interventions:   - cluster care, dim lights, keep room quiet, manage pain and discomfort  - See additional Care Plan goals for specific interventions  Outcome: Progressing     Problem: RESPIRATORY - ADULT  Goal: Achieves optimal ventilation and oxygenation  Description: INTERVENTIONS:  - Assess for changes in respiratory status  - Assess for changes in mentation and behavior  - Position to facilitate oxygenation and minimize respiratory effort  - Oxygen supplementation based on oxygen saturation or ABGs  - Provide Smoking Cessation handout, if applicable  - Encourage broncho-pulmonary hygiene including cough, deep breathe, Incentive Spirometry  - Assess the need for suctioning and perform as needed  - Assess and instruct to report SOB or any respiratory difficulty  - Respiratory Therapy support as indicated  - Manage/alleviate anxiety  - Monitor for signs/symptoms of CO2 retention  Outcome: Progressing

## 2025-01-02 NOTE — DISCHARGE SUMMARY
[facility]     Silvio Arellano Patient Status:  Inpatient    1950 MRN [id number]   Location [facility] 5NW-A Attending Dain Noel,    Hosp Day # 1 PCP Jabier Genao MD     Date of Admission: 2024  Date of Discharge:   2025    Discharge Disposition: Home or Self Care    Discharge Diagnosis:  COVID19 infection  Parkinson's disease  DMII  Depression  Essential HTN  BPH  Dyslipidemia     History of Present Illness: Silvio Arellano is a 74 year old male with history of Parkinson's disease, type 2 diabetes, depression, hypertension, hyperlipidemia, COURT presents emergency room with weakness.  Patient returning from a trip to [place] to visit family for [place] when they arrived patient was so weak he fell try getting off the plane and took 4 people to get him off the plane.  Patient started having cough about 2 days ago.  Patient is also been a little more disoriented.  No fevers, chills, nausea, vomiting, diarrhea.  No chest pain, shortness of breath.     Brief Synopsis:     # COVID19 infection  -Improved with Paxlovid  -ID following    #Normocytic anemia  #Thrombocytopenia  -Monitor     # Parkinson's disease  -Sinemet      # Type 2 diabetes  -SSI     # Depression  -SSRI     # Hypertension  -Controlled      # BPH with history of artificial urinary sphincter   -Flomax on hold while on paxlovid   -Urology following      # Hyperlipidemia  -Statin on hold while on paxlovid     #Disposition  -Stable for DC home     Lace+ Score: 67  59-90 High Risk  29-58 Medium Risk  0-28   Low Risk       TCM Follow-Up Recommendation:  LACE > 58: High Risk of readmission after discharge from the hospital.  **Certification    Admission date was 2024.  Inpatient stay was shorter than expected.  Patient's COVID-19 was initially serious enough to expect a more lengthy hospitalization but patient improved faster than expected.                 Procedures during hospitalization:    None    Incidental or significant findings and recommendations (brief descriptions):  None    Lab/Test results pending at Discharge:   None    Consultants:  ID  Urology    Discharge Medication List:     Discharge Medications        START taking these medications        Instructions Prescription details   nirmatrelvir-ritonavir 300-100 MG Tbpk  Commonly known as: Paxlovid  Notes to patient: Complete remainder of doses in box      Take two nirmatrelvir tablets (300mg) with one ritonavir tablet (100mg) together twice daily for 5 days.   Stop taking on: January 5, 2025  Refills: 0            CHANGE how you take these medications        Instructions Prescription details   Crestor 20 MG Tabs  Generic drug: rosuvastatin  Start taking on: January 10, 2025  What changed: These instructions start on January 10, 2025. If you are unsure what to do until then, ask your doctor or other care provider.      Take 1 tablet (20 mg total) by mouth daily.   Refills: 0     lisinopril 10 MG Tabs  Commonly known as: Zestril  What changed: Another medication with the same name was removed. Continue taking this medication, and follow the directions you see here.      Take 1 tablet (10 mg total) by mouth every morning.   Quantity: 90 tablet  Refills: 0     tamsulosin 0.4 MG Caps  Commonly known as: Flomax  Start taking on: January 8, 2025  What changed: These instructions start on January 8, 2025. If you are unsure what to do until then, ask your doctor or other care provider.      Take 1 capsule (0.4 mg total) by mouth nightly.   Refills: 0            CONTINUE taking these medications        Instructions Prescription details   Abiraterone Acetate 250 MG Tabs      Take 1,000 mg by mouth before breakfast.   Refills: 0     Accu-Chek Guide Strp      1 each by Finger stick route daily. May dispense any glucometer, strips and lancets that insurance will cover DX: E11.9. Test once daily   Quantity: 100 strip  Refills: 1     aspirin 81 MG Tbec       Take 1 tablet (81 mg total) by mouth daily.   Quantity: 30 tablet  Refills: 0     carbidopa-levodopa  MG Tabs  Commonly known as: SINEMET      Take 1 tablet by mouth 3 (three) times daily before meals.   Refills: 0     diphenhydrAMINE HCl 25 MG Tabs  Commonly known as: Benadryl Allergy      Take 50mg one hour prior to contrast administration   Quantity: 2 tablet  Refills: 0     ergocalciferol 1.25 MG (00697 UT) Caps  Commonly known as: ERGOCALCIFEROL      Take by mouth daily.   Refills: 0     escitalopram 20 MG Tabs  Commonly known as: Lexapro      Take 1 tablet by mouth once daily   Quantity: 90 tablet  Refills: 0     glimepiride 2 MG Tabs  Commonly known as: Amaryl      TAKE 1 TABLET BY MOUTH BEFORE BREAKFAST   Quantity: 90 tablet  Refills: 0     metFORMIN  MG Tb24  Commonly known as: Glucophage XR      Take 2 tablets by mouth once daily   Quantity: 180 tablet  Refills: 0     Mirabegron ER 25 MG Tb24  Commonly known as: MYRBETRIQ      1 tablet (25 mg total) daily.   Refills: 0     PATIENT SUPPLIED MEDICATION      Abiraterone acetate 250mg tab = take four tabs daily on empty stomach -one hr before foods or two hrs after food [facility]   Refills: 0     prednisoLONE 5 MG Tabs  Commonly known as: Orapred      Take 1 tablet (5 mg total) by mouth daily.   Refills: 0            STOP taking these medications      Accu-Chek Guide w/Device Kit        fluocinonide 0.05 % Crea  Commonly known as: Lidex                 [place]    Follow-up appointment:   Jabier Genao MD  [address] [place] [address] [address]  [facility] [address] [phone]    Follow up  1-2 weeks    Georgina Galarza MD  [address]  [facility]   [facility] [address]  [phone]    Schedule an appointment as soon as possible for a visit  cystoscopy    Appointments for Next 30 Days 1/2/2025 - 2/1/2025      None            Vital signs:  Temp:  [98.4 °F (36.9 °C)-100.4 °F (38 °C)] 98.4 °F (36.9 °C)  Pulse:  [80-94] 84  Resp:   [14-29] 18  BP: (140-166)/(72-86) 151/72  SpO2:  [94 %-99 %] 98 %    Physical Exam:    General: No acute distress   Lungs: clear to auscultation  Cardiovascular: S1, S2  Abdomen: Soft    -----------------------------------------------------------------------------------------------  PATIENT DISCHARGE INSTRUCTIONS: See electronic chart    Dain Noel DO    Total time spent on discharge plannin minutes     The  [employer] Cures Act makes medical notes like these available to patients in the interest of transparency. Please be advised this is a medical document. Medical documents are intended to carry relevant information, facts as evident, and the clinical opinion of the practitioner. The medical note is intended as peer to peer communication and may appear blunt or direct. It is written in medical language and may contain abbreviations or verbiage that are unfamiliar.

## 2025-01-02 NOTE — CM/SW NOTE
01/02/25 1100   CM/SW Referral Data   Referral Source Social Work (self-referral)   Reason for Referral Discharge planning   Informant Patient;Spouse/Significant Other;EMR;Clinical Staff Member   Medical Hx   Does patient have an established PCP? Yes   Patient Info   Patient's Current Mental Status at Time of Assessment Alert;Oriented   Patient's Home Environment House   Patient lives with Spouse/Significant other   Discharge Needs   Anticipated D/C needs No anticipated discharge needs;Outpatient therapy     HOME SITUATION  Type of Home: House  Home Layout: Two level  Stairs to Enter : 2 (thru garage)        Stairs to Bedroom: 13         Lives With: Spouse    Drives: No      Prior Level of [place]: Per patient, he ambulates using the RW at home, wife assists as needed but patient reports he is able to get dress by himself.  Per RN, wife is COVID+ too.  Patient and wife was in [place] for [place], patient reported they visited daughter.   (Per PT evaluation)      Patient is a 73 y/o male who admitted with Confusion, Weakness generalized, COVID-19.   Anticipated therapy need: Home with Home Healthcare    Met with patient and wife at bedside to discuss discharge planning. Discussed HH, they politely declined because patient has been going to [facility] outpatient PT/OT and plans to resume after discharge. He is hopeful to discharge later today. Updated RN.    BIRD/UZMA to remain available for support and/or discharge planning.    MARLEN Avitia  Discharge Planner     Telemetry box recieved. Patient assigned telemetry box #: [id number].

## 2025-01-06 ENCOUNTER — PATIENT OUTREACH (OUTPATIENT)
Dept: CASE MANAGEMENT | Age: 75
End: 2025-01-06

## 2025-01-06 NOTE — PROGRESS NOTES
Transitional Care Management   Discharge Date: 1/2/25  Contact Date: 1/6/2025    Nurse care manager spoke briefly with the patient's spouse. The spouse requested to call the nurse care manager back. Nurse care  information was provided.

## 2025-01-13 DIAGNOSIS — Z86.73 HISTORY OF STROKE: ICD-10-CM

## 2025-01-13 DIAGNOSIS — E11.59 HYPERTENSION ASSOCIATED WITH DIABETES (HCC): ICD-10-CM

## 2025-01-13 DIAGNOSIS — I15.2 HYPERTENSION ASSOCIATED WITH DIABETES (HCC): ICD-10-CM

## 2025-01-13 RX ORDER — LISINOPRIL 10 MG/1
10 TABLET ORAL EVERY MORNING
Qty: 90 TABLET | Refills: 0 | Status: SHIPPED | OUTPATIENT
Start: 2025-01-13

## 2025-01-13 NOTE — TELEPHONE ENCOUNTER
Medication requested: lisinopril 10 MG Oral Tab   And tamsulosin 0.4 MG Oral Cap     Is patient requesting 30 or 90 day supply:  90    Pharmacy name/location:    [facility] [address] [place] [address] [phone], [phone]   [address] [place] [id number]   Phone: [phone] Fax: [phone]   Hours: Not open 24 hours       LOV:  12/16/2024    Is the patient due for appointment: no (if so, please schedule)    Additional notes:  no

## 2025-01-13 NOTE — TELEPHONE ENCOUNTER
Last time medication was refilled 05/16/2024  Last office visit  12/16/2024  Next office visit due/scheduled   Future Appointments   Date Time Provider Department Center   1/21/2025 12:00 PM Jabier Genao MD [facility] [address]     Medication failed protocol.

## 2025-01-20 NOTE — PROGRESS NOTES
Subjective:   Silvio Arellano is a 75 year old male who presents for hospital follow up.   He was discharged from [facility] to Home or Self Care  Admission Date: 12/31/24   Discharge Date: 1/2/25  Hospital Discharge Diagnosis: covid infection    Interactive contact within 2 business days post discharge first initiated on Date: 1/6/2025    During the visit, the following was completed:  Obtained and reviewed discharge summary, continuity of care documents, and Hospitalist notes  Reviewed Labs (CBC, CMP)    HPI:   Date of Admission: 12/31/2024  Date of Discharge:   1/2/2025     A.P  COVID19 infection- improved. Back to baseline health    Parkinson's disease- stable. Cont current med plan    Diabetes mellitus with complication (HCC) -controlled. Cont current med therapy    Essential HTN-controlled. Cont current med therapy         History of Present Illness:   Silvio Arellano is a 74 year old male with history of Parkinson's disease, type 2 diabetes, depression, hypertension, hyperlipidemia, COURT presented with weakness.  Patient returning from a trip to [place] to visit family for [place] when they arrived patient was so weak he fell try getting off the plane and took 4 people to get him off the plane.    Patient is also been a little more disoriented.  No fevers, chills, nausea, vomiting, diarrhea.  No chest pain, shortness of breath.      Found to have covid and treated, seen by ID and pt responded well to paxlovid    PAST MEDICAL, SOCIAL, FAMILY HISTORIES REVIEWED WITH PT    History/Other:   Current Medications:  Medication Reconciliation:  I am aware of an inpatient discharge within the last 30 days.  The discharge medication list has been reconciled with the patient's current medication list and reviewed by me. See medication list for additions of new medication, and changes to current doses of medications and discontinued medications.  No outpatient medications have been marked as taking for the 1/21/25 encounter  (Appointment) with Jabier Genao MD.       Review of Systems  A comprehensive 10 point review of systems was completed.     Pertinent positives and negatives noted in the HPI.      Objective:   Physical Exam  General: No acute distress. Alert and oriented x 3.  HEENT: Normocephalic atraumatic. Moist mucous membranes  Neck: No lymphadenopathy.   Respiratory: Clear to auscultation bilaterally. No wheezes. No rhonchi.  Cardiovascular: S1, S2. Regular rate and rhythm.   Abdomen: Soft,  no pain.  nontender, nondistended.  Positive bowel sounds. .  Extremities: No edema or cyanosis.  Integument: No rashes or lesions.        There were no vitals taken for this visit. Estimated body mass index is 34.45 kg/m² as calculated from the following:    Height as of 12/16/24: 6' (1.829 m).    Weight as of 1/1/25: 254 lb (115.2 kg).    Assessment & Plan:   There are no diagnoses linked to this encounter.    {Tip  Follow Up:8307}  No follow-ups on file.

## 2025-01-21 ENCOUNTER — OFFICE VISIT (OUTPATIENT)
Dept: INTERNAL MEDICINE CLINIC | Facility: CLINIC | Age: 75
End: 2025-01-21
Payer: MEDICARE

## 2025-01-21 DIAGNOSIS — Z00.00 ANNUAL PHYSICAL EXAM: Primary | ICD-10-CM

## 2025-01-30 ENCOUNTER — OFFICE VISIT (OUTPATIENT)
Dept: INTERNAL MEDICINE CLINIC | Facility: CLINIC | Age: 75
End: 2025-01-30
Payer: MEDICARE

## 2025-01-30 VITALS
HEIGHT: 72 IN | BODY MASS INDEX: 32.91 KG/M2 | DIASTOLIC BLOOD PRESSURE: 80 MMHG | OXYGEN SATURATION: 98 % | SYSTOLIC BLOOD PRESSURE: 138 MMHG | WEIGHT: 243 LBS | HEART RATE: 70 BPM | TEMPERATURE: 98 F | RESPIRATION RATE: 16 BRPM

## 2025-01-30 DIAGNOSIS — H61.20 CERUMEN IN AUDITORY CANAL ON EXAMINATION: ICD-10-CM

## 2025-01-30 DIAGNOSIS — F34.1 DYSTHYMIA: ICD-10-CM

## 2025-01-30 DIAGNOSIS — Z23 NEED FOR INFLUENZA VACCINATION: ICD-10-CM

## 2025-01-30 DIAGNOSIS — E11.9 TYPE 2 DIABETES MELLITUS WITHOUT COMPLICATION, WITHOUT LONG-TERM CURRENT USE OF INSULIN (HCC): Primary | ICD-10-CM

## 2025-01-30 DIAGNOSIS — H90.3 SENSORINEURAL HEARING LOSS (SNHL) OF BOTH EARS: ICD-10-CM

## 2025-01-30 DIAGNOSIS — Z85.46 HISTORY OF PROSTATE CANCER: ICD-10-CM

## 2025-01-30 PROBLEM — R53.1 WEAKNESS GENERALIZED: Status: RESOLVED | Noted: 2025-01-01 | Resolved: 2025-01-30

## 2025-01-30 PROBLEM — U07.1 COVID-19: Status: RESOLVED | Noted: 2025-01-01 | Resolved: 2025-01-30

## 2025-01-30 PROCEDURE — 99214 OFFICE O/P EST MOD 30 MIN: CPT | Performed by: NURSE PRACTITIONER

## 2025-01-30 PROCEDURE — 69209 REMOVE IMPACTED EAR WAX UNI: CPT | Performed by: NURSE PRACTITIONER

## 2025-01-30 PROCEDURE — G2211 COMPLEX E/M VISIT ADD ON: HCPCS | Performed by: NURSE PRACTITIONER

## 2025-01-30 PROCEDURE — G0008 ADMIN INFLUENZA VIRUS VAC: HCPCS | Performed by: NURSE PRACTITIONER

## 2025-01-30 PROCEDURE — 90662 IIV NO PRSV INCREASED AG IM: CPT | Performed by: NURSE PRACTITIONER

## 2025-01-30 RX ORDER — METFORMIN HYDROCHLORIDE 750 MG/1
1500 TABLET, EXTENDED RELEASE ORAL DAILY
Qty: 180 TABLET | Refills: 0 | Status: SHIPPED | OUTPATIENT
Start: 2025-01-30

## 2025-01-30 RX ORDER — GLIMEPIRIDE 2 MG/1
2 TABLET ORAL
Qty: 90 TABLET | Refills: 0 | Status: SHIPPED | OUTPATIENT
Start: 2025-01-30

## 2025-01-30 RX ORDER — ESCITALOPRAM OXALATE 20 MG/1
20 TABLET ORAL DAILY
Qty: 90 TABLET | Refills: 0 | Status: SHIPPED | OUTPATIENT
Start: 2025-01-30

## 2025-01-30 NOTE — PROGRESS NOTES
HPI:   Silvio Arellano is a 75 year old male who presents for recheck of his diabetes and other medical conditions.   Patient fasting sugar is unknown, has not been checking, Taking medication as prescribed. Recently admitted d/t COVID. His wife reports he has swelling to bilateral legs. No difficulty with breathing, new cough, chest pain. He is ambulating with walker, going to [facility] twice weekly for rehab. Recently taken off cancer medication for prostate cancer and has follow up in 2 months. He is going to audiologist tomorrow and needs his ears flushed.     Wt Readings from Last 6 Encounters:   01/30/25 243 lb (110.2 kg)   01/01/25 254 lb (115.2 kg)   12/16/24 250 lb (113.4 kg)   06/24/24 232 lb (105.2 kg)   04/26/24 232 lb (105.2 kg)   03/05/24 232 lb (105.2 kg)     Body mass index is 32.96 kg/m².     No components found for: \"HGBA1C\"  HEMOGLOBIN A1C (%)   Date Value   05/11/2021 8.3 (A)   10/12/2020 9.0 (A)   07/31/2017 6.2 (A)     HgbA1C (%)   Date Value   01/01/2025 7.7 (H)   06/24/2024 6.4 (H)   03/05/2024 6.0 (H)     Cholesterol, Total (mg/dL)   Date Value   06/24/2024 96   03/05/2024 86   10/26/2023 165   12/23/2013 185   01/29/2013 197     CHOLESTEROL, TOTAL (mg/dL)   Date Value   01/12/2016 207 (H)     Cholesterol (mg/dL)   Date Value   10/12/2020 154.00   05/21/2020 117.00   06/08/2018 161     HDL Cholesterol (mg/dL)   Date Value   06/24/2024 42   03/05/2024 35 (L)   10/26/2023 37 (L)   12/23/2013 34 (L)   01/29/2013 33 (L)     Direct HDL (mg/dL)   Date Value   10/12/2020 38 (L)   05/21/2020 32 (L)   06/08/2018 40 (L)     LDL Cholesterol Calc (mg/dL)   Date Value   12/23/2013 114 (H)   01/29/2013 133 (H)     LDL Cholesterol (mg/dL)   Date Value   06/24/2024 19   03/05/2024 31   10/26/2023 94     Calculated LDL (mg/dL)   Date Value   10/12/2020 58   05/21/2020 43   06/08/2018 88     Triglycerides (mg/dL)   Date Value   12/23/2013 184 (H)   01/29/2013 156 (H)     TRIGLYCERIDES (mg/dL)   Date Value    01/12/2016 240 (H)     AST (SGOT) (IU/L)   Date Value   08/13/2015 22   12/23/2013 22   01/29/2013 19     AST (U/L)   Date Value   12/31/2024 14   06/24/2024 13 (L)   06/12/2024 11   06/05/2024 14   03/05/2024 16   10/12/2020 28   06/08/2018 23   09/27/2017 24     ALT (SGPT) (IU/L)   Date Value   08/13/2015 31   12/23/2013 21   01/29/2013 27     ALT (U/L)   Date Value   12/31/2024 15   06/24/2024 16   06/12/2024 14   06/05/2024 15   03/05/2024 22   10/12/2020 49 (H)   05/21/2020 66 (H)   06/08/2018 43      Malb/Cre Calc Ratio   Date Value Ref Range Status   10/12/2020 243.2 (H) 0.0 - 29.0 ug/mg Final     Malb/Cre Calc   Date Value Ref Range Status   03/05/2024 355.0 (H) <=30.0 ug/mg Final     Comment:     <30 ug/mg creatinine       Normal     ug/mg creatinine   Microalbuminuria   >300 ug/mg creatinine      Albuminuria       10/20/2022 154.3 (H) <=30.0 ug/mg Final     Comment:     <30 ug/mg creatinine       Normal     ug/mg creatinine   Microalbuminuria   >300 ug/mg creatinine      Albuminuria           Current Outpatient Medications   Medication Sig Dispense Refill    glimepiride 2 MG Oral Tab Take 1 tablet (2 mg total) by mouth before breakfast. 90 tablet 0    escitalopram 20 MG Oral Tab Take 1 tablet (20 mg total) by mouth daily. 90 tablet 0    metFORMIN  MG Oral Tablet 24 Hr Take 2 tablets (1,500 mg total) by mouth daily. 180 tablet 0    lisinopril 10 MG Oral Tab Take 1 tablet (10 mg total) by mouth every morning. 90 tablet 0    rosuvastatin (CRESTOR) 20 MG Oral Tab Take 1 tablet (20 mg total) by mouth daily.      tamsulosin 0.4 MG Oral Cap Take 1 capsule (0.4 mg total) by mouth nightly.      Abiraterone Acetate 250 MG Oral Tab Take 1,000 mg by mouth before breakfast.      prednisoLONE 5 MG Oral Tab Take 1 tablet (5 mg total) by mouth daily.      PATIENT SUPPLIED MEDICATION Abiraterone acetate 250mg tab = take four tabs daily on empty stomach -one hr before foods or two hrs [facility]      carbidopa-levodopa  MG Oral Tab Take 1 tablet by mouth 3 (three) times daily before meals.      diphenhydrAMINE HCl (BENADRYL ALLERGY) 25 MG Oral Tab Take 50mg one hour prior to contrast administration 2 tablet 0    Mirabegron ER 25 MG Oral Tablet 24 Hr 1 tablet (25 mg total) daily.      ergocalciferol 1.25 MG (43889 UT) Oral Cap Take by mouth daily.      Glucose Blood (ACCU-CHEK GUIDE) In Vitro Strip 1 each by Finger stick route daily. May dispense any glucometer, strips and lancets that insurance will cover DX: E11.9. Test once daily 100 strip 1    aspirin 81 MG Oral Tab EC Take 1 tablet (81 mg total) by mouth daily. 30 tablet 0      Past Medical History:    Anesthesia complication    slow to awaken    Brain mass    Cataract    Depression    Diabetes (HCC)    Essential hypertension    Exposure to radiation    24 radiation of prostate prior to seed implantation    Hearing impaired person, bilateral    High blood pressure    High cholesterol    Lung mass    Obesity    Obstructive sleep apnea (adult) (pediatric)    COURT (obstructive sleep apnea)    AHI 76 RDI 85 REM AHI 51 Supine AHI 77 non-supine AHI 60 Sao2 Gregg 78%     COURT on CPAP    6-20 [facility]    Prostate cancer (HCC)    s/p SEEDS 5/2/13    Sleep apnea    Status post implantation of artificial urinary sphincter    Stroke (HCC)    Tobacco dependence    Visual impairment      Past Surgical History:   Procedure Laterality Date    Colonoscopy  1/9/13= Diverticulosis, Hemorrhoids    Repeat 2023    Other      anterior cervical neck sx  - metal    Other surgical history  2/20/13    PNBx - Dr. Berry    Other surgical history  5/2/13    Seeds Implant - [facility]    Remv cataract extracap,insert lens  12/26/2012    Procedure: LEFT PHACOEMULSIFICATION OF CATARACT WITH INTRAOCULAR LENS IMPLANT [id number];  Surgeon: Nitin Ang MD;  Location[place] [facility] [place] [facility]    Remv cataract extracap,insert lens  11/28/2012     Procedure: RIGHT PHACOEMULSIFICATION OF CATARACT WITH INTRAOCULAR LENS IMPLANT [id number];  Surgeon: Nitin Ang MD;  [place] [facility] [place] [facility]    Tonsillectomy        Social History:   Social History     Socioeconomic History    Marital status:    Tobacco Use    Smoking status: Former     Current packs/day: 0.00     Average packs/day: 0.5 packs/day for 30.0 years (15.0 ttl pk-yrs)     Types: Cigarettes     Start date: 5/8/1984     Quit date: 5/8/2014     Years since quitting: 10.7    Smokeless tobacco: Never   Vaping Use    Vaping status: Never Used   Substance and Sexual Activity    Alcohol use: Not Currently     Alcohol/week: 2.0 standard drinks of alcohol     Types: 2 Cans of beer per week    Drug use: No   Other Topics Concern    Caffeine Concern Yes     Comment: coffee 1-2 daily    Exercise No   Social History Narrative    : 1979    Children: 2    Exercise: walks, limited    Employment: [email or web address]    Caffeine intake: 2 coffee/d         Social Drivers of Health     Food Insecurity: No Food Insecurity (1/1/2025)    Food Insecurity     Food Insecurity: Never true   Transportation Needs: No Transportation Needs (1/1/2025)    Transportation Needs     Lack of Transportation: No   Housing Stability: Low Risk  (1/1/2025)    Housing Stability     Housing Instability: No          REVIEW OF SYSTEMS:   GENERAL HEALTH: feels well otherwise  SKIN: denies any unusual skin lesions or rashes  RESPIRATORY: denies shortness of breath with exertion  CARDIOVASCULAR: denies chest pain on exertion, +swelling  GI: denies abdominal pain and denies heartburn  NEURO: denies headaches  ENT: decreased hearing bilaterally    EXAM:   /80   Pulse 70   Temp 98 °F (36.7 °C)   Resp 16   Ht 6' (1.829 m)   Wt 243 lb (110.2 kg)   SpO2 98%   BMI 32.96 kg/m²   GENERAL: well developed, well nourished,in no apparent distress  SKIN: no rashes,no suspicious lesions  NECK: supple,no adenopathy,no bruits  LUNGS:  clear to auscultation  CARDIO: RRR without murmur  GI: good BS's,no masses, HSM or tenderness  EXTREMITIES: no cyanosis, clubbing or 1+ edema bilaterally  ENT: cerumen noted      ASSESSMENT AND PLAN:   Silvio Arellano is a 75 year old male who presents for a recheck of his diabetes.   Encounter Diagnoses   Name Primary?    Type 2 diabetes mellitus without complication, without long-term current use of insulin (HCC)     Dysthymia     Need for influenza vaccination Yes    Cerumen in auditory canal on examination     History of prostate cancer- Meghan 4+4 2013 s/p radiation Dr. Medina     Sensorineural hearing loss (SNHL) of both ears       Tolerated bilateral ear flushing with wax removal and curette to removal moderate-significant cerumen removal   Diabetic control is fair control  Recommendations are: continue present meds,   Check HgbA1C, check fasting lipids and CMP in 3 months  Lose wgt with carbohydrate controlled diet and increase activity as tolerated  Low salt diet to improve swelling in bilateral lower extremities. Discussed with wife, no more than 2500 mg daily  Refer to Ophthamology, check feet daily.  Follow up with onocologist and audiologist as scheduled    The patient indicates understanding of these issues and agrees to the plan.  Return in about 3 months (around 4/30/2025) for Diabetes Check.

## 2025-02-23 DIAGNOSIS — Z85.46 HISTORY OF PROSTATE CANCER: Primary | ICD-10-CM

## 2025-02-23 RX ORDER — TAMSULOSIN HYDROCHLORIDE 0.4 MG/1
0.4 CAPSULE ORAL NIGHTLY
Qty: 30 CAPSULE | Refills: 0 | Status: SHIPPED | OUTPATIENT
Start: 2025-02-23

## 2025-02-23 NOTE — TELEPHONE ENCOUNTER
Last time medication was refilled: 1/8/25  Next office visit due/scheduled: no apt  Last office visit: 1/30/25  Last Labs: 6/24/24

## 2025-04-01 NOTE — PROGRESS NOTES
Silvio Arellano is a 75 year old male.  HPI:   His wife reports he has swelling to bilateral legs. No difficulty with breathing, new cough, chest pain. Pt's wife states pt sits in the chair most of the time.    Swelling  This is a chronic problem. The current episode started more than 1 month ago. The problem occurs constantly. The problem has been unchanged. Pertinent negatives include no abdominal pain, anorexia, arthralgias, change in bowel habit, chest pain, chills, congestion, coughing, diaphoresis, fatigue, fever, headaches, joint swelling, myalgias, nausea, neck pain, numbness, rash, sore throat, swollen glands, urinary symptoms, vertigo, visual change, vomiting or weakness. Exacerbated by: prolonged sitting. He has tried nothing for the symptoms.      DM- adherent to medication. Does not check FBS. Denies hypoglycemia episodes. Denies numbness/tingling in feet.  Current Outpatient Medications   Medication Sig Dispense Refill    TAMSULOSIN 0.4 MG Oral Cap TAKE 1 CAPSULE BY MOUTH NIGHTLY AT BEDTIME 30 capsule 0    glimepiride 2 MG Oral Tab Take 1 tablet (2 mg total) by mouth before breakfast. 90 tablet 0    escitalopram 20 MG Oral Tab Take 1 tablet (20 mg total) by mouth daily. 90 tablet 0    metFORMIN  MG Oral Tablet 24 Hr Take 2 tablets (1,500 mg total) by mouth daily. 180 tablet 0    lisinopril 10 MG Oral Tab Take 1 tablet (10 mg total) by mouth every morning. 90 tablet 0    rosuvastatin (CRESTOR) 20 MG Oral Tab Take 1 tablet (20 mg total) by mouth daily.      Abiraterone Acetate 250 MG Oral Tab Take 1,000 mg by mouth before breakfast.      prednisoLONE 5 MG Oral Tab Take 1 tablet (5 mg total) by mouth daily.      carbidopa-levodopa  MG Oral Tab Take 1 tablet by mouth 3 (three) times daily before meals.      diphenhydrAMINE HCl (BENADRYL ALLERGY) 25 MG Oral Tab Take 50mg one hour prior to contrast administration 2 tablet 0    Mirabegron ER 25 MG Oral Tablet 24 Hr 1 tablet (25 mg total) daily.       ergocalciferol 1.25 MG (62309 UT) Oral Cap Take by mouth daily.      Glucose Blood (ACCU-CHEK GUIDE) In Vitro Strip 1 each by Finger stick route daily. May dispense any glucometer, strips and lancets that insurance will cover DX: E11.9. Test once daily 100 strip 1    aspirin 81 MG Oral Tab EC Take 1 tablet (81 mg total) by mouth daily. 30 tablet 0    PATIENT SUPPLIED MEDICATION Abiraterone acetate 250mg tab = take four tabs daily on empty stomach -one hr before foods or two hrs [facility] (Patient not taking: Reported on 4/2/2025)        Past Medical History:    Anesthesia complication    slow to awaken    Brain mass    Cataract    Depression    Diabetes (HCC)    Essential hypertension    Exposure to radiation    24 radiation of prostate prior to seed implantation    Hearing impaired person, bilateral    High blood pressure    High cholesterol    Lung mass    Obesity    Obstructive sleep apnea (adult) (pediatric)    COURT (obstructive sleep apnea)    AHI 76 RDI 85 REM AHI 51 Supine AHI 77 non-supine AHI 60 Sao2 Gregg 78%     COURT on CPAP    6-20 [facility]    Prostate cancer (HCC)    s/p SEEDS 5/2/13    Sleep apnea    Status post implantation of artificial urinary sphincter    Stroke ([facility])    Tobacco dependence    Visual impairment      Social History:  Social History     Socioeconomic History    Marital status:    Tobacco Use    Smoking status: Former     Current packs/day: 0.00     Average packs/day: 0.5 packs/day for 30.0 years (15.0 ttl pk-yrs)     Types: Cigarettes     Start date: 5/8/1984     Quit date: 5/8/2014     Years since quitting: 10.9    Smokeless tobacco: Never   Vaping Use    Vaping status: Never Used   Substance and Sexual Activity    Alcohol use: Not Currently     Alcohol/week: 2.0 standard drinks of alcohol     Types: 2 Cans of beer per week    Drug use: No   Other Topics Concern    Caffeine Concern Yes     Comment: coffee 1-2 daily    Exercise No   Social History  Narrative    : 1979    Children: 2    Exercise: walks, limited    Employment: [employer]    Caffeine intake: 2 coffee/d         Social Drivers of Health     Food Insecurity: No Food Insecurity (1/1/2025)    Food Insecurity     Food Insecurity: Never true   Transportation Needs: No Transportation Needs (1/1/2025)    Transportation Needs     Lack of Transportation: No   Housing Stability: Low Risk  (1/1/2025)    Housing Stability     Housing Instability: No        REVIEW OF SYSTEMS:   Review of Systems   Constitutional:  Negative for chills, diaphoresis, fatigue and fever.   HENT:  Negative for congestion and sore throat.    Respiratory: Negative.  Negative for cough.    Cardiovascular:  Positive for leg swelling. Negative for chest pain.   Gastrointestinal: Negative.  Negative for abdominal pain, anorexia, change in bowel habit, nausea and vomiting.   Musculoskeletal: Negative.  Negative for arthralgias, joint swelling, myalgias and neck pain.   Skin: Negative.  Negative for rash.   Neurological: Negative.  Negative for vertigo, weakness, numbness and headaches.   Psychiatric/Behavioral: Negative.           EXAM:   /66   Pulse 68   Temp 97.5 °F (36.4 °C)   Resp 16   Wt 247 lb (112 kg)   SpO2 99%   BMI 33.50 kg/m²   Physical Exam  Vitals and nursing note reviewed.   Constitutional:       Appearance: Normal appearance. He is normal weight.   Cardiovascular:      Rate and Rhythm: Normal rate and regular rhythm.      Pulses:           Dorsalis pedis pulses are 1+ on the right side and 1+ on the left side.        Posterior tibial pulses are 1+ on the right side and 1+ on the left side.      Heart sounds: Normal heart sounds.   Pulmonary:      Effort: Pulmonary effort is normal. No respiratory distress.      Breath sounds: Normal breath sounds. No stridor. No wheezing, rhonchi or rales.   Chest:      Chest wall: No tenderness.   Musculoskeletal:         General: Swelling present.      Right lower  le+ Edema present.      Left lower le+ Edema present.   Skin:     General: Skin is warm and dry.      Capillary Refill: Capillary refill takes less than 2 seconds.   Neurological:      General: No focal deficit present.      Mental Status: He is alert and oriented to person, place, and time. Mental status is at baseline.   Psychiatric:         Mood and Affect: Mood normal.         Behavior: Behavior normal.         Thought Content: Thought content normal.         Judgment: Judgment normal.          Bilateral barefoot skin diabetic exam is normal, visualized feet and the appearance is normal.  Bilateral monofilament/sensation of both feet is normal.  Pulsation pedal pulse exam of both lower legs/feet is normal as well.     ASSESSMENT AND PLAN:   Diagnoses and all orders for this visit:    Dependent edema  Peripheral arterial disease   -advised compression stockings, doing heel pumps, elevate legs, decrease salt intake, increase ambulation.  Diabetes mellitus with complication (HCC)   -reminded to complete labs that ordered in the system   -reminded to scheduled with ophthalmologist.   Requested Prescriptions      No prescriptions requested or ordered in this encounter         The patient indicates understanding of these issues and agrees to the plan.  The patient is asked to return if symptoms persist or worsen.

## 2025-04-02 ENCOUNTER — OFFICE VISIT (OUTPATIENT)
Dept: INTERNAL MEDICINE CLINIC | Facility: CLINIC | Age: 75
End: 2025-04-02
Payer: MEDICARE

## 2025-04-02 ENCOUNTER — LAB ENCOUNTER (OUTPATIENT)
Dept: LAB | Age: 75
End: 2025-04-02
Attending: NURSE PRACTITIONER
Payer: MEDICARE

## 2025-04-02 VITALS
WEIGHT: 247 LBS | OXYGEN SATURATION: 99 % | RESPIRATION RATE: 16 BRPM | HEART RATE: 68 BPM | SYSTOLIC BLOOD PRESSURE: 120 MMHG | BODY MASS INDEX: 34 KG/M2 | DIASTOLIC BLOOD PRESSURE: 66 MMHG | TEMPERATURE: 98 F

## 2025-04-02 DIAGNOSIS — I73.9 PERIPHERAL ARTERIAL DISEASE: ICD-10-CM

## 2025-04-02 DIAGNOSIS — E11.8 DIABETES MELLITUS WITH COMPLICATION (HCC): ICD-10-CM

## 2025-04-02 DIAGNOSIS — R60.9 DEPENDENT EDEMA: Primary | ICD-10-CM

## 2025-04-02 DIAGNOSIS — F34.1 DYSTHYMIA: ICD-10-CM

## 2025-04-02 DIAGNOSIS — E11.9 TYPE 2 DIABETES MELLITUS WITHOUT COMPLICATION, WITHOUT LONG-TERM CURRENT USE OF INSULIN (HCC): ICD-10-CM

## 2025-04-02 LAB
ALBUMIN SERPL-MCNC: 4.4 G/DL (ref 3.2–4.8)
ALBUMIN/GLOB SERPL: 1.8 {RATIO} (ref 1–2)
ALP LIVER SERPL-CCNC: 60 U/L
ALT SERPL-CCNC: 29 U/L
ANION GAP SERPL CALC-SCNC: 5 MMOL/L (ref 0–18)
AST SERPL-CCNC: 22 U/L (ref ?–34)
BILIRUB SERPL-MCNC: 0.4 MG/DL (ref 0.2–1.1)
BUN BLD-MCNC: 22 MG/DL (ref 9–23)
CALCIUM BLD-MCNC: 9.4 MG/DL (ref 8.7–10.6)
CHLORIDE SERPL-SCNC: 106 MMOL/L (ref 98–112)
CO2 SERPL-SCNC: 24 MMOL/L (ref 21–32)
CREAT BLD-MCNC: 1.1 MG/DL
CREAT UR-SCNC: 102.8 MG/DL
EGFRCR SERPLBLD CKD-EPI 2021: 70 ML/MIN/1.73M2 (ref 60–?)
EST. AVERAGE GLUCOSE BLD GHB EST-MCNC: 194 MG/DL (ref 68–126)
FASTING STATUS PATIENT QL REPORTED: NO
GLOBULIN PLAS-MCNC: 2.5 G/DL (ref 2–3.5)
GLUCOSE BLD-MCNC: 187 MG/DL (ref 70–99)
HBA1C MFR BLD: 8.4 % (ref ?–5.7)
MICROALBUMIN UR-MCNC: 4.3 MG/DL
MICROALBUMIN/CREAT 24H UR-RTO: 41.8 UG/MG (ref ?–30)
OSMOLALITY SERPL CALC.SUM OF ELEC: 288 MOSM/KG (ref 275–295)
POTASSIUM SERPL-SCNC: 4.2 MMOL/L (ref 3.5–5.1)
PROT SERPL-MCNC: 6.9 G/DL (ref 5.7–8.2)
SODIUM SERPL-SCNC: 135 MMOL/L (ref 136–145)

## 2025-04-02 PROCEDURE — 82043 UR ALBUMIN QUANTITATIVE: CPT

## 2025-04-02 PROCEDURE — 36415 COLL VENOUS BLD VENIPUNCTURE: CPT

## 2025-04-02 PROCEDURE — 80053 COMPREHEN METABOLIC PANEL: CPT

## 2025-04-02 PROCEDURE — G2211 COMPLEX E/M VISIT ADD ON: HCPCS

## 2025-04-02 PROCEDURE — 83036 HEMOGLOBIN GLYCOSYLATED A1C: CPT

## 2025-04-02 PROCEDURE — 99214 OFFICE O/P EST MOD 30 MIN: CPT

## 2025-04-02 PROCEDURE — 82570 ASSAY OF URINE CREATININE: CPT

## 2025-04-02 NOTE — PATIENT INSTRUCTIONS
Start wearing compression stockings, keep the legs elevated, do the heel pumps, limit salt intake.

## 2025-04-03 DIAGNOSIS — E11.9 TYPE 2 DIABETES MELLITUS WITHOUT COMPLICATION, WITHOUT LONG-TERM CURRENT USE OF INSULIN (HCC): Primary | ICD-10-CM

## 2025-04-03 RX ORDER — GLIMEPIRIDE 2 MG/1
2 TABLET ORAL 2 TIMES DAILY
Qty: 180 TABLET | Refills: 1 | Status: SHIPPED | OUTPATIENT
Start: 2025-04-03

## 2025-05-06 ENCOUNTER — OFFICE VISIT (OUTPATIENT)
Dept: PODIATRY CLINIC | Facility: CLINIC | Age: 75
End: 2025-05-06

## 2025-05-06 DIAGNOSIS — I73.9 PERIPHERAL ARTERIAL DISEASE: ICD-10-CM

## 2025-05-06 DIAGNOSIS — M20.41 HAMMER TOES OF BOTH FEET: ICD-10-CM

## 2025-05-06 DIAGNOSIS — B35.1 ONYCHOMYCOSIS: ICD-10-CM

## 2025-05-06 DIAGNOSIS — E11.9 TYPE 2 DIABETES MELLITUS WITHOUT COMPLICATION, WITHOUT LONG-TERM CURRENT USE OF INSULIN (HCC): Primary | ICD-10-CM

## 2025-05-06 DIAGNOSIS — M20.42 HAMMER TOES OF BOTH FEET: ICD-10-CM

## 2025-05-06 PROCEDURE — 99213 OFFICE O/P EST LOW 20 MIN: CPT | Performed by: STUDENT IN AN ORGANIZED HEALTH CARE EDUCATION/TRAINING PROGRAM

## 2025-05-06 NOTE — PROGRESS NOTES
[facility] Podiatry  Progress Note    Silvio Arellano is a 75 year old male.   Chief Complaint   Patient presents with    Diabetic Foot Care     Last A1C 4/2/25 8.4- LOV 4/8/25 with Lola Freitas APRN, CNP- declines pain         HPI:     Patient is a pleasant 75-year-old diabetic male who presents to clinic for diabetic foot exam.  He has elongated and thickened nails he has difficulty trimming on his own.  He denies any open sores or other concerns.  His blood sugars were not checked this morning.  His last hemoglobin A1c was 8.4% on 4/8/2025.  No other complaints are mentioned.  Past medical history, medications, and allergies reviewed.      Allergies: Radiology contrast iodinated dyes, Iodine i 131 tositumomab, Other, and Iodine (topical)   Current Outpatient Medications   Medication Sig Dispense Refill    glimepiride 2 MG Oral Tab Take 1 tablet (2 mg total) by mouth 2 (two) times daily. 180 tablet 1    TAMSULOSIN 0.4 MG Oral Cap TAKE 1 CAPSULE BY MOUTH NIGHTLY AT BEDTIME 30 capsule 0    glimepiride 2 MG Oral Tab Take 1 tablet (2 mg total) by mouth before breakfast. 90 tablet 0    escitalopram 20 MG Oral Tab Take 1 tablet (20 mg total) by mouth daily. 90 tablet 0    metFORMIN  MG Oral Tablet 24 Hr Take 2 tablets (1,500 mg total) by mouth daily. 180 tablet 0    lisinopril 10 MG Oral Tab Take 1 tablet (10 mg total) by mouth every morning. 90 tablet 0    rosuvastatin (CRESTOR) 20 MG Oral Tab Take 1 tablet (20 mg total) by mouth daily.      Abiraterone Acetate 250 MG Oral Tab Take 1,000 mg by mouth before breakfast.      PATIENT SUPPLIED MEDICATION Abiraterone acetate 250mg tab = take four tabs daily on empty stomach -one hr before foods or two hrs [facility]      carbidopa-levodopa  MG Oral Tab Take 1 tablet by mouth 3 (three) times daily before meals.      diphenhydrAMINE HCl (BENADRYL ALLERGY) 25 MG Oral Tab Take 50mg one hour prior to contrast administration 2 tablet 0     Mirabegron ER 25 MG Oral Tablet 24 Hr 1 tablet (25 mg total) daily.      ergocalciferol 1.25 MG (85340 UT) Oral Cap Take by mouth daily.      Glucose Blood (ACCU-CHEK GUIDE) In Vitro Strip 1 each by Finger stick route daily. May dispense any glucometer, strips and lancets that insurance will cover DX: E11.9. Test once daily 100 strip 1    aspirin 81 MG Oral Tab EC Take 1 tablet (81 mg total) by mouth daily. 30 tablet 0    prednisoLONE 5 MG Oral Tab Take 1 tablet (5 mg total) by mouth daily. (Patient not taking: Reported on 5/6/2025)        Past Medical History:    Anesthesia complication    slow to awaken    Brain mass    Cataract    Depression    Diabetes (HCC)    Essential hypertension    Exposure to radiation    24 radiation of prostate prior to seed implantation    Hearing impaired person, bilateral    High blood pressure    High cholesterol    Lung mass    Obesity    Obstructive sleep apnea (adult) (pediatric)    COURT (obstructive sleep apnea)    AHI 76 RDI 85 REM AHI 51 Supine AHI 77 non-supine AHI 60 Sao2 Gregg 78%     COURT on CPAP    6-20 [facility]    Prostate cancer (HCC)    s/p SEEDS 5/2/13    Sleep apnea    Status post implantation of artificial urinary sphincter    Stroke (HCC)    Tobacco dependence    Visual impairment      Past Surgical History:   Procedure Laterality Date    Colonoscopy  1/9/13= Diverticulosis, Hemorrhoids    Repeat 2023    Other      anterior cervical neck sx  - metal    Other surgical history  2/20/13    PNBx - Dr. Berry    Other surgical history  5/2/13    Seeds Implant - [facility]    Remv cataract extracap,insert lens  12/26/2012    Procedure: LEFT PHACOEMULSIFICATION OF CATARACT WITH INTRAOCULAR LENS IMPLANT [id number];  Surgeon: Nitin Ang MD;  Location: [facility]    Rem cataract extracap,insert lens  11/28/2012    Procedure: RIGHT PHACOEMULSIFICATION OF CATARACT WITH INTRAOCULAR LENS IMPLANT [id number];  Surgeon: Nitin Ang MD;  Location: [facility]  [facility], [facility]    Tonsillectomy        Family History   Problem Relation Age of Onset    Cancer Sister         breast cancer    Cancer Sister     Cancer Sister     Cancer Sister       Social History     Socioeconomic History    Marital status:    Tobacco Use    Smoking status: Former     Current packs/day: 0.00     Average packs/day: 0.5 packs/day for 30.0 years (15.0 ttl pk-yrs)     Types: Cigarettes     Start date: 1984     Quit date: 2014     Years since quittin.0    Smokeless tobacco: Never   Vaping Use    Vaping status: Never Used   Substance and Sexual Activity    Alcohol use: Not Currently     Alcohol/week: 2.0 standard drinks of alcohol     Types: 2 Cans of beer per week    Drug use: No   Other Topics Concern    Caffeine Concern Yes     Comment: coffee 1-2 daily    Exercise No           REVIEW OF SYSTEMS:     No n/v/f/c.      EXAM:   There were no vitals taken for this visit.  GENERAL: well developed, well nourished, in no apparent distress  EXTREMITIES:   1. Integument: Normal skin temperature and turgor.  Nails x10 are elongated and thickened and with subungual debris.  2. Vascular: Pedal pulses are lightly palpable.   3. Musculoskeletal: All muscle groups are graded 5 out of 5 in the foot and ankle.  Flexion contracture of lesser digits.   4. Neurological: Normal sharp dull sensation; reflexes normal.          ASSESSMENT AND PLAN:   There are no diagnoses linked to this encounter.        Plan:     -Patient examined, chart history reviewed.  -Discussed importance of proper pedal hygiene, regular foot checks, and tight glucose control.  -Sharply debrided nails x10 with a sterile nail nipper achieving a 20% reduction in thickness and length, without incident. Nails further smoothed with dremel.  -Will check arterial duplex to ensure adequate blood flow to lower extremities.  -Ambulate with supportive shoes and inserts and avoid walking barefoot.   -Educated patient on acute signs of  infection advised patient to seek immediate medical attention if symptoms arise.         The patient indicates understanding of these issues and agrees to the plan.    RTC 3 months.    Gian Grant DPM

## 2025-05-19 DIAGNOSIS — I25.10 CORONARY ARTERY CALCIFICATION SEEN ON CT SCAN: ICD-10-CM

## 2025-05-19 DIAGNOSIS — E11.59 HYPERTENSION ASSOCIATED WITH DIABETES (HCC): ICD-10-CM

## 2025-05-19 DIAGNOSIS — E11.9 TYPE 2 DIABETES MELLITUS WITHOUT COMPLICATION, WITHOUT LONG-TERM CURRENT USE OF INSULIN (HCC): ICD-10-CM

## 2025-05-19 DIAGNOSIS — I15.2 HYPERTENSION ASSOCIATED WITH DIABETES (HCC): ICD-10-CM

## 2025-05-19 DIAGNOSIS — Z86.73 HISTORY OF STROKE: ICD-10-CM

## 2025-05-19 DIAGNOSIS — F34.1 DYSTHYMIA: ICD-10-CM

## 2025-05-19 RX ORDER — LISINOPRIL 10 MG/1
10 TABLET ORAL EVERY MORNING
Qty: 90 TABLET | Refills: 0 | Status: SHIPPED | OUTPATIENT
Start: 2025-05-19

## 2025-05-19 RX ORDER — ROSUVASTATIN CALCIUM 20 MG/1
20 TABLET, COATED ORAL DAILY
Qty: 90 TABLET | Refills: 0 | Status: SHIPPED | OUTPATIENT
Start: 2025-05-19

## 2025-05-19 RX ORDER — ESCITALOPRAM OXALATE 20 MG/1
20 TABLET ORAL DAILY
Qty: 90 TABLET | Refills: 0 | Status: SHIPPED | OUTPATIENT
Start: 2025-05-19

## 2025-05-19 RX ORDER — GLIMEPIRIDE 2 MG/1
2 TABLET ORAL
Qty: 90 TABLET | Refills: 0 | Status: SHIPPED | OUTPATIENT
Start: 2025-05-19

## 2025-05-19 NOTE — TELEPHONE ENCOUNTER
Lexapro  Last time medication was refilled 1/30/25  Last office visit  4/2/25  Next office visit due/scheduled No future appts  Medication not on protocol.        Glimepiride  Last time medication was refilled 1/30/25  Last office visit  4/2/25  Next office visit due/scheduled No future appts  Medication failed protocol

## 2025-05-19 NOTE — TELEPHONE ENCOUNTER
Lisinopril  Last time medication was refilled 1/13/25  Last office visit  4/2/25  Next office visit due/scheduled   No future appts  Passed protocol, Medication sent.        Rosuvastatin  Last time medication was refilled 1/10/25  Last office visit  4/2/25  Next office visit due/scheduled No future appts  Passed protocol, Medication sent.

## 2025-05-25 DIAGNOSIS — E11.9 TYPE 2 DIABETES MELLITUS WITHOUT COMPLICATION, WITHOUT LONG-TERM CURRENT USE OF INSULIN (HCC): ICD-10-CM

## 2025-05-25 RX ORDER — METFORMIN HYDROCHLORIDE 750 MG/1
1500 TABLET, EXTENDED RELEASE ORAL DAILY
Qty: 180 TABLET | Refills: 0 | Status: SHIPPED | OUTPATIENT
Start: 2025-05-25

## 2025-05-25 NOTE — TELEPHONE ENCOUNTER
Last time medication was refilled: 1/30/25  Next office visit due/scheduled: no apt  Last office visit: 4/2/25  Last Labs: 4/2/25

## 2025-05-30 ENCOUNTER — HOSPITAL ENCOUNTER (OUTPATIENT)
Dept: ULTRASOUND IMAGING | Facility: HOSPITAL | Age: 75
Discharge: HOME | End: 2025-05-30
Attending: STUDENT IN AN ORGANIZED HEALTH CARE EDUCATION/TRAINING PROGRAM
Payer: MEDICARE

## 2025-05-30 DIAGNOSIS — I73.9 PERIPHERAL ARTERIAL DISEASE: ICD-10-CM

## 2025-05-30 PROCEDURE — 93925 LOWER EXTREMITY STUDY: CPT | Performed by: STUDENT IN AN ORGANIZED HEALTH CARE EDUCATION/TRAINING PROGRAM

## 2025-06-03 DIAGNOSIS — I73.9 PERIPHERAL VASCULAR DISEASE: Primary | ICD-10-CM

## 2025-06-05 ENCOUNTER — TELEPHONE (OUTPATIENT)
Dept: INTERNAL MEDICINE CLINIC | Facility: CLINIC | Age: 75
End: 2025-06-05

## 2025-06-05 NOTE — TELEPHONE ENCOUNTER
Patient's spouse called stating that he will be staying at [facility] from 6/15-6/17. She states that they are requesting all records from pcp office dating from the last 30 days.     Fax is [phone]. With attention to [facility].     Please advise.

## 2025-06-05 NOTE — TELEPHONE ENCOUNTER
No appointment or testing during last month  Abstract report printed and faxed to provided number

## 2025-06-13 NOTE — TELEPHONE ENCOUNTER
Patient's spouse came in and requested updated medication list. Writer printed off med list per request.

## 2025-06-18 ENCOUNTER — TELEPHONE (OUTPATIENT)
Facility: CLINIC | Age: 75
End: 2025-06-18

## 2025-07-09 ENCOUNTER — OFFICE VISIT (OUTPATIENT)
Dept: INTERNAL MEDICINE CLINIC | Facility: CLINIC | Age: 75
End: 2025-07-09
Payer: MEDICARE

## 2025-07-09 ENCOUNTER — LAB ENCOUNTER (OUTPATIENT)
Dept: LAB | Age: 75
End: 2025-07-09
Attending: NURSE PRACTITIONER
Payer: MEDICARE

## 2025-07-09 VITALS
BODY MASS INDEX: 33.18 KG/M2 | HEART RATE: 86 BPM | OXYGEN SATURATION: 98 % | SYSTOLIC BLOOD PRESSURE: 120 MMHG | RESPIRATION RATE: 18 BRPM | WEIGHT: 245 LBS | HEIGHT: 72 IN | DIASTOLIC BLOOD PRESSURE: 68 MMHG | TEMPERATURE: 98 F

## 2025-07-09 DIAGNOSIS — E11.8 DIABETES MELLITUS WITH COMPLICATION (HCC): Primary | ICD-10-CM

## 2025-07-09 DIAGNOSIS — E87.5 HYPERKALEMIA: ICD-10-CM

## 2025-07-09 DIAGNOSIS — I15.2 HYPERTENSION ASSOCIATED WITH DIABETES (HCC): ICD-10-CM

## 2025-07-09 DIAGNOSIS — E11.8 DIABETES MELLITUS WITH COMPLICATION (HCC): ICD-10-CM

## 2025-07-09 DIAGNOSIS — E11.59 HYPERTENSION ASSOCIATED WITH DIABETES (HCC): ICD-10-CM

## 2025-07-09 PROBLEM — R41.0 CONFUSION: Status: RESOLVED | Noted: 2025-01-01 | Resolved: 2025-07-09

## 2025-07-09 LAB
EST. AVERAGE GLUCOSE BLD GHB EST-MCNC: 180 MG/DL (ref 68–126)
HBA1C MFR BLD: 7.9 % (ref ?–5.7)
POTASSIUM SERPL-SCNC: 5.1 MMOL/L (ref 3.5–5.1)

## 2025-07-09 PROCEDURE — 36415 COLL VENOUS BLD VENIPUNCTURE: CPT

## 2025-07-09 PROCEDURE — 99214 OFFICE O/P EST MOD 30 MIN: CPT | Performed by: NURSE PRACTITIONER

## 2025-07-09 PROCEDURE — 84132 ASSAY OF SERUM POTASSIUM: CPT

## 2025-07-09 PROCEDURE — 83036 HEMOGLOBIN GLYCOSYLATED A1C: CPT

## 2025-07-09 PROCEDURE — G2211 COMPLEX E/M VISIT ADD ON: HCPCS | Performed by: NURSE PRACTITIONER

## 2025-07-09 RX ORDER — TIRZEPATIDE 2.5 MG/.5ML
2.5 INJECTION, SOLUTION SUBCUTANEOUS WEEKLY
Qty: 0.5 ML | Refills: 0 | Status: SHIPPED | OUTPATIENT
Start: 2025-07-09

## 2025-07-09 NOTE — PROGRESS NOTES
HPI:    Patient ID: Silvio Arellano is a 75 year old male.    Chief Complaint   Patient presents with    Blood Sugar       Blood Sugar      History of Present Illness  Manjinder Arellano is a 75 year old male with diabetes who presents for blood sugar management and dental issues. He is accompanied by Denise, his wife    He has been without teeth for the past three months, limiting his diet to milkshakes and other soft foods. He is scheduled for an oral surgery appointment next week and [facility]    His fasting blood sugar level is 259 mg/dL. He is not on insulin and is considering alternative medications for blood sugar control. He is interested in continuous glucose monitoring but currently uses finger-stick methods.  He is taking metformin 750 mg daily, and glimepiride 2 mg bid         Review of Systems   Constitutional: Negative.    Respiratory: Negative.     Cardiovascular: Negative.    Gastrointestinal: Negative.    Endocrine: Negative.  Negative for polydipsia, polyphagia and polyuria.   Genitourinary: Negative.    Neurological: Negative.          Past Medical History[1]  Past Surgical History[2]  Family History[3]  Social Hx on file[4]     Current Medications[5]  Allergies:Allergies[6]    Lab Results   Component Value Date     (H) 04/02/2025    BUN 22 04/02/2025    BUNCREA 23.0 (H) 10/12/2020    CREATSERUM 1.10 04/02/2025    ANIONGAP 5 04/02/2025    GFR 88 05/11/2017    GFRNAA 90 06/12/2024    GFRAA 76 05/07/2022    CA 9.4 04/02/2025    OSMOCALC 288 04/02/2025    ALKPHO 60 04/02/2025    AST 22 04/02/2025    ALT 29 04/02/2025    BILT 0.4 04/02/2025    TP 6.9 04/02/2025    ALB 4.4 04/02/2025    GLOBULIN 2.5 04/02/2025    AGRATIO 2.0 08/13/2015     (L) 04/02/2025    K 4.2 04/02/2025     04/02/2025    CO2 24.0 04/02/2025     Lab Results   Component Value Date    WBC 5.6 01/02/2025    RBC 3.96 01/02/2025    HGB 11.9 (L) 01/02/2025    HCT 36.0 (L) 01/02/2025     MCV 90.9 01/02/2025    MCH 30.1 01/02/2025    MCHC 33.1 01/02/2025    RDW 13.5 01/02/2025    .0 (L) 01/02/2025    MPV 12.3 (H) 09/03/2012     Lab Results   Component Value Date    CHOLEST 96 06/24/2024    TRIG 230 (H) 06/24/2024    HDL 42 06/24/2024    LDL 19 06/24/2024    VLDL 29 06/24/2024    NONHDLC 54 06/24/2024     Lab Results   Component Value Date     (H) 04/02/2025    A1C 8.4 (H) 04/02/2025     Lab Results   Component Value Date    T4F 1.0 10/20/2022    TSH 4.300 (H) 10/20/2022     Lab Results   Component Value Date    VITD 60.5 10/26/2023     No results found for: \"SAMREEN\"  No results found for: \"FOLIC\", \"FOLATESER\", \"FOLATE\"  No results found for: \"IRON\", \"IRONTOT\"  Lab Results   Component Value Date    VITB12 535 08/13/2015     Lab Results   Component Value Date    PHOS 3.3 05/11/2017     Lab Results   Component Value Date    MG 2.4 05/11/2017        PHYSICAL EXAM:   /68   Pulse 86   Temp 98 °F (36.7 °C) (Temporal)   Resp 18   Ht 6' (1.829 m)   Wt 245 lb (111.1 kg)   SpO2 98%   BMI 33.23 kg/m²   Physical Exam  Vitals and nursing note reviewed. Exam conducted with a chaperone present.   Constitutional:       Appearance: Normal appearance.   Cardiovascular:      Rate and Rhythm: Normal rate and regular rhythm.      Pulses: Normal pulses.      Heart sounds: Normal heart sounds. No murmur heard.  Pulmonary:      Effort: Pulmonary effort is normal. No respiratory distress.      Breath sounds: Normal breath sounds.   Abdominal:      General: Bowel sounds are normal.      Palpations: Abdomen is soft.   Musculoskeletal:      Right lower leg: Edema present.      Left lower leg: Edema present.      Comments: Non-pitting     Skin:     General: Skin is warm.      Capillary Refill: Capillary refill takes more than 3 seconds.   Neurological:      Mental Status: He is alert and oriented to person, place, and time.   Psychiatric:         Mood and Affect: Mood normal.               ASSESSMENT/PLAN:   Diagnoses and all orders for this visit:    Diabetes mellitus with complication (HCC)- continue oral agents, add GLP-1, follow up in 1 month, discussed s/e  -     Hemoglobin A1C [E]; Future  -     Tirzepatide (MOUNJARO) 2.5 MG/0.5ML Subcutaneous Solution Auto-injector; Inject 2.5 mg into the skin once a week.    Hyperkalemia- recheck levels today  -     Potassium [E]; Future    Hypertension associated with diabetes (HCC)- controlled, CPM        KIM Fried            [1]   Past Medical History:   Anesthesia complication    slow to awaken    Brain mass    Cataract    Depression    Diabetes (HCC)    Essential hypertension    Exposure to radiation    24 radiation of prostate prior to seed implantation    Hearing impaired person, bilateral    High blood pressure    High cholesterol    Lung mass    Obesity    Obstructive sleep apnea (adult) (pediatric)    COURT (obstructive sleep apnea)    AHI 76 RDI 85 REM AHI 51 Supine AHI 77 non-supine AHI 60 Sao2 Gregg 78%     COURT on CPAP    6-20 [facility]    Prostate cancer (HCC)    s/p SEEDS 5/2/13    Sleep apnea    Status post implantation of artificial urinary sphincter    Stroke ([facility])    Tobacco dependence    Visual impairment   [2]   Past Surgical History:  Procedure Laterality Date    Colonoscopy  1/9/13= Diverticulosis, Hemorrhoids    Repeat 2023    Other      anterior cervical neck sx  - metal    Other surgical history  2/20/13    PNBx - Dr. Berry    Other surgical history  5/2/13    Seeds Implant - [facility]    Remv cataract extracap,insert lens  12/26/2012    Procedure: LEFT PHACOEMULSIFICATION OF CATARACT WITH INTRAOCULAR LENS IMPLANT [id number];  Surgeon: Nitin Ang MD;  Location: [facility]    Remv cataract extracap,insert lens  11/28/2012    Procedure: RIGHT PHACOEMULSIFICATION OF CATARACT WITH INTRAOCULAR LENS IMPLANT [id number];  Surgeon: Nitin Ang MD;  Location: [facility]    Tonsillectomy      [3]   Family History  Problem Relation Age of Onset    Cancer Sister         breast cancer    Cancer Sister     Cancer Sister     Cancer Sister    [4]   Social History  Socioeconomic History    Marital status:    Tobacco Use    Smoking status: Former     Current packs/day: 0.00     Average packs/day: 0.5 packs/day for 30.0 years (15.0 ttl pk-yrs)     Types: Cigarettes     Start date: 1984     Quit date: 2014     Years since quittin.1    Smokeless tobacco: Never   Vaping Use    Vaping status: Never Used   Substance and Sexual Activity    Alcohol use: Not Currently     Alcohol/week: 2.0 standard drinks of alcohol     Types: 2 Cans of beer per week    Drug use: No   Other Topics Concern    Caffeine Concern Yes     Comment: coffee 1-2 daily    Exercise No   [5]   Current Outpatient Medications   Medication Sig Dispense Refill    Tirzepatide (MOUNJARO) 2.5 MG/0.5ML Subcutaneous Solution Auto-injector Inject 2.5 mg into the skin once a week. 0.5 mL 0    METFORMIN  MG Oral Tablet 24 Hr Take 2 tablets by mouth once daily 180 tablet 0    LISINOPRIL 10 MG Oral Tab TAKE 1 TABLET BY MOUTH ONCE DAILY IN THE MORNING 90 tablet 0    ESCITALOPRAM 20 MG Oral Tab Take 1 tablet by mouth once daily 90 tablet 0    ROSUVASTATIN 20 MG Oral Tab Take 1 tablet by mouth once daily 90 tablet 0    glimepiride 2 MG Oral Tab Take 1 tablet (2 mg total) by mouth 2 (two) times daily. 180 tablet 1    PATIENT SUPPLIED MEDICATION Abiraterone acetate 250mg tab = take four tabs daily on empty stomach -one hr before foods or two hrs [facility]      carbidopa-levodopa  MG Oral Tab Take 1 tablet by mouth 3 (three) times daily before meals.      Mirabegron ER 25 MG Oral Tablet 24 Hr 1 tablet (25 mg total) daily.      ergocalciferol 1.25 MG (09124 UT) Oral Cap Take by mouth daily.      Glucose Blood (ACCU-CHEK GUIDE) In Vitro Strip 1 each by Finger stick route daily. May dispense any glucometer,  strips and lancets that insurance will cover DX: E11.9. Test once daily 100 strip 1    aspirin 81 MG Oral Tab EC Take 1 tablet (81 mg total) by mouth daily. 30 tablet 0    TAMSULOSIN 0.4 MG Oral Cap TAKE 1 CAPSULE BY MOUTH NIGHTLY AT BEDTIME (Patient not taking: Reported on 7/9/2025) 30 capsule 0    Abiraterone Acetate 250 MG Oral Tab Take 1,000 mg by mouth before breakfast. (Patient not taking: Reported on 7/9/2025)      prednisoLONE 5 MG Oral Tab Take 1 tablet (5 mg total) by mouth daily. (Patient not taking: Reported on 7/9/2025)      diphenhydrAMINE HCl (BENADRYL ALLERGY) 25 MG Oral Tab Take 50mg one hour prior to contrast administration (Patient not taking: Reported on 7/9/2025) 2 tablet 0   [6]   Allergies  Allergen Reactions    Radiology Contrast Iodinated Dyes HIVES and RASH    Iodine I 131 Tositumomab RASH     MRI contrast or IV dye     Other RASH     Iodine Dye Injectable    Iodine (Topical) RASH     Cerner Allergy Text Annotation: iodine; pt allergic to CT Contrast. Not topic betadine solution  Iodine Dye Injectable  Iodine Dye Injectable

## 2025-07-09 NOTE — PROGRESS NOTES
The following individual(s) verbally consented to be recorded using ambient AI listening technology and understand that they can each withdraw their consent to this listening technology at any point by asking the clinician to turn off or pause the recording:    Patient name: Silvio Arellano  Additional names:  Denise (spouse)

## 2025-07-28 ENCOUNTER — TELEPHONE (OUTPATIENT)
Dept: INTERNAL MEDICINE CLINIC | Facility: CLINIC | Age: 75
End: 2025-07-28

## 2025-07-28 NOTE — TELEPHONE ENCOUNTER
Pt spouse would like to schedule follow up with Dr. Genao regarding blood sugar and Mounjaro. Appt scheduled for Friday with Dr. Genao.    FYI from spouse as well, pt scheduled for CTA tomorrow with vascular.

## 2025-07-28 NOTE — TELEPHONE ENCOUNTER
Patient's wife called asking to speak with a nurse about her . He cancelled the appointment for today with Dr. Genao and now wants to know if they have to schedule another one for possible surgery. Please advise.

## 2025-07-29 ENCOUNTER — HOSPITAL ENCOUNTER (OUTPATIENT)
Dept: CT IMAGING | Facility: HOSPITAL | Age: 75
Discharge: HOME | End: 2025-07-29
Attending: SURGERY

## 2025-07-29 DIAGNOSIS — I72.3 ANEURYSM OF ILIAC ARTERY: ICD-10-CM

## 2025-07-29 DIAGNOSIS — I70.213 ATHEROSCLEROSIS OF NATIVE ARTERIES OF EXTREMITIES WITH INTERMITTENT CLAUDICATION, BILATERAL LEGS: ICD-10-CM

## 2025-07-29 LAB
CREAT BLD-MCNC: 0.8 MG/DL (ref 0.7–1.3)
EGFRCR SERPLBLD CKD-EPI 2021: 92 ML/MIN/1.73M2 (ref 60–?)

## 2025-07-29 PROCEDURE — 82565 ASSAY OF CREATININE: CPT

## 2025-07-29 PROCEDURE — 75635 CT ANGIO ABDOMINAL ARTERIES: CPT | Performed by: SURGERY

## 2025-08-01 ENCOUNTER — OFFICE VISIT (OUTPATIENT)
Dept: INTERNAL MEDICINE CLINIC | Facility: CLINIC | Age: 75
End: 2025-08-01

## 2025-08-01 VITALS
HEIGHT: 72 IN | WEIGHT: 245 LBS | TEMPERATURE: 98 F | DIASTOLIC BLOOD PRESSURE: 74 MMHG | BODY MASS INDEX: 33.18 KG/M2 | HEART RATE: 80 BPM | OXYGEN SATURATION: 99 % | RESPIRATION RATE: 18 BRPM | SYSTOLIC BLOOD PRESSURE: 128 MMHG

## 2025-08-01 DIAGNOSIS — I15.2 HYPERTENSION ASSOCIATED WITH DIABETES (HCC): ICD-10-CM

## 2025-08-01 DIAGNOSIS — E11.8 DIABETES MELLITUS WITH COMPLICATION (HCC): Primary | ICD-10-CM

## 2025-08-01 DIAGNOSIS — E11.59 HYPERTENSION ASSOCIATED WITH DIABETES (HCC): ICD-10-CM

## 2025-08-01 PROCEDURE — G2211 COMPLEX E/M VISIT ADD ON: HCPCS | Performed by: INTERNAL MEDICINE

## 2025-08-01 PROCEDURE — 99214 OFFICE O/P EST MOD 30 MIN: CPT | Performed by: INTERNAL MEDICINE

## 2025-08-01 RX ORDER — PREDNISONE 50 MG/1
TABLET ORAL
COMMUNITY
Start: 2025-07-28

## 2025-08-09 DIAGNOSIS — E11.9 TYPE 2 DIABETES MELLITUS WITHOUT COMPLICATION, WITHOUT LONG-TERM CURRENT USE OF INSULIN (HCC): ICD-10-CM

## 2025-08-09 DIAGNOSIS — E11.59 HYPERTENSION ASSOCIATED WITH DIABETES (HCC): ICD-10-CM

## 2025-08-09 DIAGNOSIS — Z86.73 HISTORY OF STROKE: ICD-10-CM

## 2025-08-09 DIAGNOSIS — I15.2 HYPERTENSION ASSOCIATED WITH DIABETES (HCC): ICD-10-CM

## 2025-08-09 RX ORDER — GLIMEPIRIDE 2 MG/1
2 TABLET ORAL
Qty: 90 TABLET | Refills: 0 | Status: SHIPPED | OUTPATIENT
Start: 2025-08-09

## 2025-08-09 RX ORDER — LISINOPRIL 10 MG/1
10 TABLET ORAL EVERY MORNING
Qty: 90 TABLET | Refills: 0 | Status: SHIPPED | OUTPATIENT
Start: 2025-08-09

## (undated) DEVICE — TRANSPOSAL ULTRAFLEX DUO/QUAD ULTRA CART MANIFOLD

## (undated) DEVICE — TRAY LUMBAR PUNCTURE SAFETY

## (undated) DEVICE — 3M™ TEGADERM™ TRANSPARENT FILM DRESSING, 1626W, 4 IN X 4-3/4 IN (10 CM X 12 CM), 50 EACH/CARTON, 4 CARTON/CASE: Brand: 3M™ TEGADERM™

## (undated) DEVICE — HERMETIC™ II HERMETIC™ LUMBAR DRAINAGE ACCESSORY KIT: Brand: HERMETIC™ II

## (undated) DEVICE — 3M(TM) TEGADERM(TM) TRANSPARENT FILM DRESSING FRAME STYLE 1628: Brand: 3M™ TEGADERM™

## (undated) DEVICE — GLOVE BIOGEL M SURG SZ 71/2

## (undated) DEVICE — GOWN,SIRUS,FABRIC-REINFORCED,LARGE: Brand: MEDLINE

## (undated) DEVICE — GLOVE SURG TRIUMPH SZ 8

## (undated) DEVICE — REM POLYHESIVE ADULT PATIENT RETURN ELECTRODE: Brand: VALLEYLAB

## (undated) NOTE — MR AVS SNAPSHOT
[address], [address] [place] [address] [phone]               Thank you for choosing us for your health care visit with Sarita Arce MD.  We are glad to serve you and happy to provide you with this of individual in advance and they must present an ID as well. ? The name of the person picking up your prescription must be documented in your chart.   Scheduling Tests    If your physician has ordered radiology tests such as MRI or CT scans, do not schedu Follow Up with Our Office     Return in 2 weeks (on 2/23/2017).       Your Appointments     Feb 23, 2017 10:00 AM   Follow up with Francine Najjar, MD   [address] [facility] ([address] [facility]    [address] [facility] [address],

## (undated) NOTE — LETTER
[facility]  Felipa Hungiliana 61 [address] [employer], [address] [place] [address]    Consent for Operation    Date: __________________    Time: _______________    1.  I authorize the performance upon Magno Moore the following operation:    Procedure(s):  INSERTION LUMBAR DRAIN procedure has been videotaped, the surgeon will obtain the original videotape. The hospital will not be responsible for storage or maintenance of this tape.     6. For the purpose of advancing medical education, I consent to the admittance of observers to t STATEMENTS REQUIRING INSERTION OR COMPLETION WERE FILLED IN.     Signature of Patient:   ___________________________    When the patient is a minor or mentally incompetent to give consent:  Signature of person authorized to consent for patient: ____________ drugs/illegal medications). Failure to inform my anesthesiologist about these medicines may increase my risk of anesthetic complications. · If I am allergic to anything or have had a reaction to anesthesia before.     3. I understand how the anesthesia med I have discussed the procedure and information above with the patient (or patient’s representative) and answered their questions. The patient or their representative has agreed to have anesthesia services.     _______________________________________________

## (undated) NOTE — LETTER
[facility]  Felipa Boucher 61 [address] [facility], [address] [place] [address]    Consent for Operation    Date: __________________    Time: _______________    1.  I authorize the performance upon Kain Bass the following operation:    Procedure(s):  INSERTION LUMBAR DRAIN procedure has been videotaped, the surgeon will obtain the original videotape. The hospital will not be responsible for storage or maintenance of this tape.     6. For the purpose of advancing medical education, I consent to the admittance of observers to t STATEMENTS REQUIRING INSERTION OR COMPLETION WERE FILLED IN.     Signature of Patient:   ___________________________    When the patient is a minor or mentally incompetent to give consent:  Signature of person authorized to consent for patient: ____________ drugs/illegal medications). Failure to inform my anesthesiologist about these medicines may increase my risk of anesthetic complications. · If I am allergic to anything or have had a reaction to anesthesia before.     3. I understand how the anesthesia med I have discussed the procedure and information above with the patient (or patient’s representative) and answered their questions. The patient or their representative has agreed to have anesthesia services.     _______________________________________________

## (undated) NOTE — MR AVS SNAPSHOT
[facility], [place] [address] [facility], [address] [place] [address] [phone]               Thank you for choosing us for your health care visit with Maxime Lopez MD.  We are glad to serve you and happy to provide you with this [address] [facility] ([facility], [facility])    [address] [facility], [facility] [address] [facility] [address] [phone]              Today's Orders     MRI SPINE LUMBAR (GEX=[id number]) [[id number]]    Complete by:  Feb 23, 2017 (A ? To best provide you care, patients receiving routine medications need to be seen at least once a year.  protocol for controlled substances:  Written prescriptions    ? Written prescriptions must be picked up in office. ?  Please allow the offic We will arrange a lumbar drain trial based on outcome of heart and lumbar studies.          Allergies as of Feb 23, 2017     Iodine I 131 Tositumomab Rash    MRI contrast or IV dye     Other Rash    Iodine Dye Injectable                Today's Vital Signs

## (undated) NOTE — MR AVS SNAPSHOT
[address], [address] [phone]               Thank you for choosing us for your health care visit with Praveena Bella MD.  We are glad to serve you and happy to provide you with this ? Refills are not addressed on weekends; covering physicians do not authorize routine medications on weekends. ? No narcotics or controlled substances are refilled after noon on Fridays or by on call physicians.   ? By law, narcotics cannot be faxed or ujma the procedure/test has been pre-certified. You are strongly encouraged to contact your insurance carrier to verify that your procedure/test has been approved and is a COVERED benefit.   Although the [facility] staff does its due diligence, the insurance carrier g Visit [facility] online at  [email or web address]

## (undated) NOTE — MR AVS SNAPSHOT
[address], [address] [place] [address] [phone]               Thank you for choosing us for your health care visit with Nanci Rowley MD.  We are glad to serve you and happy to provide you with this prescription must be signed by the provider, picked up in our office and physically brought to the pharmacy. A 30 day supply with no refills is the maximum allowed. ? If your prescription is due for a refill, you may be due for a follow up appointment. understanding in the matter. PLAN:  1. Follow 2 weeks after lumbar drain trial  2. Medication: none  3. Limit the patient next Tuesday for lumbar drain trial.  4.  Further recommendations be forthcoming based upon his trial.  If it is negative.   Recomm Visit [facility] online at  [email or web address]

## (undated) NOTE — IP AVS SNAPSHOT
[facility] [place]  [address] [place], [address] ~ [phone]                Discharge Summary   5/9/2017    Yulissa Hunter           Admission Information        Provider Department    5/9/2017 Carlos Alberto Liu MD  6Chidi Rios Deter Patient Instructions       Please hold Aspirin for 24 hours. May resume tomorrow.     May shower tonight and remove dressing  Notify Dr. Suhas Germain office if headache persists for longer than 24 hours      Follow-up Information     Follow up wit Nursing Home / Special Facility:  No    :      Performing physician's office phone number:  [phone]    Performing physician's office fax number:  [phone]    Office phone number:      Office contact name/phone:      Primary Insurance N 7.85 (H) (05/10/17)  1.09 (05/10/17)  0.66 (H) (05/10/17)  0.06 (05/10/17)  1.10      Metabolic Lab Results  (Last result in the past 90 days)    HgbA1C Glucose BUN Creatinine Calcium Alkaline Phosph AST    -- (05/11/17)  107 (H) (05/11/17)  18 (05/11/17) Visits < Visit Summaries. MyChart questions? Call [phone] for help. MyChart is NOT to be used for urgent needs.   For medical emergencies, dial 911.             _____________________________________________________________________________    Henrique Larios What to report to your healthcare team: Dizziness, breast tissue enlargement, abnormal ejaculation             Mood and Thought Medications     Nicotine Polacrilex 4 MG Mouth/Throat Lozenge       Use: Treat conditions such as depression and thought disorde

## (undated) NOTE — LETTER
[address] [place] [id number]  [phone]          01/09/23      Chica Jeff  1/5/1950      Physical Therapy    Evaluate and Treat      Dx:   Lower extremity weakness bilateral, vascular parkinsonism           Lauren Marie MD

## (undated) NOTE — MR AVS SNAPSHOT
[address] 95 [phone] 16               Thank you for choosing us for your health care visit with Praveena Bella MD.  We are glad to serve you and happy to provide you with this ? Patient must present photo ID at time of . If a designated family member will be picking up prescription, office must be given name of individual in advance and they must present an ID as well. ?  The name of the person picking up your prescripti FOLLOW UP with Michelle Hammond MD   [facility] INTERNAL MEDICINE - HERMELINDO Knutson ([address])    [address] [phone]              Allergies as of Feb 14, 2017     Iodine I 131 Tositumomab Ra to obtain this authorization for your ordered radiology test.    To schedule an appointment for your radiology test please call Sahra Aceves Scheduling   at [phone].          [employer]     Visit [employer]  You can access your [employer] to more act

## (undated) NOTE — MR AVS SNAPSHOT
[facility], [address] [id number]               Thank you for choosing us for your health care visit with ANTON Byrd.   We are glad to serve you and happy to provide you with this ? EFFECTIVE April 1, 2017 PATIENTS MUST  THEIR OWN NARCOTIC PRESCRIPTIONS. ? Written prescriptions must be picked up in office. ? Please allow the office 48-72 hours to fill the prescription. ? Patient must present photo ID at time of . aspirin 81 MG Tbec   Take 1 tablet (81 mg total) by mouth daily. Butalbital-APAP-Caffeine -40 MG Tabs   Take 1 tablet by mouth every 6 (six) hours as needed for Pain.    What changed:  how much to take   Commonly known as:  Rohan Daniel